# Patient Record
Sex: MALE | Race: OTHER | NOT HISPANIC OR LATINO
[De-identification: names, ages, dates, MRNs, and addresses within clinical notes are randomized per-mention and may not be internally consistent; named-entity substitution may affect disease eponyms.]

---

## 2017-11-29 ENCOUNTER — APPOINTMENT (OUTPATIENT)
Dept: INTERNAL MEDICINE | Facility: CLINIC | Age: 52
End: 2017-11-29

## 2019-10-21 ENCOUNTER — INPATIENT (INPATIENT)
Facility: HOSPITAL | Age: 54
LOS: 10 days | Discharge: HOME | DRG: 21 | End: 2019-11-01
Attending: NEUROLOGICAL SURGERY | Admitting: NEUROLOGICAL SURGERY
Payer: COMMERCIAL

## 2019-10-21 VITALS
HEART RATE: 68 BPM | HEIGHT: 69 IN | TEMPERATURE: 99 F | SYSTOLIC BLOOD PRESSURE: 168 MMHG | WEIGHT: 169.98 LBS | RESPIRATION RATE: 19 BRPM | DIASTOLIC BLOOD PRESSURE: 90 MMHG | OXYGEN SATURATION: 98 %

## 2019-10-21 DIAGNOSIS — I60.9 NONTRAUMATIC SUBARACHNOID HEMORRHAGE, UNSPECIFIED: ICD-10-CM

## 2019-10-21 LAB
ALBUMIN SERPL ELPH-MCNC: 4.6 G/DL — SIGNIFICANT CHANGE UP (ref 3.3–5)
ALP SERPL-CCNC: 63 U/L — SIGNIFICANT CHANGE UP (ref 40–120)
ALT FLD-CCNC: 29 U/L — SIGNIFICANT CHANGE UP (ref 10–45)
ANION GAP SERPL CALC-SCNC: 12 MMOL/L — SIGNIFICANT CHANGE UP (ref 5–17)
ANION GAP SERPL CALC-SCNC: 13 MMOL/L — SIGNIFICANT CHANGE UP (ref 5–17)
APTT BLD: 21.4 SEC — LOW (ref 27.5–36.3)
AST SERPL-CCNC: 14 U/L — SIGNIFICANT CHANGE UP (ref 10–40)
BASOPHILS # BLD AUTO: 0.02 K/UL — SIGNIFICANT CHANGE UP (ref 0–0.2)
BASOPHILS NFR BLD AUTO: 0.2 % — SIGNIFICANT CHANGE UP (ref 0–2)
BILIRUB SERPL-MCNC: 0.8 MG/DL — SIGNIFICANT CHANGE UP (ref 0.2–1.2)
BLD GP AB SCN SERPL QL: NEGATIVE — SIGNIFICANT CHANGE UP
BUN SERPL-MCNC: 15 MG/DL — SIGNIFICANT CHANGE UP (ref 7–23)
BUN SERPL-MCNC: 17 MG/DL — SIGNIFICANT CHANGE UP (ref 7–23)
CALCIUM SERPL-MCNC: 8.6 MG/DL — SIGNIFICANT CHANGE UP (ref 8.4–10.5)
CALCIUM SERPL-MCNC: 9.5 MG/DL — SIGNIFICANT CHANGE UP (ref 8.4–10.5)
CHLORIDE SERPL-SCNC: 103 MMOL/L — SIGNIFICANT CHANGE UP (ref 96–108)
CHLORIDE SERPL-SCNC: 105 MMOL/L — SIGNIFICANT CHANGE UP (ref 96–108)
CO2 SERPL-SCNC: 22 MMOL/L — SIGNIFICANT CHANGE UP (ref 22–31)
CO2 SERPL-SCNC: 25 MMOL/L — SIGNIFICANT CHANGE UP (ref 22–31)
CREAT SERPL-MCNC: 0.76 MG/DL — SIGNIFICANT CHANGE UP (ref 0.5–1.3)
CREAT SERPL-MCNC: 0.89 MG/DL — SIGNIFICANT CHANGE UP (ref 0.5–1.3)
EOSINOPHIL # BLD AUTO: 0.03 K/UL — SIGNIFICANT CHANGE UP (ref 0–0.5)
EOSINOPHIL NFR BLD AUTO: 0.3 % — SIGNIFICANT CHANGE UP (ref 0–6)
GLUCOSE SERPL-MCNC: 120 MG/DL — HIGH (ref 70–99)
GLUCOSE SERPL-MCNC: 94 MG/DL — SIGNIFICANT CHANGE UP (ref 70–99)
HCT VFR BLD CALC: 44.3 % — SIGNIFICANT CHANGE UP (ref 39–50)
HCT VFR BLD CALC: 47.6 % — SIGNIFICANT CHANGE UP (ref 39–50)
HGB BLD-MCNC: 15.6 G/DL — SIGNIFICANT CHANGE UP (ref 13–17)
HGB BLD-MCNC: 16.1 G/DL — SIGNIFICANT CHANGE UP (ref 13–17)
IMM GRANULOCYTES NFR BLD AUTO: 0.3 % — SIGNIFICANT CHANGE UP (ref 0–1.5)
INR BLD: 1.16 RATIO — SIGNIFICANT CHANGE UP (ref 0.88–1.16)
LYMPHOCYTES # BLD AUTO: 1.56 K/UL — SIGNIFICANT CHANGE UP (ref 1–3.3)
LYMPHOCYTES # BLD AUTO: 13.7 % — SIGNIFICANT CHANGE UP (ref 13–44)
MAGNESIUM SERPL-MCNC: 2.1 MG/DL — SIGNIFICANT CHANGE UP (ref 1.6–2.6)
MCHC RBC-ENTMCNC: 29.3 PG — SIGNIFICANT CHANGE UP (ref 27–34)
MCHC RBC-ENTMCNC: 30.5 PG — SIGNIFICANT CHANGE UP (ref 27–34)
MCHC RBC-ENTMCNC: 33.8 GM/DL — SIGNIFICANT CHANGE UP (ref 32–36)
MCHC RBC-ENTMCNC: 35.2 GM/DL — SIGNIFICANT CHANGE UP (ref 32–36)
MCV RBC AUTO: 86.5 FL — SIGNIFICANT CHANGE UP (ref 80–100)
MCV RBC AUTO: 86.7 FL — SIGNIFICANT CHANGE UP (ref 80–100)
MONOCYTES # BLD AUTO: 0.75 K/UL — SIGNIFICANT CHANGE UP (ref 0–0.9)
MONOCYTES NFR BLD AUTO: 6.6 % — SIGNIFICANT CHANGE UP (ref 2–14)
NEUTROPHILS # BLD AUTO: 8.99 K/UL — HIGH (ref 1.8–7.4)
NEUTROPHILS NFR BLD AUTO: 78.9 % — HIGH (ref 43–77)
NRBC # BLD: 0 /100 WBCS — SIGNIFICANT CHANGE UP (ref 0–0)
NRBC # BLD: 0 /100 WBCS — SIGNIFICANT CHANGE UP (ref 0–0)
PHOSPHATE SERPL-MCNC: 2.8 MG/DL — SIGNIFICANT CHANGE UP (ref 2.5–4.5)
PLATELET # BLD AUTO: 165 K/UL — SIGNIFICANT CHANGE UP (ref 150–400)
PLATELET # BLD AUTO: 198 K/UL — SIGNIFICANT CHANGE UP (ref 150–400)
POTASSIUM SERPL-MCNC: 3.8 MMOL/L — SIGNIFICANT CHANGE UP (ref 3.5–5.3)
POTASSIUM SERPL-MCNC: 3.9 MMOL/L — SIGNIFICANT CHANGE UP (ref 3.5–5.3)
POTASSIUM SERPL-SCNC: 3.8 MMOL/L — SIGNIFICANT CHANGE UP (ref 3.5–5.3)
POTASSIUM SERPL-SCNC: 3.9 MMOL/L — SIGNIFICANT CHANGE UP (ref 3.5–5.3)
PROT SERPL-MCNC: 7.8 G/DL — SIGNIFICANT CHANGE UP (ref 6–8.3)
PROTHROM AB SERPL-ACNC: 13.3 SEC — HIGH (ref 10–12.9)
RBC # BLD: 5.11 M/UL — SIGNIFICANT CHANGE UP (ref 4.2–5.8)
RBC # BLD: 5.5 M/UL — SIGNIFICANT CHANGE UP (ref 4.2–5.8)
RBC # FLD: 12.7 % — SIGNIFICANT CHANGE UP (ref 10.3–14.5)
RBC # FLD: 12.7 % — SIGNIFICANT CHANGE UP (ref 10.3–14.5)
RH IG SCN BLD-IMP: POSITIVE — SIGNIFICANT CHANGE UP
RH IG SCN BLD-IMP: POSITIVE — SIGNIFICANT CHANGE UP
SODIUM SERPL-SCNC: 140 MMOL/L — SIGNIFICANT CHANGE UP (ref 135–145)
SODIUM SERPL-SCNC: 140 MMOL/L — SIGNIFICANT CHANGE UP (ref 135–145)
WBC # BLD: 10.48 K/UL — SIGNIFICANT CHANGE UP (ref 3.8–10.5)
WBC # BLD: 11.38 K/UL — HIGH (ref 3.8–10.5)
WBC # FLD AUTO: 10.48 K/UL — SIGNIFICANT CHANGE UP (ref 3.8–10.5)
WBC # FLD AUTO: 11.38 K/UL — HIGH (ref 3.8–10.5)

## 2019-10-21 PROCEDURE — 99291 CRITICAL CARE FIRST HOUR: CPT

## 2019-10-21 RX ORDER — DEXAMETHASONE 0.5 MG/5ML
4 ELIXIR ORAL ONCE
Refills: 0 | Status: COMPLETED | OUTPATIENT
Start: 2019-10-21 | End: 2019-10-21

## 2019-10-21 RX ORDER — ONDANSETRON 8 MG/1
4 TABLET, FILM COATED ORAL ONCE
Refills: 0 | Status: DISCONTINUED | OUTPATIENT
Start: 2019-10-21 | End: 2019-10-21

## 2019-10-21 RX ORDER — METOCLOPRAMIDE HCL 10 MG
10 TABLET ORAL ONCE
Refills: 0 | Status: COMPLETED | OUTPATIENT
Start: 2019-10-21 | End: 2019-10-21

## 2019-10-21 RX ORDER — ACETAMINOPHEN 500 MG
650 TABLET ORAL EVERY 6 HOURS
Refills: 0 | Status: DISCONTINUED | OUTPATIENT
Start: 2019-10-21 | End: 2019-10-22

## 2019-10-21 RX ORDER — LEVETIRACETAM 250 MG/1
500 TABLET, FILM COATED ORAL
Refills: 0 | Status: DISCONTINUED | OUTPATIENT
Start: 2019-10-21 | End: 2019-10-22

## 2019-10-21 RX ORDER — ACETAMINOPHEN 500 MG
975 TABLET ORAL ONCE
Refills: 0 | Status: COMPLETED | OUTPATIENT
Start: 2019-10-21 | End: 2019-10-21

## 2019-10-21 RX ORDER — NIMODIPINE 60 MG/10ML
60 SOLUTION ORAL EVERY 4 HOURS
Refills: 0 | Status: DISCONTINUED | OUTPATIENT
Start: 2019-10-21 | End: 2019-10-22

## 2019-10-21 RX ORDER — CHLORHEXIDINE GLUCONATE 213 G/1000ML
1 SOLUTION TOPICAL
Refills: 0 | Status: DISCONTINUED | OUTPATIENT
Start: 2019-10-21 | End: 2019-10-25

## 2019-10-21 RX ADMIN — Medication 975 MILLIGRAM(S): at 15:18

## 2019-10-21 RX ADMIN — Medication 975 MILLIGRAM(S): at 20:15

## 2019-10-21 RX ADMIN — NIMODIPINE 60 MILLIGRAM(S): 60 SOLUTION ORAL at 18:39

## 2019-10-21 RX ADMIN — Medication 10 MILLIGRAM(S): at 14:10

## 2019-10-21 RX ADMIN — NIMODIPINE 60 MILLIGRAM(S): 60 SOLUTION ORAL at 21:26

## 2019-10-21 RX ADMIN — CHLORHEXIDINE GLUCONATE 1 APPLICATION(S): 213 SOLUTION TOPICAL at 21:26

## 2019-10-21 RX ADMIN — Medication 975 MILLIGRAM(S): at 19:45

## 2019-10-21 RX ADMIN — LEVETIRACETAM 500 MILLIGRAM(S): 250 TABLET, FILM COATED ORAL at 18:39

## 2019-10-21 RX ADMIN — Medication 4 MILLIGRAM(S): at 20:03

## 2019-10-21 RX ADMIN — Medication 975 MILLIGRAM(S): at 14:10

## 2019-10-21 NOTE — ED PROVIDER NOTE - ATTENDING CONTRIBUTION TO CARE
Attending [employer].  Agree with above.  Pt is a 55 yo male with no known pmhx who has not seen an MD in decades and currently presents with complaint of HA and dizziness.  Sxs started Mon afternoon with migrating HA and neck pain.  Friday was working outside ~2200 when he noted return of headache to superior head.  Pain inc overnight.  Pt couldn't sleep.  Pain was worst he's had from headache.  Endorses assoc nausea but no vomiting.  Dizziness positional and worsens with bending over/standing quickly.  Denies neck pain, light-sensitivity, CP, SOB, abd pain, diarrhea, rash.  Pt endorsed some chills at home without fever.  No recent trauma (head inj 2 yrs ago).

## 2019-10-21 NOTE — ED PROVIDER NOTE - CLINICAL SUMMARY MEDICAL DECISION MAKING FREE TEXT BOX
54M with no known PMH but has not seen a physician in decades presents with HA and dizziness. HA is the worst he can remember. No neck stifness or focal neuro deficit. DDx includes migraine, tension HA, viral syndrome, hemorrhage. Will get CTH to rule down bleed given worst HA with nausea. Will get cbc, cmp, coags. will attempt symptom control with reglan and tylenol.

## 2019-10-21 NOTE — ED ADULT NURSE NOTE - NSIMPLEMENTINTERV_GEN_ALL_ED
Implemented All Universal Safety Interventions:  [place] to call system. Call bell, personal items and telephone within reach. Instruct patient to call for assistance. Room bathroom lighting operational. Non-slip footwear when patient is off stretcher. Physically safe environment: no spills, clutter or unnecessary equipment. Stretcher in lowest position, wheels locked, appropriate side rails in place.

## 2019-10-21 NOTE — ED ADULT NURSE REASSESSMENT NOTE - NS ED NURSE REASSESS COMMENT FT1
Hourly estrella check performed and documented on paper neuro flow sheet located in pt chart. Pt resting in bed with family at beside, but complaining of unrelieved HA. MD aware. Will continue to reassess neuro and pain.

## 2019-10-21 NOTE — PROGRESS NOTE ADULT - ASSESSMENT
Summary: 55 yo male HH1 mF1 SAH    NEURO:  q1h neuro checks; plan for angio and possible embo in AM of a.comm aneurysm    CARDS:  -140    PULM:  sat > 92%    RENAL:  IVL    GASTRO:  NPO after midnight  ---> Stress ulcer prophylaxis:  not indicated    HEME:  monitor H/H    ---> DVT prophylaxis: SCDs, hold anticoagulation, fresh bleed    ENDO:  euglycemia    ID:  afebrile    Code status:  Full code  Disposition:  ICU    This patient was at high risk of neurologic deterioration and/or death due to: aneurysm re-bleed

## 2019-10-21 NOTE — ED ADULT TRIAGE NOTE - CHIEF COMPLAINT QUOTE
headache gradually worsening, intermittent in nature, nausea, dizzy, blurred vision x since last week, worsened x 3 days ago, no anticoagulants, +nontraumatic, vision change now improved, taking tylenol/aleve for pain without relief, steady gait

## 2019-10-21 NOTE — ED PROVIDER NOTE - OBJECTIVE STATEMENT
54M with no known PMH but has not seen a physician in decades presents with HA and dizziness. Last Mon afternoon started with migrating HA and neck pain. Friday was doing some chores outside and around 10pm noted return of headache, mostly on the top of the head. The pain kept increasing overnight. Couldn't sleep. Pain was the worst he could recall in recent memory. Endorses nausea but no vomiting. Dizziness is positional and worse when he bends over or stands up quickly. Denies neck pain, chest pain, light sensitivity, chest pain, sob, abd pain, diarrhea, rash. Pt endorsed some chills at home but no fever. Denies recent trauma. Did fall and hit his head two years ago, and didn't seek treatment.    Of note the patient has vitilago that he has had treatment for before but not now.    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

## 2019-10-21 NOTE — PROGRESS NOTE ADULT - SUBJECTIVE AND OBJECTIVE BOX
HPI:  54M with no known PMH but has not seen a physician in decades presents with HA and dizziness. Last Mon afternoon started with migrating HA and neck pain. Friday was doing some chores outside and around 10pm noted return of headache, mostly on the top of the head. The pain kept increasing overnight. Couldn't sleep. Pain was the worst he could recall in recent memory. Endorses nausea but no vomiting. Dizziness is positional and worse when he bends over or stands up quickly. Denies neck pain, chest pain, light sensitivity, chest pain, sob, abd pain, diarrhea, rash. Pt endorsed some chills at home but no fever. Denies recent trauma. Did fall and hit his head two years ago, and didn't seek treatment. (21 Oct 2019 17:54)    On admission, the patient was:  GCS: 15  Hunt-Garcia: 1  modified Garcia: 1  ICH score:  NIHSS:    VITALS:  T(C): , Max: 37.4 (10-21-19 @ 15:50)  HR:  (59 - 80)  BP:  (135/81 - 168/90)  ABP: --  RR:  (15 - 19)  SpO2:  (98% - 99%)  Wt(kg): --      LABS:  Na: 140 (10-21 @ 14:23)  K: 3.8 (10-21 @ 14:23)  Cl: 103 (10-21 @ 14:23)  CO2: 25 (10-21 @ 14:23)  BUN: 17 (10-21 @ 14:23)  Cr: 0.89 (10-21 @ 14:23)  Glu: 120(10-21 @ 14:23)    Hgb: 16.1 (10-21 @ 14:22)  Hct: 47.6 (10-21 @ 14:22)  WBC: 11.38 (10-21 @ 14:22)  Plt: 198 (10-21 @ 14:22)  PT: 13.3 (10-21 @ 14:23)  INR: 1.16 (10-21 @ 14:23)  aPTT: 21.4 (10-21 @ 14:23)    IMAGING:   Recent imaging studies were reviewed.    MEDICATIONS:  acetaminophen   Tablet .. 650 milliGRAM(s) Oral every 6 hours PRN  chlorhexidine 4% Liquid 1 Application(s) Topical <User Schedule>  levETIRAcetam 500 milliGRAM(s) Oral two times a day  niMODipine 60 milliGRAM(s) Oral every 4 hours    EXAMINATION:  General:  calm  HEENT:  MMM  Neuro:  awake, alert, oriented x 3, follows commands, moves all extremities  Cards:  RRR  Respiratory:  no respiratory distress  Adomen:  soft  Extremities:  no edema  Skin:  warm/dry

## 2019-10-21 NOTE — ED PROVIDER NOTE - CRITICAL CARE ATTESTATION
No
I have personally provided the amount of critical care time documented below concurrently with the resident/fellow.  This time excludes time spent on separate procedures and time spent teaching. I have reviewed the resident’s/fellow’s documentation and I agree with the assessment and plan of care

## 2019-10-21 NOTE — H&P ADULT - HISTORY OF PRESENT ILLNESS
54M with no known PMH but has not seen a physician in decades presents with HA and dizziness. Last Mon afternoon started with migrating HA and neck pain. Friday was doing some chores outside and around 10pm noted return of headache, mostly on the top of the head. The pain kept increasing overnight. Couldn't sleep. Pain was the worst he could recall in recent memory. Endorses nausea but no vomiting. Dizziness is positional and worse when he bends over or stands up quickly. Denies neck pain, chest pain, light sensitivity, chest pain, sob, abd pain, diarrhea, rash. Pt endorsed some chills at home but no fever. Denies recent trauma. Did fall and hit his head two years ago, and didn't seek treatment.

## 2019-10-21 NOTE — ED ADULT NURSE NOTE - OBJECTIVE STATEMENT
55 y/o male presenting to ED from home c/o gradual onset HA and dizziness that started 1 week ago. No known PMH, has not seen doctor in about 30 years. Pt states HA has gotten worse and is 10/10 pulsating pain on top of head that migrates to face and sides of head and prevents him from sleeping at night. Tylenol and Alieve at home has not relieved pain. States dizziness is worse when bending over or standing up quickly. Pt has persistent nausea but denies vomiting. A&Ox4, breath sounds clear bilaterally, no abd distention or tenderness, able to ambulate and move all extremities, no edema noted. Denies neck or chest pain, SOB, falls, trauma, diarrhea, fever, cough. Per wife, pt had fall 2 years ago for which he did not seek tx. IV placed and labs drawn. MD at bedside to assess. Will continue to reassess.

## 2019-10-21 NOTE — ED PROVIDER NOTE - NS ED ROS FT
Gen: No fever, dec appetite  Eyes: No eye irritation or discharge  ENT: No ear pain, congestion, sore throat  Resp: No cough or trouble breathing  Cardiovascular: No chest pain or palpitation  Gastroenteric: No vomiting, diarrhea, constipation  :  No change in urine output; no dysuria  MS: No joint or muscle pain  Skin: No rashes  Neuro: No abnormal movements  Remainder negative, except as per the HPI

## 2019-10-21 NOTE — ED PROVIDER NOTE - PHYSICAL EXAMINATION
General: WN/WD NAD  HEENT: PERRLA, EOMI, moist mucous membranes  Neurology: A&Ox3, nonfocal, DE LA VEGA x 4  Respiratory: CTA B/L, normal respiratory effort, no wheezes, crackles, rales  CV: RRR, S1S2, no murmurs, rubs or gallops  Abdominal: Soft, NT, ND +BS, Last BM  Extremities: No edema, + peripheral pulses  Skin: diffuse vitiligo BUE and circumferential around the neck  Tubes: none

## 2019-10-21 NOTE — H&P ADULT - ASSESSMENT
54M no known PMHx hasn’t seen MD in decades presents to ED with complaint of HA since last Monday. The HA worsened while working outside on Friday. Patient endorses N but no V. CT head shows focal R SAH, CTA shows 4.4mm R acomm aneurysm pointing anteriorly and to the left with 2.4mm neck as well as hypoplastic L A1 segment. Exam: Intact  - admit NSCU  - q1 checks  - BP <140  - CIWA

## 2019-10-21 NOTE — ED ADULT NURSE REASSESSMENT NOTE - NS ED NURSE REASSESS COMMENT FT1
Pt returned from CT and says his headache pain has slight relief. Neurosurgery at bedside for consult.

## 2019-10-21 NOTE — ED PROVIDER NOTE - CARE PLAN
Principal Discharge DX:	Headache  Secondary Diagnosis:	Nausea Principal Discharge DX:	Subarachnoid bleed  Secondary Diagnosis:	Nausea  Secondary Diagnosis:	Acute intractable headache, unspecified headache type

## 2019-10-21 NOTE — ED PROVIDER NOTE - PROGRESS NOTE DETAILS
Attending MD Lopez.  Appreciate call by Dr. Rivera (radiology) endorsing PCOM aneurysm and small SAH on CTA.  Pt and wife updated.  Pain well controlled, head of bed at 45 degrees.   systolic currently without intervention.  NSG made aware by CANDIE Colbert in department within 5 min.  Confirmed again, pt takes no blood thinners, no ASA. Franky CARTER: Accepted to Dr. Jiménez to the [facility]

## 2019-10-21 NOTE — H&P ADULT - NSHPPHYSICALEXAM_GEN_ALL_CORE
AOx3, FC, PERRL, EOMI, no facial   5/5 throughout, no drift  SILT  no clonus  minimal MORAES  denies nausea

## 2019-10-22 ENCOUNTER — TRANSCRIPTION ENCOUNTER (OUTPATIENT)
Age: 54
End: 2019-10-22

## 2019-10-22 ENCOUNTER — APPOINTMENT (OUTPATIENT)
Dept: NEUROSURGERY | Facility: HOSPITAL | Age: 54
End: 2019-10-22
Payer: COMMERCIAL

## 2019-10-22 LAB
ANION GAP SERPL CALC-SCNC: 10 MMOL/L — SIGNIFICANT CHANGE UP (ref 5–17)
BUN SERPL-MCNC: 14 MG/DL — SIGNIFICANT CHANGE UP (ref 7–23)
CALCIUM SERPL-MCNC: 8 MG/DL — LOW (ref 8.4–10.5)
CHLORIDE SERPL-SCNC: 107 MMOL/L — SIGNIFICANT CHANGE UP (ref 96–108)
CHOLEST SERPL-MCNC: 151 MG/DL — SIGNIFICANT CHANGE UP (ref 10–199)
CO2 SERPL-SCNC: 18 MMOL/L — LOW (ref 22–31)
CREAT SERPL-MCNC: 0.74 MG/DL — SIGNIFICANT CHANGE UP (ref 0.5–1.3)
GLUCOSE SERPL-MCNC: 169 MG/DL — HIGH (ref 70–99)
HBA1C BLD-MCNC: 5.7 % — HIGH (ref 4–5.6)
HCT VFR BLD CALC: 41.4 % — SIGNIFICANT CHANGE UP (ref 39–50)
HDLC SERPL-MCNC: 42 MG/DL — SIGNIFICANT CHANGE UP
HGB BLD-MCNC: 14.9 G/DL — SIGNIFICANT CHANGE UP (ref 13–17)
LIPID PNL WITH DIRECT LDL SERPL: 93 MG/DL — SIGNIFICANT CHANGE UP
MAGNESIUM SERPL-MCNC: 2 MG/DL — SIGNIFICANT CHANGE UP (ref 1.6–2.6)
MCHC RBC-ENTMCNC: 30.5 PG — SIGNIFICANT CHANGE UP (ref 27–34)
MCHC RBC-ENTMCNC: 36 GM/DL — SIGNIFICANT CHANGE UP (ref 32–36)
MCV RBC AUTO: 84.8 FL — SIGNIFICANT CHANGE UP (ref 80–100)
NRBC # BLD: 0 /100 WBCS — SIGNIFICANT CHANGE UP (ref 0–0)
PA ADP PRP-ACNC: 183 PRU — LOW (ref 194–417)
PHOSPHATE SERPL-MCNC: 2 MG/DL — LOW (ref 2.5–4.5)
PLATELET # BLD AUTO: 177 K/UL — SIGNIFICANT CHANGE UP (ref 150–400)
POTASSIUM SERPL-MCNC: 4 MMOL/L — SIGNIFICANT CHANGE UP (ref 3.5–5.3)
POTASSIUM SERPL-SCNC: 4 MMOL/L — SIGNIFICANT CHANGE UP (ref 3.5–5.3)
RBC # BLD: 4.88 M/UL — SIGNIFICANT CHANGE UP (ref 4.2–5.8)
RBC # FLD: 12.5 % — SIGNIFICANT CHANGE UP (ref 10.3–14.5)
SODIUM SERPL-SCNC: 135 MMOL/L — SIGNIFICANT CHANGE UP (ref 135–145)
TOTAL CHOLESTEROL/HDL RATIO MEASUREMENT: 3.6 RATIO — SIGNIFICANT CHANGE UP (ref 3.4–9.6)
TRIGL SERPL-MCNC: 81 MG/DL — SIGNIFICANT CHANGE UP (ref 10–149)
WBC # BLD: 13.6 K/UL — HIGH (ref 3.8–10.5)
WBC # FLD AUTO: 13.6 K/UL — HIGH (ref 3.8–10.5)

## 2019-10-22 PROCEDURE — 36228 PLACE CATH INTRACRANIAL ART: CPT

## 2019-10-22 PROCEDURE — 99291 CRITICAL CARE FIRST HOUR: CPT

## 2019-10-22 PROCEDURE — 36224 PLACE CATH CAROTD ART: CPT | Mod: 50

## 2019-10-22 PROCEDURE — 70496 CT ANGIOGRAPHY HEAD: CPT | Mod: 26

## 2019-10-22 PROCEDURE — 75894 X-RAYS TRANSCATH THERAPY: CPT | Mod: 26

## 2019-10-22 PROCEDURE — 36227 PLACE CATH XTRNL CAROTID: CPT | Mod: 50

## 2019-10-22 PROCEDURE — 36226 PLACE CATH VERTEBRAL ART: CPT | Mod: 50

## 2019-10-22 PROCEDURE — 61624 TCAT PERM OCCLS/EMBOLJ CNS: CPT

## 2019-10-22 PROCEDURE — 75898 FOLLOW-UP ANGIOGRAPHY: CPT | Mod: 26

## 2019-10-22 PROCEDURE — 36620 INSERTION CATHETER ARTERY: CPT

## 2019-10-22 PROCEDURE — 93306 TTE W/DOPPLER COMPLETE: CPT | Mod: 26

## 2019-10-22 PROCEDURE — 70450 CT HEAD/BRAIN W/O DYE: CPT | Mod: 26

## 2019-10-22 RX ORDER — SODIUM CHLORIDE 9 MG/ML
1000 INJECTION INTRAMUSCULAR; INTRAVENOUS; SUBCUTANEOUS
Refills: 0 | Status: DISCONTINUED | OUTPATIENT
Start: 2019-10-22 | End: 2019-10-23

## 2019-10-22 RX ORDER — NIMODIPINE 60 MG/10ML
30 SOLUTION ORAL EVERY 4 HOURS
Refills: 0 | Status: DISCONTINUED | OUTPATIENT
Start: 2019-10-22 | End: 2019-10-22

## 2019-10-22 RX ORDER — NIMODIPINE 60 MG/10ML
30 SOLUTION ORAL
Refills: 0 | Status: DISCONTINUED | OUTPATIENT
Start: 2019-10-22 | End: 2019-10-31

## 2019-10-22 RX ORDER — ACETAMINOPHEN 500 MG
975 TABLET ORAL EVERY 6 HOURS
Refills: 0 | Status: DISCONTINUED | OUTPATIENT
Start: 2019-10-22 | End: 2019-10-22

## 2019-10-22 RX ORDER — FAMOTIDINE 10 MG/ML
20 INJECTION INTRAVENOUS
Refills: 0 | Status: DISCONTINUED | OUTPATIENT
Start: 2019-10-22 | End: 2019-10-25

## 2019-10-22 RX ORDER — POLYETHYLENE GLYCOL 3350 17 G/17G
17 POWDER, FOR SOLUTION ORAL DAILY
Refills: 0 | Status: DISCONTINUED | OUTPATIENT
Start: 2019-10-22 | End: 2019-11-01

## 2019-10-22 RX ORDER — ASPIRIN/CALCIUM CARB/MAGNESIUM 324 MG
325 TABLET ORAL
Refills: 0 | Status: DISCONTINUED | OUTPATIENT
Start: 2019-10-23 | End: 2019-11-01

## 2019-10-22 RX ORDER — DEXAMETHASONE 0.5 MG/5ML
4 ELIXIR ORAL EVERY 6 HOURS
Refills: 0 | Status: COMPLETED | OUTPATIENT
Start: 2019-10-22 | End: 2019-10-25

## 2019-10-22 RX ORDER — INFLUENZA VIRUS VACCINE 15; 15; 15; 15 UG/.5ML; UG/.5ML; UG/.5ML; UG/.5ML
0.5 SUSPENSION INTRAMUSCULAR ONCE
Refills: 0 | Status: COMPLETED | OUTPATIENT
Start: 2019-10-22 | End: 2019-11-01

## 2019-10-22 RX ORDER — ACETAMINOPHEN 500 MG
975 TABLET ORAL EVERY 6 HOURS
Refills: 0 | Status: DISCONTINUED | OUTPATIENT
Start: 2019-10-22 | End: 2019-10-27

## 2019-10-22 RX ORDER — SENNA PLUS 8.6 MG/1
1 TABLET ORAL DAILY
Refills: 0 | Status: DISCONTINUED | OUTPATIENT
Start: 2019-10-22 | End: 2019-11-01

## 2019-10-22 RX ADMIN — NIMODIPINE 30 MILLIGRAM(S): 60 SOLUTION ORAL at 14:37

## 2019-10-22 RX ADMIN — LEVETIRACETAM 500 MILLIGRAM(S): 250 TABLET, FILM COATED ORAL at 05:13

## 2019-10-22 RX ADMIN — Medication 4 MILLIGRAM(S): at 18:12

## 2019-10-22 RX ADMIN — Medication 650 MILLIGRAM(S): at 06:00

## 2019-10-22 RX ADMIN — Medication 4 MILLIGRAM(S): at 13:17

## 2019-10-22 RX ADMIN — NIMODIPINE 30 MILLIGRAM(S): 60 SOLUTION ORAL at 22:08

## 2019-10-22 RX ADMIN — CHLORHEXIDINE GLUCONATE 1 APPLICATION(S): 213 SOLUTION TOPICAL at 21:18

## 2019-10-22 RX ADMIN — Medication 4 MILLIGRAM(S): at 23:46

## 2019-10-22 RX ADMIN — NIMODIPINE 30 MILLIGRAM(S): 60 SOLUTION ORAL at 18:13

## 2019-10-22 RX ADMIN — NIMODIPINE 30 MILLIGRAM(S): 60 SOLUTION ORAL at 16:25

## 2019-10-22 RX ADMIN — FAMOTIDINE 20 MILLIGRAM(S): 10 INJECTION INTRAVENOUS at 18:13

## 2019-10-22 RX ADMIN — Medication 4 MILLIGRAM(S): at 03:45

## 2019-10-22 RX ADMIN — NIMODIPINE 30 MILLIGRAM(S): 60 SOLUTION ORAL at 23:46

## 2019-10-22 RX ADMIN — Medication 975 MILLIGRAM(S): at 21:51

## 2019-10-22 RX ADMIN — Medication 650 MILLIGRAM(S): at 06:30

## 2019-10-22 RX ADMIN — Medication 975 MILLIGRAM(S): at 22:20

## 2019-10-22 NOTE — PROCEDURE NOTE - NSPROCDETAILS_GEN_ALL_CORE
hemostasis with direct pressure, dressing applied/Seldinger technique/location identified, draped/prepped, sterile technique used, needle inserted/introduced/connected to a pressurized flush line/all materials/supplies accounted for at end of procedure/positive blood return obtained via catheter/sutured in place

## 2019-10-22 NOTE — DIETITIAN INITIAL EVALUATION ADULT. - REASON INDICATOR FOR ASSESSMENT
Nutrition Assessment warranted for length of stay.  Information obtained from: RN, comprehensive chart review, interdisciplinary medical rounds, patient, spouse

## 2019-10-22 NOTE — PROGRESS NOTE ADULT - SUBJECTIVE AND OBJECTIVE BOX
NSCU ATTENDING -- ADDITIONAL PROGRESS NOTE    Nighttime rounds were performed -- please refer to earlier Progress Note for HPI details.    T(C): 37.3 (10-22-19 @ 19:00), Max: 37.3 (10-22-19 @ 19:00)  HR: 68 (10-22-19 @ 22:00) (55 - 100)  BP: 142/85 (10-22-19 @ 08:37) (103/73 - 142/85)  RR: 19 (10-22-19 @ 22:00) (12 - 27)  SpO2: 99% (10-22-19 @ 19:00) (95% - 100%)  Wt(kg): --    Relevant labwork and imaging reviewed.    A-line no longer working, removed.  Secured aneurysm, liberal BP parameter per today's note (to 180), hold off on 3rd a-line attempt for now.  Otherwise intact.

## 2019-10-22 NOTE — DIETITIAN INITIAL EVALUATION ADULT. - ETIOLOGY
predicted inadequate energy intake r/t headaches, altered GI function (nausea) PTA increased demand for nutrient in the setting of brain injury, neurosurgical intervention

## 2019-10-22 NOTE — DIETITIAN INITIAL EVALUATION ADULT. - ADD RECOMMEND
1) Recommend add no beef/no pork to current diet regimen. If persistent elevated FSBG, consider add consistent carbohydrate restriction. Defer consistency to medical team, SLP. 2) Encourage and monitor PO intake. Encourage use of daily menus to promote optimal PO intake potential. 3) Assess need for ? oral supplement. Pt refused at this time. 4) Monitor wt trends, nutrition related labs, skin integrity, hydration status and bowel regularity.

## 2019-10-22 NOTE — PROGRESS NOTE ADULT - SUBJECTIVE AND OBJECTIVE BOX
SUMMARY: 54M with no known PMH but has not seen a physician in decades presents with HA and dizziness. Last Mon afternoon started with migrating HA and neck pain. Friday was doing some chores outside and around 10pm noted return of headache, mostly on the top of the head. The pain kept increasing overnight. Couldn't sleep. Pain was the worst he could recall in recent memory. Endorses nausea but no vomiting. Dizziness is positional and worse when he bends over or stands up quickly. Denies neck pain, chest pain, light sensitivity, chest pain, sob, abd pain, diarrhea, rash. Pt endorsed some chills at home but no fever. Denies recent trauma. Did fall and hit his head two years ago, and didn't seek treatment. (21 Oct 2019 17:54)    On admission, the patient was:  GCS: 15  Hunt-Garcia: 1  modified Garcia: 1  ICH score:  NIHSS:    Overnight Events:     ROS: negative [] unable to obtain as patient is comatose/intubated/aphasic []     VITALS:   T(C): 36.8 (10-22-19 @ 03:00), Max: 37.4 (10-21-19 @ 15:50)  HR: 62 (10-22-19 @ 06:00) (55 - 80)  BP: 129/85 (10-22-19 @ 06:00) (103/73 - 168/90)  RR: 14 (10-22-19 @ 06:00) (12 - 19)  SpO2: 100% (10-22-19 @ 06:00) (94% - 100%)    10-21-19 @ 07:01  -  10-22-19 @ 07:00  --------------------------------------------------------  IN: 600 mL / OUT: 600 mL / NET: 0 mL      LABS:                          15.6   10.48 )-----------( 165      ( 21 Oct 2019 19:25 )             44.3     10-21    140  |  105  |  15  ----------------------------<  94  3.9   |  22  |  0.76    Ca    8.6      21 Oct 2019 19:25  Phos  2.8     10-21  Mg     2.1     10-21    TPro  7.8  /  Alb  4.6  /  TBili  0.8  /  DBili  x   /  AST  14  /  ALT  29  /  AlkPhos  63  10-21    PT/INR - ( 21 Oct 2019 14:23 )   PT: 13.3 sec;   INR: 1.16 ratio         PTT - ( 21 Oct 2019 14:23 )  PTT:21.4 sec  MEDS:  MEDICATIONS  (STANDING):  chlorhexidine 4% Liquid 1 Application(s) Topical <User Schedule>  dexamethasone  Injectable 4 milliGRAM(s) IV Push every 6 hours  influenza   Vaccine 0.5 milliLiter(s) IntraMuscular once  levETIRAcetam 500 milliGRAM(s) Oral two times a day  niMODipine 60 milliGRAM(s) Oral every 4 hours  sodium chloride 0.9%. 1000 milliLiter(s) (75 mL/Hr) IV Continuous <Continuous>    EXAMINATION:  General:  calm  HEENT:  MMM  Neuro:  awake, alert, oriented x 3, follows commands, moves all extremities  Cards:  RRR  Respiratory:  no respiratory distress  Adomen:  soft  Extremities:  no edema  Skin:  warm/dry SUMMARY: 54M with no known PMH but has not seen a physician in decades presents with HA and dizziness. Last Mon afternoon started with migrating HA and neck pain. Friday was doing some chores outside and around 10pm noted return of headache, mostly on the top of the head. The pain kept increasing overnight. Couldn't sleep. Pain was the worst he could recall in recent memory. Endorses nausea but no vomiting. Dizziness is positional and worse when he bends over or stands up quickly. Denies neck pain, chest pain, light sensitivity, chest pain, sob, abd pain, diarrhea, rash. Pt endorsed some chills at home but no fever. Denies recent trauma. Did fall and hit his head two years ago, and didn't seek treatment. (21 Oct 2019 17:54)    On admission, the patient was:  GCS: 15  Hunt-Garcia: 1  modified Garcia: 1    Overnight Events: Underwent angio - Pitts embolization of R AComm aneurysm.   Did not receive Nimodipine overnight 2/2 to bradycardia.    ROS: Anxious overnight. Otherwise negative.     VITALS:   T(C): 36.8 (10-22-19 @ 03:00), Max: 37.4 (10-21-19 @ 15:50)  HR: 62 (10-22-19 @ 06:00) (55 - 80)  BP: 129/85 (10-22-19 @ 06:00) (103/73 - 168/90)  RR: 14 (10-22-19 @ 06:00) (12 - 19)  SpO2: 100% (10-22-19 @ 06:00) (94% - 100%)    10-21-19 @ 07:01  -  10-22-19 @ 07:00  --------------------------------------------------------  IN: 600 mL / OUT: 600 mL / NET: 0 mL      LABS:                          15.6   10.48 )-----------( 165      ( 21 Oct 2019 19:25 )             44.3     10-21    140  |  105  |  15  ----------------------------<  94  3.9   |  22  |  0.76    Ca    8.6      21 Oct 2019 19:25  Phos  2.8     10-21  Mg     2.1     10-21    TPro  7.8  /  Alb  4.6  /  TBili  0.8  /  DBili  x   /  AST  14  /  ALT  29  /  AlkPhos  63  10-21    PT/INR - ( 21 Oct 2019 14:23 )   PT: 13.3 sec;   INR: 1.16 ratio         PTT - ( 21 Oct 2019 14:23 )  PTT:21.4 sec  MEDS:  MEDICATIONS  (STANDING):  chlorhexidine 4% Liquid 1 Application(s) Topical <User Schedule>  dexamethasone  Injectable 4 milliGRAM(s) IV Push every 6 hours  influenza   Vaccine 0.5 milliLiter(s) IntraMuscular once  levETIRAcetam 500 milliGRAM(s) Oral two times a day  niMODipine 60 milliGRAM(s) Oral every 4 hours  sodium chloride 0.9%. 1000 milliLiter(s) (75 mL/Hr) IV Continuous <Continuous>    EXAMINATION:  General:  calm  HEENT:  MMM  Neuro:  awake, alert, oriented x 3, follows commands, moves all extremities  Cards:  RRR  Respiratory:  no respiratory distress  Adomen:  soft  Extremities:  no edema  Skin:  warm/dry

## 2019-10-22 NOTE — PROGRESS NOTE ADULT - ASSESSMENT
Summary: 55 yo male HH1 mF1 SAH  PBD #1    NEURO: q1h neuro checks;   Angio this AM  Treatment: OR vs. IR for aneurysm treatment  Seizure Prophylaxis: Levetiracetam until aneurysm treated  Delayed Cerebral Ischemia Management: Serial screening TCDs, euvolemia, nimodipin  Pain: PRN analgesics  Activity: [] OOB as tolerated [X] Bedrest [] PT [] OT [] PMNR    PULM: SpO2 > 92%  Incentive spirometry    CV:  SBP goal 100 - 140  TTE    RENAL:  Fluids: IVF    GI:  Diet: NPO  GI prophylaxis [] not indicated [X] PPI [] other:  Bowel regimen [X] colace [X] senna [] other:    ENDO:   Goal euglycemia (-180)    HEME/ONC:  VTE prophylaxis: [] SCDs [] chemoprophylaxis [X] hold chemoprophylaxis due to: unsecured aneurysm    ID: Monitor for fevers    MISC:    SOCIAL/FAMILY:  [X] awaiting [] updated at bedside [] family meeting    CODE STATUS:  [X] Full Code [] DNR [] DNI [] Palliative/Comfort Care    DISPOSITION:  [X] ICU [] Stroke Unit [] Floor [] EMU [] RCU [] PCU    [X] Patient is at high risk of neurologic deterioration/death due to: Re-rupture, Delayed Cerebral Ischemia    Time seen:  Time spent: 35 critical care minutes  plan for angio and possible embo in AM of a.comm aneurysm    CARDS:  -140    PULM:  sat > 92%  Incentive spirometry, if able    RENAL:  IVF    GASTRO:  NPO after midnight  Bowel miki  ---> Stress ulcer prophylaxis:  not indicated    HEME:  monitor H/H    ---> DVT prophylaxis: SCDs, hold anticoagulation, fresh bleed    ENDO:  euglycemia    ID:  afebrile    Code status:  Full code  Disposition:  ICU    This patient was at high risk of neurologic deterioration and/or death due to: aneurysm re-bleed Summary: 55 yo male HH1 mF1 SAH 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)  PBD #1    NEURO: q1h neuro checks;   On Dexamethasone 4iv q6h for 3 days per NSGY  on  mg po daily  DC Keppra  Delayed Cerebral Ischemia Management: Serial screening TCDs, euvolemia,  Nimodipine 30 q2H (since patient is bradycardic)  Pain: PRN analgesics  Activity: [X] OOB as tolerated [] Bedrest [X] PT [X] OT [] PMNR    PULM: SpO2 > 92%  Incentive spirometry    CV:  SBP goal 100 - 140  TTE    RENAL:  Fluids: IVF    GI:  Diet: NPO  GI prophylaxis [] not indicated [X] PPI [] other:  Bowel regimen [X] colace [X] senna [] other:    ENDO:   Goal euglycemia (-180)    HEME/ONC:  VTE prophylaxis: [] SCDs [] chemoprophylaxis [X] hold chemoprophylaxis due to: unsecured aneurysm    ID: Monitor for fevers    MISC:    SOCIAL/FAMILY:  [X] awaiting [] updated at bedside [] family meeting    CODE STATUS:  [X] Full Code [] DNR [] DNI [] Palliative/Comfort Care    DISPOSITION:  [X] ICU [] Stroke Unit [] Floor [] EMU [] RCU [] PCU    [X] Patient is at high risk of neurologic deterioration/death due to: Re-rupture, Delayed Cerebral Ischemia    Time seen:  Time spent: 35 critical care minutes  plan for angio and possible embo in AM of a.comm aneurysm    CARDS:  -140    PULM:  sat > 92%  Incentive spirometry, if able    RENAL:  IVF    GASTRO:  NPO after midnight  Bowel miki  ---> Stress ulcer prophylaxis:  not indicated    HEME:  monitor H/H    ---> DVT prophylaxis: SCDs, hold anticoagulation, fresh bleed    ENDO:  euglycemia    ID:  afebrile    Code status:  Full code  Disposition:  ICU    This patient was at high risk of neurologic deterioration and/or death due to: aneurysm re-bleed Summary: 55 yo male HH1 mF1 SAH 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)  PBD #1    NEURO: q1h neuro checks;   On Dexamethasone 4iv q6h for 3 days per NSGY  on  mg po daily  DC Keppra  Delayed Cerebral Ischemia Management: Serial screening TCDs, euvolemia,  Nimodipine 30 q2H (since patient is bradycardic)  Pain: PRN analgesics  Activity: [X] OOB as tolerated [] Bedrest [X] PT [X] OT [] PMNR    PULM: SpO2 > 92%  Incentive spirometry    CV:  SBP goal 100 - 180  TTE    RENAL:  Fluids: IVL    GI:  Diet: Regular diet  GI prophylaxis - Famotidine 20 BID (while on steroids)  Bowel regimen [X] colace [X] senna [] other:    ENDO:   Goal euglycemia (-180)    HEME/ONC:  VTE prophylaxis: [X] SCDs [] chemoprophylaxis [X] hold chemoprophylaxis today; will start tomorrow    ID: Monitor for fevers    MISC:    SOCIAL/FAMILY:  [] awaiting [X] updated at bedside [] family meeting    CODE STATUS:  [X] Full Code [] DNR [] DNI [] Palliative/Comfort Care    DISPOSITION:  [X] ICU [] Stroke Unit [] Floor [] EMU [] RCU [] PCU    [X] Patient is at high risk of neurologic deterioration/death due to: Re-rupture, Delayed Cerebral Ischemia    Time seen:  Time spent: 35 critical care minutes  plan for angio and possible embo in AM of a.comm aneurysm    CARDS:  -140    PULM:  sat > 92%  Incentive spirometry, if able    RENAL:  IVF    GASTRO:  NPO after midnight  Bowel miki  ---> Stress ulcer prophylaxis:  not indicated    HEME:  monitor H/H    ---> DVT prophylaxis: SCDs, hold anticoagulation, fresh bleed    ENDO:  euglycemia    ID:  afebrile    Code status:  Full code  Disposition:  ICU    This patient was at high risk of neurologic deterioration and/or death due to: aneurysm re-bleed Summary: 55 yo male HH1 mF1 SAH 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)  PBD #1    NEURO: q1h neuro checks;   On Dexamethasone 4iv q6h for 3 days per NSGY  on  mg po daily  DC Keppra  Delayed Cerebral Ischemia Management: Serial screening TCDs, euvolemia,  Nimodipine 30 q2H (since patient is bradycardic)  Pain: PRN analgesics  Activity: [X] OOB as tolerated [] Bedrest [X] PT [X] OT [] PMNR    PULM: SpO2 > 92%  Incentive spirometry    CV:  SBP goal 100 - 180  TTE    RENAL:  Fluids: IVL    GI:  Diet: Regular diet  GI prophylaxis - Famotidine 20 BID (while on steroids)  Bowel regimen [X] colace [X] senna [] other:    ENDO:   Goal euglycemia (-180)    HEME/ONC:  VTE prophylaxis: [X] SCDs [] chemoprophylaxis [X] hold chemoprophylaxis today; will start tomorrow    ID: Monitor for fevers    MISC:    SOCIAL/FAMILY:  [] awaiting [X] updated at bedside [] family meeting    CODE STATUS:  [X] Full Code [] DNR [] DNI [] Palliative/Comfort Care    DISPOSITION:  [X] ICU [] Stroke Unit [] Floor [] EMU [] RCU [] PCU    [X] Patient is at high risk of neurologic deterioration/death due to: Re-rupture, Delayed Cerebral Ischemia    Time seen:  Time spent: 35 critical care minutes Summary: 53 yo male HH1 mF1 SAH 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)  PBD #1    NEURO: q1h neuro checks;   On Dexamethasone 4iv q6h for 3 days per NSGY  on  mg po daily  DC Keppra  Delayed Cerebral Ischemia Management: Serial screening TCDs, euvolemia,  Nimodipine 30 q2H (since patient is bradycardic)  Pain: PRN analgesics  Activity: [X] OOB as tolerated [] Bedrest [X] PT [X] OT [] PMNR    PULM: SpO2 > 92%  Incentive spirometry    CV:  SBP goal 100 - 180  TTE    RENAL:  Fluids: IVL    GI:  Diet: Regular diet  GI prophylaxis - Famotidine 20 BID (while on steroids)  Bowel regimen [X] colace [X] senna [] other:    ENDO:   Goal euglycemia (-180)    HEME/ONC:  VTE prophylaxis: [X] SCDs [] chemoprophylaxis [X] hold chemoprophylaxis today; will start tomorrow    ID: Monitor for fevers    MISC:    SOCIAL/FAMILY:  [] awaiting [X] updated at bedside [] family meeting    CODE STATUS:  [X] Full Code [] DNR [] DNI [] Palliative/Comfort Care    DISPOSITION:  [X] ICU [] Stroke Unit [] Floor [] EMU [] RCU [] PCU    [X] Patient is at high risk of neurologic deterioration/death due to: Re-rupture, Delayed Cerebral Ischemia

## 2019-10-22 NOTE — CHART NOTE - NSCHARTNOTEFT_GEN_A_CORE
Upon Nutritional Assessment by the Registered Dietitian your patient was determined to meet criteria / has evidence of the following diagnosis/diagnoses:          [ ]  Mild Protein Calorie Malnutrition        [x]  Moderate Protein Calorie Malnutrition        [ ] Severe Protein Calorie Malnutrition        [ ] Unspecified Protein Calorie Malnutrition        [ ] Underweight / BMI <19        [ ] Morbid Obesity / BMI > 40      Findings as based on:  [x] Comprehensive nutrition assessment : PO intake </=75% x 7 days; wt loss of 4.2% x 1 week.   [ ] Nutrition Focused Physical Exam :   [ ] Other:       Nutrition Plan/Recommendations:    1) Recommend add no beef/no pork to current diet regimen. If persistently elevated FSBG, consider adding consistent carbohydrate restriction. Defer consistency to medical team, SLP.   2) Encourage and monitor PO intake. Encourage use of daily menus to promote optimal PO intake potential.   3) Assess need for ? oral supplement. Pt refused at this time.   4) Monitor wt trends, nutrition related labs, skin integrity, hydration status and bowel regularity.      PROVIDER Section:     By signing this assessment you are acknowledging and agree with the diagnosis/diagnoses assigned by the Registered Dietitian    Comments:

## 2019-10-22 NOTE — DIETITIAN INITIAL EVALUATION ADULT. - OTHER INFO
Pt is a 53 yo M with no known PMH (has not seen physician in decades). Presented with HA and dizziness, initiating last Monday 10/14. 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     Per discussion with pt, reports good appetite at baseline. Consumed on average 3 meals daily. Reports decline in overall intake since initiation of headaches (x ~1 week), was consuming </=75% of meals 2/2 headaches and nausea. Dietary preferences obtained for no beef/no pork; enjoys chicken, fish, salads, vegetables. Denies prior history of intolerance to chewing/swallowing. Denies food allergies. Denies prior intake of vitamins/supplements PTA. Pt denies nausea/vomiting at this time, denies hx of constipation or diarrhea.     Pt reports UBW ~177-180 lbs. Dosing wt: (10/22): 169.6 lbs. Noted with apparent wt loss of 7.4 lbs/4.2% x ~1 week. Pt endorsed likelihood of wt loss 2/2 suboptimal PO intake x 1 week.     Pt observed with lunch meal at bedside. Per family, pt instructed to not eat until 4pm. Pt and family educated on menu ordering procedures and encouraged to utilize to maximize PO intake potential. Pt and family expressed understanding of same. Denied offer for nutrition supplement at this time. Will monitor.

## 2019-10-22 NOTE — CHART NOTE - NSCHARTNOTEFT_GEN_A_CORE
Interventional Neuro- Radiology   Procedure Note PA-C    Procedure: Selective Cerebral Angiography   Pre- Procedure Diagnosis: SAH   Post- Procedure Diagnosis:    : Dr Vick Jiménez  Fellow:    Physician Assistant: Maureen Reid PA-C    Nurse:                    Vicky Ramirez RN  Radio  Anesthesiologist:  Sheath:    I/Os: EBL less than 10cc  IV fluids:     cc   Urine output     cc    Contrast Omnipaque 240      cc        baseline ACT         Antibiotics:      Vitals: BP         HR      Spo2       Preliminary Report:  Using a 5 [place]ian Fubuki sheath to the right groin under MAC sedation via   left vertebral artery,  left common carotid artery, left external carotid artery, right vertebral artery,  right common carotid artery, right external carotid artery  a selective cerebral angiography was performed and  demonstrated                                            Official note to follow).  Patient tolerated procedure well, hemodynamically stable, no change in neurological status compared to baseline. Results discussed with neurosurgery, patient and patient's  family. Right groin sheath was removed,  manual compression held to hemostasis for 20 minutes, no active bleeding, no hematoma, quick clot and safeguard balloon dressing applied at Interventional Neuro- Radiology   Procedure Note PA-C    Procedure: Selective Cerebral Angiography   Pre- Procedure Diagnosis: SAH   Post- Procedure Diagnosis:    : Dr Vick Jiménez  Fellow:    Physician Assistant: Maureen Reid PA-C    Nurse:                    Vicky Ramirez RN  Radiologic Tech:    Jeny Griffin   Anesthesiologist:  Sheath:    I/Os: EBL less than 10cc  IV fluids:     cc   Urine output     cc    Contrast Omnipaque 240      cc        baseline ACT     Antibiotics:      Vitals: BP         HR      Spo2       Preliminary Report:  Using a 5 [place] Fubuki sheath to the right groin under MAC sedation via   left vertebral artery,  left common carotid artery, left external carotid artery, right vertebral artery,  right common carotid artery, right external carotid artery  a selective cerebral angiography was performed and  demonstrated                                            Official note to follow).  Patient tolerated procedure well, hemodynamically stable, no change in neurological status compared to baseline. Results discussed with neurosurgery, patient and patient's  family. Right groin sheath was removed,  manual compression held to hemostasis for 20 minutes, no active bleeding, no hematoma, quick clot and safeguard balloon dressing applied at Interventional Neuro- Radiology   Procedure Note PA-C    Procedure: Selective Cerebral Angiography   Pre- Procedure Diagnosis: SAH   Post- Procedure Diagnosis:    : Dr Vick Jiménez  Fellow:    Physician Assistant: Maureen Reid PA-C    Nurse:                    Vicky Ramirez RN  Radiologic Tech:    Jeny Griffin LRT  Anesthesiologist:    Dr Bharat Mccollum  Sheath:    I/Os: EBL less than 10cc  IV fluids:     cc   Urine output     cc   Contrast Omnipaque 240      cc        baseline ACT     Antibiotics:    Vitals: BP         HR      Spo2       Preliminary Report:  Using a 5 [place] Fubuki sheath to the right groin under general sedation via   left vertebral artery,  left common carotid artery, left external carotid artery, right vertebral artery,  right common carotid artery, right external carotid artery  a selective cerebral angiography was performed and demonstrated                                            Official note to follow.  Patient tolerated procedure well, hemodynamically stable, no change in neurological status compared to baseline. Results discussed with neurosurgery, patient and patient's family. Right groin sheath was removed,  manual compression held to hemostasis for 20 minutes, no active bleeding, no hematoma, quick clot and safeguard balloon dressing applied at Interventional Neuro- Radiology   Procedure Note PA-C    Procedure: Selective Cerebral Angiography   Pre- Procedure Diagnosis: SAH   Post- Procedure Diagnosis:    : Dr Vick Jiménez  Resident:  Dr Joseph Linares   Physician Assistant: Maureen Reid PA-C    Nurse:                    Vicky Ramirez RN  Radiologic Tech:    Jeny Griffin LRT  Anesthesiologist:    Dr Bharat Mccollum  Sheath:    I/Os: EBL less than 10cc  IV fluids:     cc   Urine output     cc  Contrast Omnipaque 240      cc        baseline ACT     Antibiotics:    Vitals: BP         HR      Spo2       Preliminary Report:  Using a 5 [place] Fubuki sheath to the right groin under general sedation via  left vertebral artery, left internal carotid artery, left external carotid artery, right vertebral artery, right internal carotid artery, right external carotid artery a selective cerebral angiography was performed and demonstrated                                            Official note to follow.  Patient tolerated procedure well, hemodynamically stable, no change in neurological status compared to baseline. Results discussed with neurosurgery, patient and patient's family. Right groin sheath was removed,  manual compression held to hemostasis for 20 minutes, no active bleeding, no hematoma, quick clot and safeguard balloon dressing applied at Interventional Neuro- Radiology   Procedure Note PA-C    Procedure: Selective Cerebral Angiography and WEB device deployment   Pre- Procedure Diagnosis: SAH ruptured ACOM artery aneurysm   Post- Procedure Diagnosis:     : Dr Vick Jiménez  Resident:  Dr Joseph Linares   Physician Assistant: Maureen Reid PA-C    Nurse:                    Vicky Ramirez RN  Radiologic Tech:    Jeny Griffin LRT  Anesthesiologist:    Dr Bharat Mccollum  Sheath:                  5 Telugu Fubuki sheath     I/Os: EBL less than 10cc  IV fluids: 400cc Urine output 400cc  Contrast Omnipaque 240  122cc     baseline     Heparin 4.000 units IVpush        ACT 24     Antibiotics: Ancef 2 grams           Vitals: /76       HR  72   Spo2 100%      Preliminary Report:  Using a 5 Telugu Fubuki sheath to the right groin under general sedation via  left vertebral artery, left internal carotid artery, left external carotid artery, right vertebral artery, right internal carotid artery, right external carotid artery a selective cerebral angiography was performed and demonstrated an ACOM artery aneurysm. Patient underwent successful WEB device placement for treatment of ruptured aneurysm. Official note to follow.  Patient tolerated procedure well, hemodynamically stable, no change in neurological status compared to baseline. Results discussed with neurosurgery, patient and patient's family. Right groin sheath was removed, manual compression held to hemostasis for 20 minutes, no active bleeding, no hematoma, quick clot and safeguard balloon dressing applied at 11:30am. Interventional Neuro- Radiology   Procedure Note PA-C    Procedure: Selective Cerebral Angiography and WEB device deployment   Pre- Procedure Diagnosis: SAH ruptured ACOM artery aneurysm   Post- Procedure Diagnosis: complete occlusion of ACOM artery aneurysm s/post WEB device placement     : Dr Vick Jiménez  Resident:  Dr Joseph Linares   Physician Assistant: Maureen Reid PA-C    Nurse:                    Vicky Ramirez RN  Radiologic Tech:    Jeny Griffin LRT  Anesthesiologist:    Dr Bharat Arndt   Sheath:                  5 Martiniquais Fubuki sheath     I/Os: EBL less than 10cc  IV fluids: 400cc Urine output 400cc  Contrast Omnipaque 240  122cc     baseline     Heparin 4.000 units IVpush        ACT 24     Antibiotics: Ancef 2 grams           Vitals: /76       HR  72   Spo2 100%      Preliminary Report:  Using a 5 Martiniquais Fubuki sheath to the right groin under general sedation via  left vertebral artery, left internal carotid artery, left external carotid artery, right vertebral artery, right internal carotid artery, right external carotid artery a selective cerebral angiography was performed and demonstrated an ACOM artery aneurysm. Patient underwent successful WEB device placement for treatment of ruptured aneurysm. Official note to follow.  Patient tolerated procedure well, hemodynamically stable, no change in neurological status compared to baseline. Results discussed with neurosurgery, patient and patient's family. Right groin sheath was removed, manual compression held to hemostasis for 20 minutes, no active bleeding, no hematoma, quick clot and safeguard balloon dressing applied at 11:30am. Disposition Neuro ICU bed 10. Interventional Neuro- Radiology   Procedure Note PA-C    Procedure: Selective Cerebral Angiography and WEB device deployment   Pre- Procedure Diagnosis: SAH ruptured ACOM artery aneurysm   Post- Procedure Diagnosis: complete occlusion of ACOM artery aneurysm s/post WEB device placement     : Dr Vick Jiménez  Resident:  Dr Joseph Linares   Physician Assistant: Maureen Reid PA-C    Nurse:                    Vicky Ramirez RN  Radiologic Tech:    Jeny Griffin LRT  Anesthesiologist:    Dr Bharat Arndt   Sheath:                  5 Belgian Fubuki sheath     I/Os: EBL less than 10cc  IV fluids: 400cc Urine output 400cc  Contrast Omnipaque 240  122cc         baseline     Heparin 4.000 units IVpush             Antibiotics: Ancef 2 grams           Vitals: /76       HR  72   Spo2 100%      Preliminary Report:  Using a 5 Belgian Fubuki sheath to the right groin under general sedation via  left vertebral artery, left internal carotid artery, left external carotid artery, right vertebral artery, right internal carotid artery, right external carotid artery a selective cerebral angiography was performed and demonstrated an ACOM artery aneurysm. Patient underwent successful WEB device placement for treatment of ruptured aneurysm. Official note to follow.  Patient tolerated procedure well, hemodynamically stable, no change in neurological status compared to baseline. Results discussed with neurosurgery, patient and patient's family. Right groin sheath was removed, manual compression held to hemostasis for 20 minutes, no active bleeding, no hematoma, quick clot and safeguard balloon dressing applied at 11:30am. Disposition Neuro ICU bed 10.

## 2019-10-22 NOTE — CHART NOTE - NSCHARTNOTEFT_GEN_A_CORE
Interventional Neuro Radiology  Pre-Procedure Note CECILY    This is a 54 year old with no known PMH but has not seen a physician in decades presents with HA and dizziness. Last Mon afternoon started with migrating HA and neck pain. Friday was doing some chores outside and around 10pm noted return of headache, mostly on the top of the head. The pain kept increasing overnight. Couldn't sleep. Pain was the worst he could recall in recent memory. Endorses nausea but no vomiting. Dizziness is positional and worse when he bends over or stands up quickly. Denies neck pain, chest pain, light sensitivity, chest pain, sob, abd pain, diarrhea, rash. Pt endorsed some chills at home but no fever. Denies recent trauma. Did fall and hit his head two years ago, and didn't seek treatment. (21 Oct 2019 17:54)    Allergies: No Known Allergies  PMHX: vitiligo  PSHX: no significant past surgical history  Social History:   FAMILY HISTORY:    Current Medications: acetaminophen   Tablet 650 milliGRAM(s) Oral every 6 hours PRN  chlorhexidine 4% Liquid 1 Application(s) Topical <User Schedule>  dexamethasone  Injectable 4 milliGRAM(s) IV Push every 6 hours  influenza   Vaccine 0.5 milliLiter(s) IntraMuscular once  levETIRAcetam 500 milliGRAM(s) Oral two times a day  niMODipine 60 milliGRAM(s) Oral every 4 hours  sodium chloride 0.9%. 1000 milliLiter(s) IV Continuous <Continuous>    CBC                        15.6   10.48 )-----------( 165      ( 21 Oct 2019 19:25 )             44.3     BMP    140  |  105  |  15  ----------------------------<  94  3.9   |  22  |  0.76    Ca    8.6      21 Oct 2019 19:25  Phos  2.8     10-21  Mg     2.1     10-21    TPro  7.8  /  Alb  4.6  /  TBili  0.8  /  DBili  x   /  AST  14  /  ALT  29  /  AlkPhos  63  10-21      Blood Bank: B positive         Assessment/Plan:   This is a 54 year old right hand dominant male  presents with   Procedure, goals, risks, benefits and alternatives  were discussed with patient and patient's family.  All questions were answered.  Risks include but are not limited to stroke, vessel injury, hemorrhage, and or right  groin hematoma.  Patient demonstrates understanding of all risks involved with this procedure and wishes to continue. Appropriate content was obtained from patient and consent is in the patient's chart.

## 2019-10-22 NOTE — DISCHARGE NOTE NURSING/CASE MANAGEMENT/SOCIAL WORK - NSDCPEPTSTRK_GEN_ALL_CORE
Prescribed medications/Risk factors for stroke/Stroke support groups for patients, families, and friends/Stroke warning signs and symptoms/Need for follow up after discharge/Stroke education booklet/Call 911 for stroke/Signs and symptoms of stroke

## 2019-10-22 NOTE — DIETITIAN INITIAL EVALUATION ADULT. - SIGNS/SYMPTOMS
diet recall (</=75% of est energy needs x 1 week), wt loss of 4.2% x ~1 week pt s/p HH1 mF1 SAH s/p Pitts embolization of ruptured R ACOMM aneurysm

## 2019-10-22 NOTE — CHART NOTE - NSCHARTNOTEFT_GEN_A_CORE
CAPRINI SCORE [CLOT] Score on Admission for     AGE RELATED RISK FACTORS                                                       MOBILITY RELATED FACTORS  [x] Age 41-60 years                                            (1 Point)                  [ ] Bed rest                                                        (1 Point)  [ ] Age: 61-74 years                                           (2 Points)                 [ ] Plaster cast                                                   (2 Points)  [ ] Age= 75 years                                              (3 Points)                 [ ] Bed bound for more than 72 hours                 (2 Points)    DISEASE RELATED RISK FACTORS                                               GENDER SPECIFIC FACTORS  [ ] Edema in the lower extremities                       (1 Point)                  [ ] Pregnancy                                                     (1 Point)  [ ] Varicose veins                                               (1 Point)                  [ ] Post-partum < 6 weeks                                   (1 Point)             [ ] BMI > 25 Kg/m2                                            (1 Point)                  [ ] Hormonal therapy  or oral contraception          (1 Point)                 [ ] Sepsis (in the previous month)                        (1 Point)                  [ ] History of pregnancy complications                 (1 point)  [ ] Pneumonia or serious lung disease                                               [ ] Unexplained or recurrent                     (1 Point)           (in the previous month)                               (1 Point)  [ ] Abnormal pulmonary function test                     (1 Point)                 SURGERY RELATED RISK FACTORS (include planned surgeries)  [ ] Acute myocardial infarction                              (1 Point)                 [ ]  Section                                             (1 Point)  [ ] Congestive heart failure (in the previous month)  (1 Point)         [ ] Minor surgery                                                  (1 Point)   [ ] Inflammatory bowel disease                             (1 Point)                 [ ] Arthroscopic surgery                                        (2 Points)  [ ] Central venous access                                      (2 Points)                [ ] General surgery lasting more than 45 minutes   (2 Points)       [x] Stroke (in the previous month)                          (5 Points)               [ ] Elective arthroplasty                                         (5 Points)            [ ] current or past malignancy                              (2 Points)                                                                                                       HEMATOLOGY RELATED FACTORS                                                 TRAUMA RELATED RISK FACTORS  [ ] Prior episodes of VTE                                     (3 Points)                [ ] Fracture of the hip, pelvis, or leg                       (5 Points)  [ ] Positive family history for VTE                         (3 Points)                 [ ] Acute spinal cord injury (in the previous month)  (5 Points)  [ ] Prothrombin 68722 A                                     (3 Points)                 [ ] Paralysis  (less than 1 month)                             (5 Points)  [ ] Factor V Leiden                                             (3 Points)                  [ ] Multiple Trauma within 1 month                        (5 Points)  [ ] Lupus anticoagulants                                     (3 Points)                                                           [ ] Anticardiolipin antibodies                               (3 Points)                                                       [ ] High homocysteine in the blood                      (3 Points)                                             [ ] Other congenital or acquired thrombophilia      (3 Points)                                                [ ] Heparin induced thrombocytopenia                  (3 Points)                                          Total Score [      6  ]    Risk:  Very low 0   Low 1 to 2   Moderate 3 to 4   High =5       VTE Prophylasix Recommednations:  [x] mechanical pneumatic compression devices                                      [ ] contraindicated: _____________________  [ ] chemo prophylasix                                                                                   [x] contraindicated _____________________    **** HIGH LIKELIHOOD DVT PRESENT ON ADMISSION  [x] (please order LE dopplers within 24 hours of admission)

## 2019-10-22 NOTE — DIETITIAN INITIAL EVALUATION ADULT. - ENERGY NEEDS
Ht: 69"  Wt: 169.4 lbs   BMI: 25.1 kg/m2   IBW: 160 lbs (+/-10%)    106 % IBW  Edema: no edema noted         Skin: surgical incision s/p angio

## 2019-10-22 NOTE — DIETITIAN INITIAL EVALUATION ADULT. - MALNUTRITION
Acute moderate protein calorie malnutrition Acute non-severe protein-calorie malnutrition in context of acute illness

## 2019-10-23 LAB
ANION GAP SERPL CALC-SCNC: 13 MMOL/L — SIGNIFICANT CHANGE UP (ref 5–17)
BUN SERPL-MCNC: 18 MG/DL — SIGNIFICANT CHANGE UP (ref 7–23)
CALCIUM SERPL-MCNC: 8.9 MG/DL — SIGNIFICANT CHANGE UP (ref 8.4–10.5)
CHLORIDE SERPL-SCNC: 102 MMOL/L — SIGNIFICANT CHANGE UP (ref 96–108)
CO2 SERPL-SCNC: 20 MMOL/L — LOW (ref 22–31)
CREAT SERPL-MCNC: 0.77 MG/DL — SIGNIFICANT CHANGE UP (ref 0.5–1.3)
GLUCOSE SERPL-MCNC: 149 MG/DL — HIGH (ref 70–99)
HCT VFR BLD CALC: 43.3 % — SIGNIFICANT CHANGE UP (ref 39–50)
HGB BLD-MCNC: 15.1 G/DL — SIGNIFICANT CHANGE UP (ref 13–17)
MAGNESIUM SERPL-MCNC: 2.2 MG/DL — SIGNIFICANT CHANGE UP (ref 1.6–2.6)
MCHC RBC-ENTMCNC: 29.8 PG — SIGNIFICANT CHANGE UP (ref 27–34)
MCHC RBC-ENTMCNC: 34.9 GM/DL — SIGNIFICANT CHANGE UP (ref 32–36)
MCV RBC AUTO: 85.4 FL — SIGNIFICANT CHANGE UP (ref 80–100)
NRBC # BLD: 0 /100 WBCS — SIGNIFICANT CHANGE UP (ref 0–0)
PHOSPHATE SERPL-MCNC: 3.6 MG/DL — SIGNIFICANT CHANGE UP (ref 2.5–4.5)
PLATELET # BLD AUTO: 181 K/UL — SIGNIFICANT CHANGE UP (ref 150–400)
POTASSIUM SERPL-MCNC: 4.2 MMOL/L — SIGNIFICANT CHANGE UP (ref 3.5–5.3)
POTASSIUM SERPL-SCNC: 4.2 MMOL/L — SIGNIFICANT CHANGE UP (ref 3.5–5.3)
RBC # BLD: 5.07 M/UL — SIGNIFICANT CHANGE UP (ref 4.2–5.8)
RBC # FLD: 12.5 % — SIGNIFICANT CHANGE UP (ref 10.3–14.5)
SODIUM SERPL-SCNC: 135 MMOL/L — SIGNIFICANT CHANGE UP (ref 135–145)
WBC # BLD: 14.36 K/UL — HIGH (ref 3.8–10.5)
WBC # FLD AUTO: 14.36 K/UL — HIGH (ref 3.8–10.5)

## 2019-10-23 PROCEDURE — 70553 MRI BRAIN STEM W/O & W/DYE: CPT | Mod: 26

## 2019-10-23 PROCEDURE — 70544 MR ANGIOGRAPHY HEAD W/O DYE: CPT | Mod: 26,59

## 2019-10-23 PROCEDURE — 93970 EXTREMITY STUDY: CPT | Mod: 26

## 2019-10-23 PROCEDURE — 99291 CRITICAL CARE FIRST HOUR: CPT

## 2019-10-23 RX ORDER — ACETAMINOPHEN 500 MG
1000 TABLET ORAL ONCE
Refills: 0 | Status: COMPLETED | OUTPATIENT
Start: 2019-10-23 | End: 2019-10-23

## 2019-10-23 RX ORDER — ENOXAPARIN SODIUM 100 MG/ML
40 INJECTION SUBCUTANEOUS
Refills: 0 | Status: DISCONTINUED | OUTPATIENT
Start: 2019-10-23 | End: 2019-11-01

## 2019-10-23 RX ORDER — OXYCODONE HYDROCHLORIDE 5 MG/1
5 TABLET ORAL EVERY 4 HOURS
Refills: 0 | Status: DISCONTINUED | OUTPATIENT
Start: 2019-10-23 | End: 2019-10-29

## 2019-10-23 RX ORDER — OXYCODONE HYDROCHLORIDE 5 MG/1
10 TABLET ORAL EVERY 4 HOURS
Refills: 0 | Status: DISCONTINUED | OUTPATIENT
Start: 2019-10-23 | End: 2019-10-30

## 2019-10-23 RX ADMIN — NIMODIPINE 30 MILLIGRAM(S): 60 SOLUTION ORAL at 20:12

## 2019-10-23 RX ADMIN — Medication 62.5 MILLIMOLE(S): at 02:10

## 2019-10-23 RX ADMIN — FAMOTIDINE 20 MILLIGRAM(S): 10 INJECTION INTRAVENOUS at 18:03

## 2019-10-23 RX ADMIN — Medication 325 MILLIGRAM(S): at 05:15

## 2019-10-23 RX ADMIN — Medication 4 MILLIGRAM(S): at 05:15

## 2019-10-23 RX ADMIN — CHLORHEXIDINE GLUCONATE 1 APPLICATION(S): 213 SOLUTION TOPICAL at 22:05

## 2019-10-23 RX ADMIN — NIMODIPINE 30 MILLIGRAM(S): 60 SOLUTION ORAL at 16:05

## 2019-10-23 RX ADMIN — NIMODIPINE 30 MILLIGRAM(S): 60 SOLUTION ORAL at 10:21

## 2019-10-23 RX ADMIN — OXYCODONE HYDROCHLORIDE 10 MILLIGRAM(S): 5 TABLET ORAL at 13:11

## 2019-10-23 RX ADMIN — OXYCODONE HYDROCHLORIDE 10 MILLIGRAM(S): 5 TABLET ORAL at 14:00

## 2019-10-23 RX ADMIN — NIMODIPINE 30 MILLIGRAM(S): 60 SOLUTION ORAL at 14:21

## 2019-10-23 RX ADMIN — Medication 4 MILLIGRAM(S): at 13:32

## 2019-10-23 RX ADMIN — POLYETHYLENE GLYCOL 3350 17 GRAM(S): 17 POWDER, FOR SOLUTION ORAL at 18:03

## 2019-10-23 RX ADMIN — NIMODIPINE 30 MILLIGRAM(S): 60 SOLUTION ORAL at 22:03

## 2019-10-23 RX ADMIN — Medication 4 MILLIGRAM(S): at 18:03

## 2019-10-23 RX ADMIN — ENOXAPARIN SODIUM 40 MILLIGRAM(S): 100 INJECTION SUBCUTANEOUS at 18:03

## 2019-10-23 RX ADMIN — FAMOTIDINE 20 MILLIGRAM(S): 10 INJECTION INTRAVENOUS at 05:15

## 2019-10-23 RX ADMIN — Medication 400 MILLIGRAM(S): at 16:15

## 2019-10-23 RX ADMIN — NIMODIPINE 30 MILLIGRAM(S): 60 SOLUTION ORAL at 18:03

## 2019-10-23 RX ADMIN — NIMODIPINE 30 MILLIGRAM(S): 60 SOLUTION ORAL at 08:14

## 2019-10-23 RX ADMIN — Medication 1000 MILLIGRAM(S): at 16:45

## 2019-10-23 NOTE — PROGRESS NOTE ADULT - SUBJECTIVE AND OBJECTIVE BOX
SUMMARY: 54M with no known PMH but has not seen a physician in decades presents with HA and dizziness. Last Mon afternoon started with migrating HA and neck pain. Friday was doing some chores outside and around 10pm noted return of headache, mostly on the top of the head. The pain kept increasing overnight. Couldn't sleep. Pain was the worst he could recall in recent memory. Endorses nausea but no vomiting. Dizziness is positional and worse when he bends over or stands up quickly. Denies neck pain, chest pain, light sensitivity, chest pain, sob, abd pain, diarrhea, rash. Pt endorsed some chills at home but no fever. Denies recent trauma. Did fall and hit his head two years ago, and didn't seek treatment. 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     Overnight Events:     ROS: negative [] unable to obtain as patient is comatose/intubated/aphasic []     VITALS:   T(C): 36.8 (10-23-19 @ 07:00), Max: 37.3 (10-22-19 @ 19:00)  HR: 70 (10-23-19 @ 08:00) (62 - 100)  BP: 119/79 (10-23-19 @ 08:00) (104/68 - 127/84)  RR: 14 (10-23-19 @ 08:00) (13 - 27)  SpO2: 99% (10-23-19 @ 08:00) (94% - 100%)    10-22-19 @ 07:01  -  10-23-19 @ 07:00  --------------------------------------------------------  IN: 2245 mL / OUT: 2125 mL / NET: 120 mL      LABS:                          14.9   13.60 )-----------( 177      ( 22 Oct 2019 23:30 )             41.4     10-22    135  |  107  |  14  ----------------------------<  169<H>  4.0   |  18<L>  |  0.74    Ca    8.0<L>      22 Oct 2019 23:30  Phos  2.0     10-22  Mg     2.0     10-22    TPro  7.8  /  Alb  4.6  /  TBili  0.8  /  DBili  x   /  AST  14  /  ALT  29  /  AlkPhos  63  10-21    PT/INR - ( 21 Oct 2019 14:23 )   PT: 13.3 sec;   INR: 1.16 ratio         PTT - ( 21 Oct 2019 14:23 )  PTT:21.4 sec  MEDS:  MEDICATIONS  (STANDING):  aspirin enteric coated 325 milliGRAM(s) Oral <User Schedule>  chlorhexidine 4% Liquid 1 Application(s) Topical <User Schedule>  dexamethasone  Injectable 4 milliGRAM(s) IV Push every 6 hours  famotidine    Tablet 20 milliGRAM(s) Oral two times a day  influenza   Vaccine 0.5 milliLiter(s) IntraMuscular once  niMODipine 30 milliGRAM(s) Oral every 2 hours  polyethylene glycol 3350 17 Gram(s) Oral daily  senna 1 Tablet(s) Oral daily      EXAMINATION:  General:  calm  HEENT:  MMM  Neuro:  awake, alert, oriented x 3, follows commands, moves all extremities  Cards:  RRR  Respiratory:  no respiratory distress  Adomen:  soft  Extremities:  no edema  Skin:  warm/dry SUMMARY: 54M with no known PMH but has not seen a physician in decades presents with HA and dizziness. Last Mon afternoon started with migrating HA and neck pain. Friday was doing some chores outside and around 10pm noted return of headache, mostly on the top of the head. The pain kept increasing overnight. Couldn't sleep. Pain was the worst he could recall in recent memory. Endorses nausea but no vomiting. Dizziness is positional and worse when he bends over or stands up quickly. Denies neck pain, chest pain, light sensitivity, chest pain, sob, abd pain, diarrhea, rash. Pt endorsed some chills at home but no fever. Denies recent trauma. Did fall and hit his head two years ago, and didn't seek treatment. 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     Overnight Events: Ambulating.     ROS: Negative unless specified    VITALS:   T(C): 36.8 (10-23-19 @ 07:00), Max: 37.3 (10-22-19 @ 19:00)  HR: 70 (10-23-19 @ 08:00) (62 - 100)  BP: 119/79 (10-23-19 @ 08:00) (104/68 - 127/84)  RR: 14 (10-23-19 @ 08:00) (13 - 27)  SpO2: 99% (10-23-19 @ 08:00) (94% - 100%)    10-22-19 @ 07:01  -  10-23-19 @ 07:00  --------------------------------------------------------  IN: 2245 mL / OUT: 2125 mL / NET: 120 mL      LABS:                          14.9   13.60 )-----------( 177      ( 22 Oct 2019 23:30 )             41.4     10-22    135  |  107  |  14  ----------------------------<  169<H>  4.0   |  18<L>  |  0.74    Ca    8.0<L>      22 Oct 2019 23:30  Phos  2.0     10-22  Mg     2.0     10-22    TPro  7.8  /  Alb  4.6  /  TBili  0.8  /  DBili  x   /  AST  14  /  ALT  29  /  AlkPhos  63  10-21    PT/INR - ( 21 Oct 2019 14:23 )   PT: 13.3 sec;   INR: 1.16 ratio         PTT - ( 21 Oct 2019 14:23 )  PTT:21.4 sec  MEDS:  MEDICATIONS  (STANDING):  aspirin enteric coated 325 milliGRAM(s) Oral <User Schedule>  chlorhexidine 4% Liquid 1 Application(s) Topical <User Schedule>  dexamethasone  Injectable 4 milliGRAM(s) IV Push every 6 hours  famotidine    Tablet 20 milliGRAM(s) Oral two times a day  influenza   Vaccine 0.5 milliLiter(s) IntraMuscular once  niMODipine 30 milliGRAM(s) Oral every 2 hours  polyethylene glycol 3350 17 Gram(s) Oral daily  senna 1 Tablet(s) Oral daily      EXAMINATION:  General:  calm  HEENT:  MMM. PERRL. EOMI.  Neuro:  awake, alert, oriented x 3, b/l UE - 5/5. b/l LE - 5/5.  Cards:  RRR  Respiratory:  CTAB.   Abdomen:  soft  Extremities:  no edema  Skin:  warm/dry SUMMARY: 54M with no known PMH but has not seen a physician in decades presents with HA and dizziness. Last Mon afternoon started with migrating HA and neck pain. Friday was doing some chores outside and around 10pm noted return of headache, mostly on the top of the head. The pain kept increasing overnight. Couldn't sleep. Pain was the worst he could recall in recent memory. Endorses nausea but no vomiting. Dizziness is positional and worse when he bends over or stands up quickly. Denies neck pain, chest pain, light sensitivity, chest pain, sob, abd pain, diarrhea, rash. Pt endorsed some chills at home but no fever. Denies recent trauma. Did fall and hit his head two years ago, and didn't seek treatment. 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     Overnight Events: Ambulating.     ROS: Negative unless specified    VITALS:   T(C): 36.8 (10-23-19 @ 07:00), Max: 37.3 (10-22-19 @ 19:00)  HR: 70 (10-23-19 @ 08:00) (62 - 100)  BP: 119/79 (10-23-19 @ 08:00) (104/68 - 127/84)  RR: 14 (10-23-19 @ 08:00) (13 - 27)  SpO2: 99% (10-23-19 @ 08:00) (94% - 100%)    10-22-19 @ 07:01  -  10-23-19 @ 07:00  --------------------------------------------------------  IN: 2245 mL / OUT: 2125 mL / NET: 120 mL      LABS:                          14.9   13.60 )-----------( 177      ( 22 Oct 2019 23:30 )             41.4     10-22    135  |  107  |  14  ----------------------------<  169<H>  4.0   |  18<L>  |  0.74    Ca    8.0<L>      22 Oct 2019 23:30  Phos  2.0     10-22  Mg     2.0     10-22    TPro  7.8  /  Alb  4.6  /  TBili  0.8  /  DBili  x   /  AST  14  /  ALT  29  /  AlkPhos  63  10-21    PT/INR - ( 21 Oct 2019 14:23 )   PT: 13.3 sec;   INR: 1.16 ratio         PTT - ( 21 Oct 2019 14:23 )  PTT:21.4 sec  MEDS:  MEDICATIONS  (STANDING):  aspirin enteric coated 325 milliGRAM(s) Oral <User Schedule>  chlorhexidine 4% Liquid 1 Application(s) Topical <User Schedule>  dexamethasone  Injectable 4 milliGRAM(s) IV Push every 6 hours  famotidine    Tablet 20 milliGRAM(s) Oral two times a day  influenza   Vaccine 0.5 milliLiter(s) IntraMuscular once  niMODipine 30 milliGRAM(s) Oral every 2 hours  polyethylene glycol 3350 17 Gram(s) Oral daily  senna 1 Tablet(s) Oral daily      EXAMINATION:  General:  calm  HEENT:  MMM. PERRL. EOMI.  Neuro:  awake, alert, oriented x 3, b/l UE - 5/5. b/l LE - 5/5.  Cards:  RRR  Respiratory:  CTAB.   Abdomen:  soft  Extremities:  no edema  Skin:  warm/dry. Vitiligo. SUMMARY: 54M with no known PMH but has not seen a physician in decades presents with HA and dizziness. Last Mon afternoon started with migrating HA and neck pain. Friday was doing some chores outside and around 10pm noted return of headache, mostly on the top of the head. The pain kept increasing overnight. Couldn't sleep. Pain was the worst he could recall in recent memory. Endorses nausea but no vomiting. Dizziness is positional and worse when he bends over or stands up quickly. Denies neck pain, chest pain, light sensitivity, chest pain, sob, abd pain, diarrhea, rash. Pt endorsed some chills at home but no fever. Denies recent trauma. Did fall and hit his head two years ago, and didn't seek treatment. 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     24 HOUR EVENTS:   Ambulating.     ROS: Negative unless specified    VITALS:   T(C): 36.8 (10-23-19 @ 07:00), Max: 37.3 (10-22-19 @ 19:00)  HR: 70 (10-23-19 @ 08:00) (62 - 100)  BP: 119/79 (10-23-19 @ 08:00) (104/68 - 127/84)  RR: 14 (10-23-19 @ 08:00) (13 - 27)  SpO2: 99% (10-23-19 @ 08:00) (94% - 100%)    10-22-19 @ 07:01  -  10-23-19 @ 07:00  --------------------------------------------------------  IN: 2245 mL / OUT: 2125 mL / NET: 120 mL      LABS:                          14.9   13.60 )-----------( 177      ( 22 Oct 2019 23:30 )             41.4     10-22    135  |  107  |  14  ----------------------------<  169<H>  4.0   |  18<L>  |  0.74    Ca    8.0<L>      22 Oct 2019 23:30  Phos  2.0     10-22  Mg     2.0     10-22    TPro  7.8  /  Alb  4.6  /  TBili  0.8  /  DBili  x   /  AST  14  /  ALT  29  /  AlkPhos  63  10-21    PT/INR - ( 21 Oct 2019 14:23 )   PT: 13.3 sec;   INR: 1.16 ratio         PTT - ( 21 Oct 2019 14:23 )  PTT:21.4 sec  MEDS:  MEDICATIONS  (STANDING):  aspirin enteric coated 325 milliGRAM(s) Oral <User Schedule>  chlorhexidine 4% Liquid 1 Application(s) Topical <User Schedule>  dexamethasone  Injectable 4 milliGRAM(s) IV Push every 6 hours  famotidine    Tablet 20 milliGRAM(s) Oral two times a day  influenza   Vaccine 0.5 milliLiter(s) IntraMuscular once  niMODipine 30 milliGRAM(s) Oral every 2 hours  polyethylene glycol 3350 17 Gram(s) Oral daily  senna 1 Tablet(s) Oral daily      EXAMINATION:  General:  calm  HEENT:  MMM. PERRL. EOMI.  Neuro:  awake, alert, oriented x 3, b/l UE - 5/5. b/l LE - 5/5.  Cards:  RRR  Respiratory:  CTAB.   Abdomen:  soft  Extremities:  no edema  Skin:  warm/dry. Vitiligo most prominent around neck and arms/hands

## 2019-10-23 NOTE — PROGRESS NOTE ADULT - SUBJECTIVE AND OBJECTIVE BOX
Patient seen and examined at bedside.    --Anticoagulation--  aspirin enteric coated 325 milliGRAM(s) Oral <User Schedule>    T(C): 37.2 (10-22-19 @ 23:00), Max: 37.3 (10-22-19 @ 19:00)  HR: 68 (10-23-19 @ 02:00) (62 - 100)  BP: 104/75 (10-23-19 @ 02:00) (104/75 - 142/85)  RR: 15 (10-23-19 @ 02:00) (13 - 27)  SpO2: 96% (10-23-19 @ 02:00) (95% - 100%)  Wt(kg): --    Exam:  Intact

## 2019-10-23 NOTE — SWALLOW BEDSIDE ASSESSMENT ADULT - SWALLOW EVAL: DIAGNOSIS
Consult order received and chart reviewed.  Per d/w Nsx, a swallowing evaluation is not indicated at this time and MD to Cx order.  Request MD to reconsult this dept should a change in status occur.  Pt recommends d/c home.

## 2019-10-23 NOTE — PROGRESS NOTE ADULT - ASSESSMENT
Summary: 53 yo male HH1 mF1 SAH 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)  PBD # 2    NEURO: q1h neuro checks;   On Dexamethasone 4iv q6h for 3 days per NSGY  on  mg po daily  Delayed Cerebral Ischemia Management: Serial screening TCDs, euvolemia,  Nimodipine 30 q2H (since patient is bradycardic)  Pain: PRN analgesics  Activity: [X] OOB as tolerated [] Bedrest [X] PT [X] OT [] PMNR    PULM: SpO2 > 92%  Incentive spirometry    CV:  SBP goal 100 - 180  TTE    RENAL:  Fluids: IVL    GI:  Diet: Regular diet  GI prophylaxis - Famotidine 20 BID (while on steroids)  Bowel regimen [X] colace [X] senna [] other:    ENDO:   Goal euglycemia (-180)    HEME/ONC:  VTE prophylaxis: [X] SCDs [] chemoprophylaxis [X] hold chemoprophylaxis today; will start tomorrow    ID: Monitor for fevers    MISC:    SOCIAL/FAMILY:  [] awaiting [X] updated at bedside [] family meeting    CODE STATUS:  [X] Full Code [] DNR [] DNI [] Palliative/Comfort Care    DISPOSITION:  [X] ICU [] Stroke Unit [] Floor [] EMU [] RCU [] PCU    [X] Patient is at high risk of neurologic deterioration/death due to: Re-rupture, Delayed Cerebral Ischemia Summary: 55 yo male HH1 mF1 SAH 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)  PBD # 2/?8    NEURO: q1h neuro checks;   On Dexamethasone 4iv q6h for 3 days per NSGY  on  mg po daily  Delayed Cerebral Ischemia Management: Serial screening TCDs, euvolemia,  Nimodipine 30 q2H (since patient is bradycardic)  Pain: PRN analgesics  Activity: [X] OOB as tolerated [] Bedrest [X] PT [X] OT [] PMNR    PULM: SpO2 > 92%  Incentive spirometry    CV:  SBP goal 100 - 180  TTE    RENAL:  Fluids: IVL    GI:  Diet: Regular diet  GI prophylaxis - Famotidine 20 BID (while on steroids)  Bowel regimen [X] colace [X] senna [] other:    ENDO:   Goal euglycemia (-180)    HEME/ONC:  VTE prophylaxis: [X] SCDs [] chemoprophylaxis [X] hold chemoprophylaxis today; will start tomorrow    ID: Monitor for fevers    MISC:    SOCIAL/FAMILY:  [] awaiting [X] updated at bedside [] family meeting    CODE STATUS:  [X] Full Code [] DNR [] DNI [] Palliative/Comfort Care    DISPOSITION:  [X] ICU [] Stroke Unit [] Floor [] EMU [] RCU [] PCU    [X] Patient is at high risk of neurologic deterioration/death due to: Re-rupture, Delayed Cerebral Ischemia Summary: 55 yo male HH1 mF1 SAH 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 No segmental wall motion abnormalities    RENAL:  Fluids: IVL    GI:  Diet: Regular diet  GI prophylaxis - Famotidine 20 BID (while on steroids)  Bowel regimen [X] colace [X] senna [] other:  Last BM: none during hospitalization    ENDO:   Goal euglycemia (-180)    HEME/ONC:  VTE prophylaxis: [X] SCDs [] chemoprophylaxis [X] start Lovenox 40 sc qHS    ID: Monitor for fevers    MISC:    SOCIAL/FAMILY:  [] awaiting [X] updated at bedside [] family meeting    CODE STATUS:  [X] Full Code [] DNR [] DNI [] Palliative/Comfort Care    DISPOSITION:  [X] ICU [] Stroke Unit [] Floor [] EMU [] RCU [] PCU    [X] Patient is at high risk of neurologic deterioration/death due to: Re-rupture, Delayed Cerebral Ischemia Summary: 53 yo male HH1 mF1 SAH 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 No segmental wall motion abnormalities    RENAL:  Fluids: IVL    GI:  Diet: Regular diet  GI prophylaxis - Famotidine 20 BID (while on steroids)  Bowel regimen [X] colace [X] senna [] other:  Last BM: none during hospitalization    ENDO:   Goal euglycemia (-180)    HEME/ONC:  VTE prophylaxis: [X] SCDs [] chemoprophylaxis [X] start Lovenox 40 sc qHS    ID: Monitor for fevers    MISC:    SOCIAL/FAMILY:  [] awaiting [X] updated at bedside [] family meeting    CODE STATUS:  [X] Full Code [] DNR [] DNI [] Palliative/Comfort Care    DISPOSITION:  [X] ICU [] Stroke Unit [] Floor [] EMU [] RCU [] PCU.    [X] Patient is at high risk of neurologic deterioration/death due to: Re-rupture, Delayed Cerebral Ischemia

## 2019-10-23 NOTE — OCCUPATIONAL THERAPY INITIAL EVALUATION ADULT - PERTINENT HX OF CURRENT PROBLEM, REHAB EVAL
54M presents with HA and dizziness. Last Mon afternoon started with migrating HA and neck pain.  Pain was the worst he could recall in recent memory. Endorses nausea but no vomiting. Dizziness is positional and worse when he bends over or stands up quickly. Found to have SAH s/p Pitts embolization of ruptured R AComm aneurysm 10/21. SEE BELOW.

## 2019-10-23 NOTE — PROGRESS NOTE ADULT - ASSESSMENT
54M HH1 MF1 SAH s/p GRANGER of MARK Dumont PBROLAND#2  - cont neurochecks  - TCDs  - euvolemia  - nimodipine  - pain control

## 2019-10-23 NOTE — PROGRESS NOTE ADULT - SUBJECTIVE AND OBJECTIVE BOX
NSCU ATTENDING -- ADDITIONAL PROGRESS NOTE    Nighttime rounds were performed -- please refer to earlier Progress Note for HPI details.    T(C): 37.3 (10-23-19 @ 19:00), Max: 37.3 (10-23-19 @ 19:00)  HR: 67 (10-23-19 @ 20:00) (62 - 97)  BP: 138/86 (10-23-19 @ 20:00) (104/68 - 155/88)  RR: 19 (10-23-19 @ 20:00) (14 - 22)  SpO2: 97% (10-23-19 @ 20:00) (94% - 100%)  Wt(kg): --    Relevant labwork and imaging reviewed.    Neurologically intact.  MRI with no residual.  Out of bed and walking stairs today with PT.

## 2019-10-23 NOTE — OCCUPATIONAL THERAPY INITIAL EVALUATION ADULT - ADDITIONAL COMMENTS
CONTINUED. CT ANGIO HEAD & NECK 10/21: Subtle subarachnoid hemorrhage with focality at the anterior suprasellar cistern and left sylvian fissure. 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 Hypoplastic left A1 segment.No evidence of vasospasm.

## 2019-10-23 NOTE — OCCUPATIONAL THERAPY INITIAL EVALUATION ADULT - DIAGNOSIS, OT EVAL
Pt presents with no physical or cognitive deficits impacting ability to perform ADL or functional mobility

## 2019-10-23 NOTE — SWALLOW BEDSIDE ASSESSMENT ADULT - SLP PERTINENT HISTORY OF CURRENT PROBLEM
4M presents with HA and dizziness. Last Mon afternoon started with migrating HA and neck pain.  Pain was the worst he could recall in recent memory. Endorses nausea but no vomiting. Dizziness is positional and worse when he bends over or stands up quickly. Found to have SAH s/p Pitts embolization of ruptured R AComm aneurysm 10/21. 54M presents with HA and dizziness. Last Mon afternoon started with migrating HA and neck pain.  Pain was the worst he could recall in recent memory. Endorses nausea but no vomiting. Dizziness is positional and worse when he bends over or stands up quickly. Found to have SAH s/p Pitts embolization of ruptured R AComm aneurysm 10/21.

## 2019-10-23 NOTE — PHYSICAL THERAPY INITIAL EVALUATION ADULT - ADDITIONAL COMMENTS
Pt lives in pvt home with spouse, 3 steps to enter, +HR, 8 steps inside +HR. PLOF: Pt was independent for all mobility, transfers, ADL's, and amb without AD. (+) Work, (+) Drive.

## 2019-10-23 NOTE — PHYSICAL THERAPY INITIAL EVALUATION ADULT - PERTINENT HX OF CURRENT PROBLEM, REHAB EVAL
54M with no known PMH, p/w HA and dizziness. Last Mon afternoon started with migrating HA and neck pain. Friday return of HA, mostly on the top of the head. The pain kept increasing overnight. Couldn't sleep. Pain was the worst he could recall in recent memory. Endorses nausea but no vomiting. Dizziness is positional and worse when he bends over or stands up quickly. Did fall and hit his head two years ago, and didn't seek treatment. 54M s/p GRANGER of [place], no known PMH, p/w HA and dizziness. Last Mon afternoon started with migrating HA and neck pain. Friday return of HA, mostly on the top of the head. The pain kept increasing overnight. Couldn't sleep. Pain was the worst he could recall in recent memory. Endorses nausea but no vomiting. Dizziness is positional and worse when he bends over or stands up quickly. Did fall and hit his head two years ago, and didn't seek treatment.

## 2019-10-23 NOTE — PHYSICAL THERAPY INITIAL EVALUATION ADULT - PRECAUTIONS/LIMITATIONS, REHAB EVAL
CTH: IMPRESSION: Subtle subarachnoid hemorrhage with focality at the anterior suprasellar cistern and left sylvian fissure. 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 Hypoplastic left A1 segment. No evidence of vasospasm.

## 2019-10-23 NOTE — PROVIDER CONTACT NOTE (MEDICATION) - ACTION/TREATMENT ORDERED:
PA stated no further action at this time, hold dose and give next dose if able to. Will continue to monitor.

## 2019-10-23 NOTE — PHYSICAL THERAPY INITIAL EVALUATION ADULT - LEVEL OF INDEPENDENCE: SIT/SUPINE, REHAB EVAL
POSTOPERATIVE INSTRUCTIONS    COMFORT:   · Apply ice to the affected area for 30 minutes 3 times per day for 2 days after surgery.  · You may take Ibuprofen (Motrin, Advil) up to 600 mg 3 times daily if needed for additional pain control.  · You may take Tylenol 1000 mg every 6 hours as needed.  Do not take more than 4 g of Tylenol (acetominophen) in a day.    ACTIVITY:  · You may shower over wounds after 24 hours.  However, no soaking in the tub or swimming for 14 days.  · There are stitches that will need to come out in 7-10 days.  · Do not lift over 15 pounds with right arm for 1 week.  You may return to normal activities/light exercise when comfortable.   · You may walk, climb stairs or drive when able.      WHAT TO EXPECT:  · A small amount of bloody drainage on your dressings is normal.  · You may have some bruising or discoloration at your incision sites.     NOTIFY YOUR DOCTOR IF YOU HAVE THESE SYMPTOMS:  · Severe pain not relieved by your pain medication.  · Fever over 101°.  · Incision sites:  · Redness or warmth.  · Cloudy or foul smelling drainage.  · Bleeding or continuous oozing that saturates the dressing even after holding 10 minutes of pressure to the incision.    FOLLOW-UP APPOINTMENT:  DR. JULIENNE ROMAN  If you do not already have a follow-up appointment scheduled, please call to schedule an appointment 7-10 days after surgery at the following location:     · [facility]:  [phone]    [id number] [facility] [place]    If you have any problems or questions concerning your surgical procedure, please do not hesitate to call [phone].        
independent

## 2019-10-24 LAB
ANION GAP SERPL CALC-SCNC: 11 MMOL/L — SIGNIFICANT CHANGE UP (ref 5–17)
BUN SERPL-MCNC: 18 MG/DL — SIGNIFICANT CHANGE UP (ref 7–23)
CALCIUM SERPL-MCNC: 8.5 MG/DL — SIGNIFICANT CHANGE UP (ref 8.4–10.5)
CHLORIDE SERPL-SCNC: 102 MMOL/L — SIGNIFICANT CHANGE UP (ref 96–108)
CO2 SERPL-SCNC: 20 MMOL/L — LOW (ref 22–31)
CREAT SERPL-MCNC: 0.7 MG/DL — SIGNIFICANT CHANGE UP (ref 0.5–1.3)
GLUCOSE SERPL-MCNC: 162 MG/DL — HIGH (ref 70–99)
MAGNESIUM SERPL-MCNC: 2.2 MG/DL — SIGNIFICANT CHANGE UP (ref 1.6–2.6)
PHOSPHATE SERPL-MCNC: 2.9 MG/DL — SIGNIFICANT CHANGE UP (ref 2.5–4.5)
POTASSIUM SERPL-MCNC: 4.1 MMOL/L — SIGNIFICANT CHANGE UP (ref 3.5–5.3)
POTASSIUM SERPL-SCNC: 4.1 MMOL/L — SIGNIFICANT CHANGE UP (ref 3.5–5.3)
SODIUM SERPL-SCNC: 133 MMOL/L — LOW (ref 135–145)

## 2019-10-24 PROCEDURE — 99291 CRITICAL CARE FIRST HOUR: CPT

## 2019-10-24 PROCEDURE — 93886 INTRACRANIAL COMPLETE STUDY: CPT | Mod: 26

## 2019-10-24 RX ORDER — SODIUM CHLORIDE 9 MG/ML
500 INJECTION INTRAMUSCULAR; INTRAVENOUS; SUBCUTANEOUS ONCE
Refills: 0 | Status: COMPLETED | OUTPATIENT
Start: 2019-10-24 | End: 2019-10-24

## 2019-10-24 RX ADMIN — NIMODIPINE 30 MILLIGRAM(S): 60 SOLUTION ORAL at 14:03

## 2019-10-24 RX ADMIN — NIMODIPINE 30 MILLIGRAM(S): 60 SOLUTION ORAL at 23:53

## 2019-10-24 RX ADMIN — NIMODIPINE 30 MILLIGRAM(S): 60 SOLUTION ORAL at 16:18

## 2019-10-24 RX ADMIN — CHLORHEXIDINE GLUCONATE 1 APPLICATION(S): 213 SOLUTION TOPICAL at 22:13

## 2019-10-24 RX ADMIN — Medication 4 MILLIGRAM(S): at 00:07

## 2019-10-24 RX ADMIN — NIMODIPINE 30 MILLIGRAM(S): 60 SOLUTION ORAL at 00:07

## 2019-10-24 RX ADMIN — POLYETHYLENE GLYCOL 3350 17 GRAM(S): 17 POWDER, FOR SOLUTION ORAL at 12:06

## 2019-10-24 RX ADMIN — NIMODIPINE 30 MILLIGRAM(S): 60 SOLUTION ORAL at 12:05

## 2019-10-24 RX ADMIN — NIMODIPINE 30 MILLIGRAM(S): 60 SOLUTION ORAL at 22:13

## 2019-10-24 RX ADMIN — SODIUM CHLORIDE 1000 MILLILITER(S): 9 INJECTION INTRAMUSCULAR; INTRAVENOUS; SUBCUTANEOUS at 08:26

## 2019-10-24 RX ADMIN — NIMODIPINE 30 MILLIGRAM(S): 60 SOLUTION ORAL at 08:29

## 2019-10-24 RX ADMIN — Medication 4 MILLIGRAM(S): at 06:16

## 2019-10-24 RX ADMIN — FAMOTIDINE 20 MILLIGRAM(S): 10 INJECTION INTRAVENOUS at 06:16

## 2019-10-24 RX ADMIN — NIMODIPINE 30 MILLIGRAM(S): 60 SOLUTION ORAL at 20:24

## 2019-10-24 RX ADMIN — NIMODIPINE 30 MILLIGRAM(S): 60 SOLUTION ORAL at 18:03

## 2019-10-24 RX ADMIN — NIMODIPINE 30 MILLIGRAM(S): 60 SOLUTION ORAL at 10:14

## 2019-10-24 RX ADMIN — FAMOTIDINE 20 MILLIGRAM(S): 10 INJECTION INTRAVENOUS at 18:03

## 2019-10-24 RX ADMIN — ENOXAPARIN SODIUM 40 MILLIGRAM(S): 100 INJECTION SUBCUTANEOUS at 18:03

## 2019-10-24 RX ADMIN — Medication 325 MILLIGRAM(S): at 06:16

## 2019-10-24 RX ADMIN — Medication 4 MILLIGRAM(S): at 12:05

## 2019-10-24 RX ADMIN — Medication 4 MILLIGRAM(S): at 18:03

## 2019-10-24 NOTE — CHART NOTE - NSCHARTNOTEFT_GEN_A_CORE
Nutrition Follow Up Note  Patient seen for: Malnutrition follow up    Pt is a 55 yo M with no known PMH (has not seen physician in decades). Presented with HA and dizziness, initiating last Monday 10/14. 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     Source: RN, comprehensive chart review, interdisciplinary medical rounds, patient    Diet: Regular diet. No beef/no pork.     Per discussion with pt ____    Daily Weight in k.1 (10-23), Weight in k.8 (10-22)  % Weight Change: Wt stable.     Pertinent Medications: MEDICATIONS  (STANDING):  aspirin enteric coated 325 milliGRAM(s) Oral <User Schedule>  chlorhexidine 4% Liquid 1 Application(s) Topical <User Schedule>  dexamethasone  Injectable 4 milliGRAM(s) IV Push every 6 hours  enoxaparin Injectable 40 milliGRAM(s) SubCutaneous <User Schedule>  famotidine    Tablet 20 milliGRAM(s) Oral two times a day  influenza   Vaccine 0.5 milliLiter(s) IntraMuscular once  niMODipine 30 milliGRAM(s) Oral every 2 hours  polyethylene glycol 3350 17 Gram(s) Oral daily  senna 1 Tablet(s) Oral daily    MEDICATIONS  (PRN):  acetaminophen   Tablet .. 975 milliGRAM(s) Oral every 6 hours PRN Temp greater or equal to 38.5C (101.3F), Mild Pain (1 - 3)  oxyCODONE    IR 5 milliGRAM(s) Oral every 4 hours PRN Moderate Pain (4 - 6)  oxyCODONE    IR 10 milliGRAM(s) Oral every 4 hours PRN Severe Pain (7 - 10)    Pertinent Labs: 10-23 @ 21:34: Na 135, BUN 18, Cr 0.77, <H>, K+ 4.2, Phos 3.6, Mg 2.2, Alk Phos --, ALT/SGPT --, AST/SGOT --, HbA1c --    Finger Sticks:      Skin per nursing documentation: surgical incision s/p angio  Edema: no edema noted    Estimated Needs:   [x] no change since previous assessment  [ ] recalculated:     Previous Nutrition Diagnosis: Moderate protein-calorie malnutrition  Nutrition Diagnosis is: Remains appropriate, ongoing with PO diet.     New Nutrition Diagnosis:  Related to:    As evidenced by:      Interventions:     Recommend  1)    Monitoring and Evaluation:     Continue to monitor Nutritional intake, Tolerance to diet prescription, weights, labs, skin integrity    RD remains available upon request and will follow up per protocol Nutrition Follow Up Note  Patient seen for: Malnutrition follow up    Pt is a 55 yo M with no known PMH (has not seen physician in decades). Presented with HA and dizziness, initiating last Monday 10/14. 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     Source: RN, comprehensive chart review, interdisciplinary medical rounds, patient    Diet: Regular diet. No beef/no pork.     Per discussion with pt reports improvement of appetite since "procedure" (Pitts embolization). Pt endorsed completing most of meals served in-house and denies GI intolerance to PO diet. Denies N/V; no BM's recorded in-house at this time. Continues on bowel regimen as ordered. RD encouraged fiber intake (fruits, vegetables, whole wheat products) to promote bowel regularity. Pt expressed understanding. Reports utilizing daily menus.     Daily Weight in k.1 (10-23), Weight in k.8 (10-22)  % Weight Change: Wt stable.     Pertinent Medications: MEDICATIONS  (STANDING):  aspirin enteric coated 325 milliGRAM(s) Oral <User Schedule>  chlorhexidine 4% Liquid 1 Application(s) Topical <User Schedule>  dexamethasone  Injectable 4 milliGRAM(s) IV Push every 6 hours  enoxaparin Injectable 40 milliGRAM(s) SubCutaneous <User Schedule>  famotidine    Tablet 20 milliGRAM(s) Oral two times a day  influenza   Vaccine 0.5 milliLiter(s) IntraMuscular once  niMODipine 30 milliGRAM(s) Oral every 2 hours  polyethylene glycol 3350 17 Gram(s) Oral daily  senna 1 Tablet(s) Oral daily    MEDICATIONS  (PRN):  acetaminophen   Tablet .. 975 milliGRAM(s) Oral every 6 hours PRN Temp greater or equal to 38.5C (101.3F), Mild Pain (1 - 3)  oxyCODONE    IR 5 milliGRAM(s) Oral every 4 hours PRN Moderate Pain (4 - 6)  oxyCODONE    IR 10 milliGRAM(s) Oral every 4 hours PRN Severe Pain (7 - 10)    Pertinent Labs: 10-23 @ 21:34: Na 135, BUN 18, Cr 0.77, <H>, K+ 4.2, Phos 3.6, Mg 2.2, Alk Phos --, ALT/SGPT --, AST/SGOT --, HbA1c --    Finger Sticks:      Skin per nursing documentation: surgical incision s/p angio  Edema: no edema noted    Estimated Needs:   [x] no change since previous assessment  [ ] recalculated:     Previous Nutrition Diagnosis: Moderate protein-calorie malnutrition  Nutrition Diagnosis is: Remains appropriate (improving), ongoing with PO diet.     Interventions:     Recommend  1) Continue diet as ordered (regular, no beef/pork). If persistently elevated FSBG, consider adding consistent carbohydrate restriction. Defer consistency to medical team, SLP.   2) Encourage and monitor PO intake. Encourage use of daily menus to promote optimal PO intake potential.   3) Monitor wt trends, nutrition related labs, skin integrity, hydration status and bowel regularity.        Monitoring and Evaluation:     Continue to monitor Nutritional intake, Tolerance to diet prescription, weights, labs, skin integrity    RD remains available upon request and will follow up per protocol

## 2019-10-24 NOTE — PROGRESS NOTE ADULT - SUBJECTIVE AND OBJECTIVE BOX
SUMMARY: 54M with no known PMH but has not seen a physician in decades presents with HA and dizziness. Last Mon afternoon started with migrating HA and neck pain. Friday was doing some chores outside and around 10pm noted return of headache, mostly on the top of the head. The pain kept increasing overnight. Couldn't sleep. Pain was the worst he could recall in recent memory. Endorses nausea but no vomiting. Dizziness is positional and worse when he bends over or stands up quickly. Denies neck pain, chest pain, light sensitivity, chest pain, sob, abd pain, diarrhea, rash. Pt endorsed some chills at home but no fever. Denies recent trauma. Did fall and hit his head two years ago, and didn't seek treatment. 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     Overnight Events: Neurologically intact. Remains on DCI watch. Received a 500 cc bolus for euvolemia.     ROS: Negative unless specified.     VITALS:   T(C): 37.1 (10-24-19 @ 07:00), Max: 37.3 (10-23-19 @ 19:00)  HR: 65 (10-24-19 @ 07:00) (57 - 97)  BP: 129/78 (10-24-19 @ 07:00) (111/78 - 155/88)  RR: 26 (10-24-19 @ 07:00) (12 - 26)  SpO2: 95% (10-24-19 @ 07:00) (95% - 100%)    10-23-19 @ 07:01  -  10-24-19 @ 07:00  --------------------------------------------------------  IN: 1060 mL / OUT: 1750 mL / NET: -690 mL      LABS:                          15.1   14.36 )-----------( 181      ( 23 Oct 2019 21:34 )             43.3     10-23    135  |  102  |  18  ----------------------------<  149<H>  4.2   |  20<L>  |  0.77    Ca    8.9      23 Oct 2019 21:34  Phos  3.6     10-23  Mg     2.2     10-23        MEDS:  MEDICATIONS  (STANDING):  aspirin enteric coated 325 milliGRAM(s) Oral <User Schedule>  chlorhexidine 4% Liquid 1 Application(s) Topical <User Schedule>  dexamethasone  Injectable 4 milliGRAM(s) IV Push every 6 hours  enoxaparin Injectable 40 milliGRAM(s) SubCutaneous <User Schedule>  famotidine    Tablet 20 milliGRAM(s) Oral two times a day  influenza   Vaccine 0.5 milliLiter(s) IntraMuscular once  niMODipine 30 milliGRAM(s) Oral every 2 hours  polyethylene glycol 3350 17 Gram(s) Oral daily  senna 1 Tablet(s) Oral daily  sodium chloride 0.9% Bolus 500 milliLiter(s) IV Bolus once      EXAMINATION:  General:  calm  HEENT:  MMM. PERRL. EOMI.  Neuro:  awake, alert, oriented x 3, b/l UE - 5/5. b/l LE - 5/5.  Cards:  RRR  Respiratory:  CTAB.   Abdomen:  soft  Extremities:  no edema  Skin:  warm/dry. Vitiligo most prominent around neck and arms/hands SUMMARY: 54M with no known PMH but has not seen a physician in decades presents with HA and dizziness. Last Mon afternoon started with migrating HA and neck pain. Friday was doing some chores outside and around 10pm noted return of headache, mostly on the top of the head. The pain kept increasing overnight. Couldn't sleep. Pain was the worst he could recall in recent memory. Endorses nausea but no vomiting. Dizziness is positional and worse when he bends over or stands up quickly. Denies neck pain, chest pain, light sensitivity, chest pain, sob, abd pain, diarrhea, rash. Pt endorsed some chills at home but no fever. Denies recent trauma. Did fall and hit his head two years ago, and didn't seek treatment. 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     Overnight Events: Neurologically intact. Remains on DCI watch. Received a 500 cc bolus for euvolemia.     ROS: Negative unless specified.     VITALS:   T(C): 37.1 (10-24-19 @ 07:00), Max: 37.3 (10-23-19 @ 19:00)  HR: 65 (10-24-19 @ 07:00) (57 - 97)  BP: 129/78 (10-24-19 @ 07:00) (111/78 - 155/88)  RR: 26 (10-24-19 @ 07:00) (12 - 26)  SpO2: 95% (10-24-19 @ 07:00) (95% - 100%)    10-23-19 @ 07:01  -  10-24-19 @ 07:00  --------------------------------------------------------  IN: 1060 mL / OUT: 1750 mL / NET: -690 mL      LABS:                          15.1   14.36 )-----------( 181      ( 23 Oct 2019 21:34 )             43.3     10-23    135  |  102  |  18  ----------------------------<  149<H>  4.2   |  20<L>  |  0.77    Ca    8.9      23 Oct 2019 21:34  Phos  3.6     10-23  Mg     2.2     10-23        MEDS:  MEDICATIONS  (STANDING):  aspirin enteric coated 325 milliGRAM(s) Oral <User Schedule>  chlorhexidine 4% Liquid 1 Application(s) Topical <User Schedule>  dexamethasone  Injectable 4 milliGRAM(s) IV Push every 6 hours  enoxaparin Injectable 40 milliGRAM(s) SubCutaneous <User Schedule>  famotidine    Tablet 20 milliGRAM(s) Oral two times a day  influenza   Vaccine 0.5 milliLiter(s) IntraMuscular once  niMODipine 30 milliGRAM(s) Oral every 2 hours  polyethylene glycol 3350 17 Gram(s) Oral daily  senna 1 Tablet(s) Oral daily  sodium chloride 0.9% Bolus 500 milliLiter(s) IV Bolus once      EXAMINATION:  General:  calm  HEENT:  MMM. EOMI.  Neuro:  awake, alert, oriented x 3, b/l UE - 5/5. b/l LE - 5/5.  Cards:  RRR  Respiratory:  CTAB.   Abdomen:  soft  Extremities:  no edema  Skin:  warm/dry. Vitiligo most prominent around neck and arms/hands SUMMARY: 54M with no known PMH but has not seen a physician in decades presents with HA and dizziness. Last Mon afternoon started with migrating HA and neck pain. Friday was doing some chores outside and around 10pm noted return of headache, mostly on the top of the head. The pain kept increasing overnight. Couldn't sleep. Pain was the worst he could recall in recent memory. Endorses nausea but no vomiting. Dizziness is positional and worse when he bends over or stands up quickly. Denies neck pain, chest pain, light sensitivity, chest pain, sob, abd pain, diarrhea, rash. Pt endorsed some chills at home but no fever. Denies recent trauma. Did fall and hit his head two years ago, and didn't seek treatment. 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     Overnight Events: Neurologically intact. Remains on DCI watch. Received a 500 cc bolus for euvolemia.     ROS: Negative unless specified.     VITALS:   T(C): 37.1 (10-24-19 @ 07:00), Max: 37.3 (10-23-19 @ 19:00)  HR: 65 (10-24-19 @ 07:00) (57 - 97)  BP: 129/78 (10-24-19 @ 07:00) (111/78 - 155/88)  RR: 26 (10-24-19 @ 07:00) (12 - 26)  SpO2: 95% (10-24-19 @ 07:00) (95% - 100%)    10-23-19 @ 07:01  -  10-24-19 @ 07:00  --------------------------------------------------------  IN: 1060 mL / OUT: 1750 mL / NET: -690 mL      LABS:                          15.1   14.36 )-----------( 181      ( 23 Oct 2019 21:34 )             43.3     10-23    135  |  102  |  18  ----------------------------<  149<H>  4.2   |  20<L>  |  0.77    Ca    8.9      23 Oct 2019 21:34  Phos  3.6     10-23  Mg     2.2     10-23        MEDS:  MEDICATIONS  (STANDING):  aspirin enteric coated 325 milliGRAM(s) Oral <User Schedule>  chlorhexidine 4% Liquid 1 Application(s) Topical <User Schedule>  dexamethasone  Injectable 4 milliGRAM(s) IV Push every 6 hours  enoxaparin Injectable 40 milliGRAM(s) SubCutaneous <User Schedule>  famotidine    Tablet 20 milliGRAM(s) Oral two times a day  influenza   Vaccine 0.5 milliLiter(s) IntraMuscular once  niMODipine 30 milliGRAM(s) Oral every 2 hours  polyethylene glycol 3350 17 Gram(s) Oral daily  senna 1 Tablet(s) Oral daily  sodium chloride 0.9% Bolus 500 milliLiter(s) IV Bolus once.      EXAMINATION:  General:  calm  HEENT:  MMM. EOMI.  Neuro:  awake, alert, oriented x 3, b/l UE - 5/5. b/l LE - 5/5.  Cards:  RRR  Respiratory:  CTAB.   Abdomen:  soft  Extremities:  no edema  Skin:  warm/dry. Vitiligo most prominent around neck and arms/hands

## 2019-10-24 NOTE — CHART NOTE - NSCHARTNOTEFT_GEN_A_CORE
Transcranial doppler exam #1 (10/24/19) 12pm  mean velocity  cm/sec                              Left             Right  MELISSA                 29(technical)    69  MCA                56                  57  PCA                  23,22            30,28  VERT                  30                34  BA                                39    Official report to follow.  Lelo

## 2019-10-24 NOTE — PROGRESS NOTE ADULT - SUBJECTIVE AND OBJECTIVE BOX
NSCU ATTENDING -- ADDITIONAL PROGRESS NOTE    Nighttime rounds were performed -- please refer to earlier Progress Note for HPI details.    T(C): 36.9 (10-24-19 @ 19:00), Max: 37.3 (10-24-19 @ 11:00)  HR: 89 (10-24-19 @ 21:00) (57 - 96)  BP: 131/88 (10-24-19 @ 21:00) (111/78 - 150/90)  RR: 20 (10-24-19 @ 21:00) (12 - 26)  SpO2: 95% (10-24-19 @ 21:00) (95% - 99%)  Wt(kg): --    Relevant labwork and imaging reviewed.    Neuro intact.  OOB.  No drains. NSCU ATTENDING -- ADDITIONAL PROGRESS NOTE    Nighttime rounds were performed -- please refer to earlier Progress Note for HPI details.    T(C): 36.9 (10-24-19 @ 19:00), Max: 37.3 (10-24-19 @ 11:00)  HR: 89 (10-24-19 @ 21:00) (57 - 96)  BP: 131/88 (10-24-19 @ 21:00) (111/78 - 150/90)  RR: 20 (10-24-19 @ 21:00) (12 - 26)  SpO2: 95% (10-24-19 @ 21:00) (95% - 99%)  Wt(kg): --    Relevant labwork and imaging reviewed.    Neuro intact., TCDs unremarkable today  OOB.  No drains.  CBC holiday, follow CMP.  Make q2h neuro checks for some semblance of normal sleep.

## 2019-10-24 NOTE — PROGRESS NOTE ADULT - ASSESSMENT
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 No segmental wall motion abnormalities    RENAL:  Fluids: IVL    GI:  Diet: Regular diet  GI prophylaxis - Famotidine 20 BID (while on steroids)  Bowel regimen [X] colace [X] senna [] other:  Last BM: none during hospitalization    ENDO:   Goal euglycemia (-180)    HEME/ONC:  VTE prophylaxis: [X] SCDs [] chemoprophylaxis [X] on Lovenox 40 sc qHS    ID: Monitor for fevers    MISC:    SOCIAL/FAMILY:  [] awaiting [X] updated at bedside [] family meeting    CODE STATUS:  [X] Full Code [] DNR [] DNI [] Palliative/Comfort Care    DISPOSITION:  [X] ICU [] Stroke Unit [] Floor [] EMU [] RCU [] PCU.    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 No segmental wall motion abnormalities    RENAL:  Fluids: IVL    GI:  Diet: Regular diet  GI prophylaxis - Famotidine 20 BID (while on steroids)  Bowel regimen  [X] senna [X] Miralax  Last BM: none during hospitalization    ENDO:   Goal euglycemia (-180)    HEME/ONC:  VTE prophylaxis: [X] SCDs [] chemoprophylaxis [X] on Lovenox 40 sc qHS    ID: Monitor for fevers    MISC:    SOCIAL/FAMILY:  [] awaiting [X] updated at bedside [] family meeting    CODE STATUS:  [X] Full Code [] DNR [] DNI [] Palliative/Comfort Care    DISPOSITION:  [X] ICU [] Stroke Unit [] Floor [] EMU [] RCU [] PCU.    [X] Patient is at high risk of neurologic deterioration/death due to: Re-rupture, Delayed Cerebral Ischemia

## 2019-10-25 DIAGNOSIS — E87.1 HYPO-OSMOLALITY AND HYPONATREMIA: ICD-10-CM

## 2019-10-25 DIAGNOSIS — D72.829 ELEVATED WHITE BLOOD CELL COUNT, UNSPECIFIED: ICD-10-CM

## 2019-10-25 DIAGNOSIS — I60.9 NONTRAUMATIC SUBARACHNOID HEMORRHAGE, UNSPECIFIED: ICD-10-CM

## 2019-10-25 DIAGNOSIS — I10 ESSENTIAL (PRIMARY) HYPERTENSION: ICD-10-CM

## 2019-10-25 LAB
ANION GAP SERPL CALC-SCNC: 11 MMOL/L — SIGNIFICANT CHANGE UP (ref 5–17)
BUN SERPL-MCNC: 21 MG/DL — SIGNIFICANT CHANGE UP (ref 7–23)
CALCIUM SERPL-MCNC: 8.5 MG/DL — SIGNIFICANT CHANGE UP (ref 8.4–10.5)
CHLORIDE SERPL-SCNC: 100 MMOL/L — SIGNIFICANT CHANGE UP (ref 96–108)
CO2 SERPL-SCNC: 21 MMOL/L — LOW (ref 22–31)
CREAT SERPL-MCNC: 0.72 MG/DL — SIGNIFICANT CHANGE UP (ref 0.5–1.3)
GLUCOSE SERPL-MCNC: 114 MG/DL — HIGH (ref 70–99)
MAGNESIUM SERPL-MCNC: 2.2 MG/DL — SIGNIFICANT CHANGE UP (ref 1.6–2.6)
PHOSPHATE SERPL-MCNC: 3.4 MG/DL — SIGNIFICANT CHANGE UP (ref 2.5–4.5)
POTASSIUM SERPL-MCNC: 4.3 MMOL/L — SIGNIFICANT CHANGE UP (ref 3.5–5.3)
POTASSIUM SERPL-SCNC: 4.3 MMOL/L — SIGNIFICANT CHANGE UP (ref 3.5–5.3)
SODIUM SERPL-SCNC: 132 MMOL/L — LOW (ref 135–145)

## 2019-10-25 PROCEDURE — 93888 INTRACRANIAL LIMITED STUDY: CPT | Mod: 26

## 2019-10-25 PROCEDURE — 99233 SBSQ HOSP IP/OBS HIGH 50: CPT

## 2019-10-25 PROCEDURE — 99223 1ST HOSP IP/OBS HIGH 75: CPT

## 2019-10-25 RX ORDER — SODIUM CHLORIDE 9 MG/ML
1000 INJECTION INTRAMUSCULAR; INTRAVENOUS; SUBCUTANEOUS ONCE
Refills: 0 | Status: COMPLETED | OUTPATIENT
Start: 2019-10-25 | End: 2019-10-25

## 2019-10-25 RX ORDER — SODIUM CHLORIDE 5 G/100ML
1000 INJECTION, SOLUTION INTRAVENOUS
Refills: 0 | Status: DISCONTINUED | OUTPATIENT
Start: 2019-10-25 | End: 2019-10-28

## 2019-10-25 RX ADMIN — ENOXAPARIN SODIUM 40 MILLIGRAM(S): 100 INJECTION SUBCUTANEOUS at 18:19

## 2019-10-25 RX ADMIN — NIMODIPINE 30 MILLIGRAM(S): 60 SOLUTION ORAL at 18:19

## 2019-10-25 RX ADMIN — NIMODIPINE 30 MILLIGRAM(S): 60 SOLUTION ORAL at 14:04

## 2019-10-25 RX ADMIN — NIMODIPINE 30 MILLIGRAM(S): 60 SOLUTION ORAL at 22:19

## 2019-10-25 RX ADMIN — SODIUM CHLORIDE 1000 MILLILITER(S): 9 INJECTION INTRAMUSCULAR; INTRAVENOUS; SUBCUTANEOUS at 12:30

## 2019-10-25 RX ADMIN — FAMOTIDINE 20 MILLIGRAM(S): 10 INJECTION INTRAVENOUS at 06:02

## 2019-10-25 RX ADMIN — Medication 4 MILLIGRAM(S): at 00:08

## 2019-10-25 RX ADMIN — NIMODIPINE 30 MILLIGRAM(S): 60 SOLUTION ORAL at 08:08

## 2019-10-25 RX ADMIN — NIMODIPINE 30 MILLIGRAM(S): 60 SOLUTION ORAL at 23:45

## 2019-10-25 RX ADMIN — SODIUM CHLORIDE 50 MILLILITER(S): 5 INJECTION, SOLUTION INTRAVENOUS at 18:31

## 2019-10-25 RX ADMIN — OXYCODONE HYDROCHLORIDE 10 MILLIGRAM(S): 5 TABLET ORAL at 10:45

## 2019-10-25 RX ADMIN — Medication 325 MILLIGRAM(S): at 06:01

## 2019-10-25 RX ADMIN — OXYCODONE HYDROCHLORIDE 10 MILLIGRAM(S): 5 TABLET ORAL at 10:15

## 2019-10-25 RX ADMIN — NIMODIPINE 30 MILLIGRAM(S): 60 SOLUTION ORAL at 10:12

## 2019-10-25 RX ADMIN — NIMODIPINE 30 MILLIGRAM(S): 60 SOLUTION ORAL at 20:21

## 2019-10-25 NOTE — CONSULT NOTE ADULT - PROBLEM SELECTOR RECOMMENDATION 9
- neuro checks q4h  - off keppra  - per NSG, HH1 MF1  - cont nimodipine as tolerated. Ok to give for HR >50 as long as asymptomatic.  - pt is on  for web. pt is also P2y12 responder.  - pharmacologic DVT ppx as per NSG, currently on lovenox 40

## 2019-10-25 NOTE — CHART NOTE - NSCHARTNOTEFT_GEN_A_CORE
Transcranial doppler exam #1 (10/24/19) 12pm  mean velocity  cm/sec                              Left             Right  MELISSA                 29(technical)    69  MCA                56                  57  PCA                  23,22            30,28  VERT                  30                34  BA                                39    Official report to follow.  Lelo    Transcranial doppler exam #2 (10/25/19) 1030am  mean velocity  cm/sec                              Left         Right  MELISSA                    36           59  MCA                    53           51  PCA                    28,19       25,24  Official report to follow.  Lelo

## 2019-10-25 NOTE — CONSULT NOTE ADULT - PROBLEM SELECTOR RECOMMENDATION 3
- very mild  - will check urine lytes, serum tsh/osm  - if Na 140 is goal, then pt has 337 na meq deficit  - if Na 135 is goal, then pt has 126 na meq deficit  - awaiting result of urine osm/na to determine optimal rate of 2% IVF.  - would also consider free water restriction or salt tabs as alternative to hypertonic saline - very mild  - will check urine lytes, serum tsh/osm  - if Na 140 is goal, then pt has 337 na meq deficit  - if Na 135 is goal, then pt has 126 na meq deficit  - awaiting result of urine osm/na to determine optimal rate of 2% IVF.  - would also consider free water restriction or salt tabs as alternative to hypertonic saline  - if on hypertonic saline, would check bmp more frequently at least q12h to avoid over rapid correction - very mild  - will check urine lytes, serum tsh/osm  - if Na 140 is goal, then pt has 337 na meq deficit  - if Na 135 is goal, then pt has 126 na meq deficit  - urine osm/na would help determine optimal rate of 2% IVF, but 50cc/hr of 2% is reasonable at this time, which delivers 342 meq Na in 20 hours.  - would also consider free water restriction or salt tabs as alternative to hypertonic saline  - if on hypertonic saline, would check bmp more frequently at least q12h to avoid over rapid correction - very mild  - poss cerebral salt wasting vs other etiol  - will check urine lytes, serum tsh/osm  - if Na 140 is goal, then pt has 337 na meq deficit  - if Na 135 is goal, then pt has 126 na meq deficit  - urine osm/na would help determine optimal rate of 2% IVF, but 50cc/hr of 2% is reasonable at this time, which delivers 342 meq Na in 20 hours.  - would also consider free water restriction or salt tabs as alternative to hypertonic saline  - if on hypertonic saline, would check bmp more frequently at least q12h to avoid over rapid correction

## 2019-10-25 NOTE — PROGRESS NOTE ADULT - ASSESSMENT
54M HH1 MF1 SAH s/p GRANGER of R Acomm   - Liberalize neurochecks to q2h  - TCD stable  - euvolemia  - nimodipine  - pain control

## 2019-10-25 NOTE — PROGRESS NOTE ADULT - SUBJECTIVE AND OBJECTIVE BOX
SUMMARY: 54M with no known PMH but has not seen a physician in decades presents with HA and dizziness. Last Mon afternoon started with migrating HA and neck pain. Friday was doing some chores outside and around 10pm noted return of headache, mostly on the top of the head. The pain kept increasing overnight. Couldn't sleep. Pain was the worst he could recall in recent memory. Endorses nausea but no vomiting. Dizziness is positional and worse when he bends over or stands up quickly. Denies neck pain, chest pain, light sensitivity, chest pain, sob, abd pain, diarrhea, rash. Pt endorsed some chills at home but no fever. Denies recent trauma. Did fall and hit his head two years ago, and didn't seek treatment. 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     Overnight Events:  Ambulating in the unit.     ROS: Negative unless specified    VITALS:   T(C): 37 (10-25-19 @ 07:00), Max: 37.3 (10-24-19 @ 11:00)  HR: 70 (10-25-19 @ 08:08) (55 - 89)  BP: 142/100 (10-25-19 @ 08:08) (123/90 - 150/90)  RR: 17 (10-25-19 @ 08:08) (12 - 21)  SpO2: 100% (10-25-19 @ 07:00) (95% - 100%)    10-24-19 @ 07:01  -  10-25-19 @ 07:00  --------------------------------------------------------  IN: 1625 mL / OUT: 2150 mL / NET: -525 mL      LABS:                          15.1   14.36 )-----------( 181      ( 23 Oct 2019 21:34 )             43.3     10-24    133<L>  |  102  |  18  ----------------------------<  162<H>  4.1   |  20<L>  |  0.70    Ca    8.5      24 Oct 2019 22:13  Phos  2.9     10-24  Mg     2.2     10-24        MEDS:  MEDICATIONS  (STANDING):  aspirin enteric coated 325 milliGRAM(s) Oral <User Schedule>  chlorhexidine 4% Liquid 1 Application(s) Topical <User Schedule>  enoxaparin Injectable 40 milliGRAM(s) SubCutaneous <User Schedule>  famotidine    Tablet 20 milliGRAM(s) Oral two times a day  influenza   Vaccine 0.5 milliLiter(s) IntraMuscular once  niMODipine 30 milliGRAM(s) Oral every 2 hours  polyethylene glycol 3350 17 Gram(s) Oral daily  senna 1 Tablet(s) Oral daily      EXAMINATION:  General:  calm  HEENT:  MMM. EOMI.  Neuro:  awake, alert, oriented x 3, b/l UE - 5/5. b/l LE - 5/5.  Cards:  RRR  Respiratory:  CTAB.   Abdomen:  soft  Extremities:  no edema  Skin:  warm/dry. Vitiligo most prominent around neck and arms/hands SUMMARY: 54M with no known PMH but has not seen a physician in decades presents with HA and dizziness. Last Mon afternoon started with migrating HA and neck pain. Friday was doing some chores outside and around 10pm noted return of headache, mostly on the top of the head. The pain kept increasing overnight. Couldn't sleep. Pain was the worst he could recall in recent memory. Endorses nausea but no vomiting. Dizziness is positional and worse when he bends over or stands up quickly. Denies neck pain, chest pain, light sensitivity, chest pain, sob, abd pain, diarrhea, rash. Pt endorsed some chills at home but no fever. Denies recent trauma. Did fall and hit his head two years ago, and didn't seek treatment. 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     Overnight Events:  hyponatremia     REVIEW OF SYSTEMS: [ ] Unable to Assess due to neurologic exam   [x ] All ROS addressed below are non-contributory, except:  Neuro: [ ] Headache [ ] Back pain [ ] Numbness [ ] Weakness [ ] Ataxia [ ] Dizziness [ ] Aphasia [ ] Dysarthria [ ] Visual disturbance  Resp: [ ] Shortness of breath/dyspnea, [ ] Orthopnea [ ] Cough  CV: [ ] Chest pain [ ] Palpitation [ ] Lightheadedness [ ] Syncope  Renal: [ ] Thirst [ ] Edema  GI: [ ] Nausea [ ] Emesis [ ] Abdominal pain [ ] Constipation [ ] Diarrhea  Hem: [ ] Hematemesis [ ] bright red blood per rectum  ID: [ ] Fever [ ] Chills [ ] Dysuria  ENT: [ ] Rhinorrhea          T(C): 37 (10-25-19 @ 07:00), Max: 37 (10-25-19 @ 07:00)  HR: 84 (10-25-19 @ 10:00) (55 - 89)  BP: 134/92 (10-25-19 @ 10:00) (123/90 - 150/90)  RR: 15 (10-25-19 @ 10:00) (12 - 31)  SpO2: 100% (10-25-19 @ 10:00) (95% - 100%)  10-24-19 @ 07:01  -  10-25-19 @ 07:00  --------------------------------------------------------  IN: 1625 mL / OUT: 2150 mL / NET: -525 mL    acetaminophen   Tablet .. 975 milliGRAM(s) Oral every 6 hours PRN  aspirin enteric coated 325 milliGRAM(s) Oral <User Schedule>  chlorhexidine 4% Liquid 1 Application(s) Topical <User Schedule>  enoxaparin Injectable 40 milliGRAM(s) SubCutaneous <User Schedule>  famotidine    Tablet 20 milliGRAM(s) Oral two times a day  influenza   Vaccine 0.5 milliLiter(s) IntraMuscular once  niMODipine 30 milliGRAM(s) Oral every 2 hours  oxyCODONE    IR 5 milliGRAM(s) Oral every 4 hours PRN  oxyCODONE    IR 10 milliGRAM(s) Oral every 4 hours PRN  polyethylene glycol 3350 17 Gram(s) Oral daily  senna 1 Tablet(s) Oral daily        EXAMINATION:  General:  calm  HEENT:  MMM. EOMI.  Neuro:  awake, alert, oriented x 3, b/l UE - 5/5. b/l LE - 5/5.  Cards:  RRR  Respiratory:  CTAB.   Abdomen:  soft  Extremities:  no edema  Skin:  warm/dry. Vitiligo most prominent around neck and arms/hands       LABS:  Na: 133 (10-24 @ 22:13), 135 (10-23 @ 21:34), 135 (10-22 @ 23:30)  K: 4.1 (10-24 @ 22:13), 4.2 (10-23 @ 21:34), 4.0 (10-22 @ 23:30)  Cl: 102 (10-24 @ 22:13), 102 (10-23 @ 21:34), 107 (10-22 @ 23:30)  CO2: 20 (10-24 @ 22:13), 20 (10-23 @ 21:34), 18 (10-22 @ 23:30)  BUN: 18 (10-24 @ 22:13), 18 (10-23 @ 21:34), 14 (10-22 @ 23:30)  Cr: 0.70 (10-24 @ 22:13), 0.77 (10-23 @ 21:34), 0.74 (10-22 @ 23:30)  Glu: 162(10-24 @ 22:13), 149(10-23 @ 21:34), 169(10-22 @ 23:30)    Hgb: 15.1 (10-23 @ 21:34), 14.9 (10-22 @ 23:30)  Hct: 43.3 (10-23 @ 21:34), 41.4 (10-22 @ 23:30)  WBC: 14.36 (10-23 @ 21:34), 13.60 (10-22 @ 23:30)  Plt: 181 (10-23 @ 21:34), 177 (10-22 @ 23:30)    INR:   PTT:

## 2019-10-25 NOTE — PROGRESS NOTE ADULT - ASSESSMENT
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 No segmental wall motion abnormalities    RENAL:  Fluids: IVL    GI:  Diet: Regular diet  GI prophylaxis - Famotidine 20 BID (while on steroids)  Bowel regimen  [X] senna [X] Miralax  Last BM: none during hospitalization    ENDO:   Goal euglycemia (-180)    HEME/ONC:  VTE prophylaxis: [X] SCDs [] chemoprophylaxis [X] on Lovenox 40 sc qHS    ID: Monitor for fevers    MISC:    SOCIAL/FAMILY:  [] awaiting [X] updated at bedside [] family meeting    CODE STATUS:  [X] Full Code [] DNR [] DNI [] Palliative/Comfort Care    DISPOSITION:  [X] ICU [] Stroke Unit [] Floor [] EMU [] RCU [] PCU.    [X] Patient is at high risk of neurologic deterioration/death due to: Re-rupture, Delayed Cerebral Ischemia Summary: 55 yo male HH1 mF1 SAH 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)  likely PBD 10 given that he had headache days before admission, CT head didn't show acute SAH     NEURO: neuro checks q 4 hr  MRI - No residual aneurysm  d/c decadron(WAS GIVEN FOR WEB)   on  mg po daily for Pitts  Delayed Cerebral Ischemia Management: Serial screening TCDs, euvolemia,  Nimodipine 30 q2H (since patient is bradycardic)  TCD has been nl, will repeat TCD today   Pain: PRN Oxy  Activity: [X] OOB as tolerated [] Bedrest [X] PT [X] OT [] PMNR    PULM: SpO2 > 92%  Incentive spirometry    CV:  SBP goal 100 - 180 mmhg   TTE - Nl systolic function. No segmental wall motion abnormalities    RENAL:  Fluids: IVL  Bolus as needed to target euvolemia     GI:  Diet: Regular diet  GI prophylaxis - d/c famotidine   Bowel regimen  [X] senna [X] Miralax  Last BM: n10/23/19    ENDO:   Goal euglycemia (-180)    HEME/ONC:  VTE prophylaxis: [X] SCDs [] chemoprophylaxis [X] on Lovenox 40 sc qHS    ID: Monitor for fevers    MISC:    SOCIAL/FAMILY:  [] awaiting [X] updated at bedside [] family meeting    CODE STATUS:  [X] Full Code [] DNR [] DNI [] Palliative/Comfort Care    DISPOSITION:  [] ICU [] Stroke Unit [x] Floor [] EMU [] RCU [] PCU.

## 2019-10-25 NOTE — PROGRESS NOTE ADULT - SUBJECTIVE AND OBJECTIVE BOX
Patient seen and examined at bedside.    --Anticoagulation--  aspirin enteric coated 325 milliGRAM(s) Oral <User Schedule>  enoxaparin Injectable 40 milliGRAM(s) SubCutaneous <User Schedule>    T(C): 36.9 (10-24-19 @ 23:00), Max: 37.3 (10-24-19 @ 11:00)  HR: 63 (10-25-19 @ 00:00) (57 - 96)  BP: 123/90 (10-25-19 @ 00:00) (111/78 - 150/90)  RR: 18 (10-25-19 @ 00:00) (12 - 26)  SpO2: 95% (10-25-19 @ 00:00) (95% - 99%)  Wt(kg): --    Exam:  AOx3, FC, [place] strongly ag without drift

## 2019-10-26 LAB
ANION GAP SERPL CALC-SCNC: 12 MMOL/L — SIGNIFICANT CHANGE UP (ref 5–17)
ANION GAP SERPL CALC-SCNC: 13 MMOL/L — SIGNIFICANT CHANGE UP (ref 5–17)
ANION GAP SERPL CALC-SCNC: 14 MMOL/L — SIGNIFICANT CHANGE UP (ref 5–17)
ANION GAP SERPL CALC-SCNC: 8 MMOL/L — SIGNIFICANT CHANGE UP (ref 5–17)
APPEARANCE UR: CLEAR — SIGNIFICANT CHANGE UP
BILIRUB UR-MCNC: NEGATIVE — SIGNIFICANT CHANGE UP
BUN SERPL-MCNC: 18 MG/DL — SIGNIFICANT CHANGE UP (ref 7–23)
BUN SERPL-MCNC: 19 MG/DL — SIGNIFICANT CHANGE UP (ref 7–23)
BUN SERPL-MCNC: 20 MG/DL — SIGNIFICANT CHANGE UP (ref 7–23)
BUN SERPL-MCNC: 22 MG/DL — SIGNIFICANT CHANGE UP (ref 7–23)
CALCIUM SERPL-MCNC: 8.1 MG/DL — LOW (ref 8.4–10.5)
CALCIUM SERPL-MCNC: 8.4 MG/DL — SIGNIFICANT CHANGE UP (ref 8.4–10.5)
CALCIUM SERPL-MCNC: 8.5 MG/DL — SIGNIFICANT CHANGE UP (ref 8.4–10.5)
CALCIUM SERPL-MCNC: 8.6 MG/DL — SIGNIFICANT CHANGE UP (ref 8.4–10.5)
CHLORIDE SERPL-SCNC: 100 MMOL/L — SIGNIFICANT CHANGE UP (ref 96–108)
CHLORIDE SERPL-SCNC: 103 MMOL/L — SIGNIFICANT CHANGE UP (ref 96–108)
CO2 SERPL-SCNC: 21 MMOL/L — LOW (ref 22–31)
CO2 SERPL-SCNC: 22 MMOL/L — SIGNIFICANT CHANGE UP (ref 22–31)
COLOR SPEC: SIGNIFICANT CHANGE UP
CREAT ?TM UR-MCNC: 54 MG/DL — SIGNIFICANT CHANGE UP
CREAT SERPL-MCNC: 0.72 MG/DL — SIGNIFICANT CHANGE UP (ref 0.5–1.3)
CREAT SERPL-MCNC: 0.75 MG/DL — SIGNIFICANT CHANGE UP (ref 0.5–1.3)
CREAT SERPL-MCNC: 0.77 MG/DL — SIGNIFICANT CHANGE UP (ref 0.5–1.3)
CREAT SERPL-MCNC: 0.82 MG/DL — SIGNIFICANT CHANGE UP (ref 0.5–1.3)
DIFF PNL FLD: NEGATIVE — SIGNIFICANT CHANGE UP
GLUCOSE SERPL-MCNC: 109 MG/DL — HIGH (ref 70–99)
GLUCOSE SERPL-MCNC: 115 MG/DL — HIGH (ref 70–99)
GLUCOSE SERPL-MCNC: 124 MG/DL — HIGH (ref 70–99)
GLUCOSE SERPL-MCNC: 134 MG/DL — HIGH (ref 70–99)
GLUCOSE UR QL: NEGATIVE — SIGNIFICANT CHANGE UP
HCT VFR BLD CALC: 45.3 % — SIGNIFICANT CHANGE UP (ref 39–50)
HGB BLD-MCNC: 16 G/DL — SIGNIFICANT CHANGE UP (ref 13–17)
KETONES UR-MCNC: NEGATIVE — SIGNIFICANT CHANGE UP
LEUKOCYTE ESTERASE UR-ACNC: NEGATIVE — SIGNIFICANT CHANGE UP
MCHC RBC-ENTMCNC: 29.9 PG — SIGNIFICANT CHANGE UP (ref 27–34)
MCHC RBC-ENTMCNC: 35.3 GM/DL — SIGNIFICANT CHANGE UP (ref 32–36)
MCV RBC AUTO: 84.5 FL — SIGNIFICANT CHANGE UP (ref 80–100)
NITRITE UR-MCNC: NEGATIVE — SIGNIFICANT CHANGE UP
NRBC # BLD: 0 /100 WBCS — SIGNIFICANT CHANGE UP (ref 0–0)
OSMOLALITY SERPL: 285 MOSMOL/KG — SIGNIFICANT CHANGE UP (ref 275–300)
OSMOLALITY UR: 450 MOS/KG — SIGNIFICANT CHANGE UP (ref 300–900)
PH UR: 6.5 — SIGNIFICANT CHANGE UP (ref 5–8)
PLATELET # BLD AUTO: 187 K/UL — SIGNIFICANT CHANGE UP (ref 150–400)
POTASSIUM SERPL-MCNC: 3.6 MMOL/L — SIGNIFICANT CHANGE UP (ref 3.5–5.3)
POTASSIUM SERPL-MCNC: 3.8 MMOL/L — SIGNIFICANT CHANGE UP (ref 3.5–5.3)
POTASSIUM SERPL-MCNC: 4.3 MMOL/L — SIGNIFICANT CHANGE UP (ref 3.5–5.3)
POTASSIUM SERPL-MCNC: 4.6 MMOL/L — SIGNIFICANT CHANGE UP (ref 3.5–5.3)
POTASSIUM SERPL-SCNC: 3.6 MMOL/L — SIGNIFICANT CHANGE UP (ref 3.5–5.3)
POTASSIUM SERPL-SCNC: 3.8 MMOL/L — SIGNIFICANT CHANGE UP (ref 3.5–5.3)
POTASSIUM SERPL-SCNC: 4.3 MMOL/L — SIGNIFICANT CHANGE UP (ref 3.5–5.3)
POTASSIUM SERPL-SCNC: 4.6 MMOL/L — SIGNIFICANT CHANGE UP (ref 3.5–5.3)
PROT ?TM UR-MCNC: 5 MG/DL — SIGNIFICANT CHANGE UP (ref 0–12)
PROT UR-MCNC: NEGATIVE — SIGNIFICANT CHANGE UP
RBC # BLD: 5.36 M/UL — SIGNIFICANT CHANGE UP (ref 4.2–5.8)
RBC # FLD: 12.4 % — SIGNIFICANT CHANGE UP (ref 10.3–14.5)
SODIUM SERPL-SCNC: 130 MMOL/L — LOW (ref 135–145)
SODIUM SERPL-SCNC: 134 MMOL/L — LOW (ref 135–145)
SODIUM SERPL-SCNC: 135 MMOL/L — SIGNIFICANT CHANGE UP (ref 135–145)
SODIUM SERPL-SCNC: 136 MMOL/L — SIGNIFICANT CHANGE UP (ref 135–145)
SODIUM UR-SCNC: 100 MMOL/L — SIGNIFICANT CHANGE UP
SP GR SPEC: 1.01 — SIGNIFICANT CHANGE UP (ref 1.01–1.02)
TSH SERPL-MCNC: 0.62 UIU/ML — SIGNIFICANT CHANGE UP (ref 0.27–4.2)
URATE SERPL-MCNC: 3.6 MG/DL — SIGNIFICANT CHANGE UP (ref 3.4–8.8)
URATE UR-MCNC: 22.9 MG/DL — SIGNIFICANT CHANGE UP
UROBILINOGEN FLD QL: SIGNIFICANT CHANGE UP
UUN UR-MCNC: 605 MG/DL — SIGNIFICANT CHANGE UP
WBC # BLD: 12.35 K/UL — HIGH (ref 3.8–10.5)
WBC # FLD AUTO: 12.35 K/UL — HIGH (ref 3.8–10.5)

## 2019-10-26 PROCEDURE — 93888 INTRACRANIAL LIMITED STUDY: CPT | Mod: 26

## 2019-10-26 PROCEDURE — 99233 SBSQ HOSP IP/OBS HIGH 50: CPT

## 2019-10-26 RX ORDER — TAMSULOSIN HYDROCHLORIDE 0.4 MG/1
0.4 CAPSULE ORAL AT BEDTIME
Refills: 0 | Status: DISCONTINUED | OUTPATIENT
Start: 2019-10-26 | End: 2019-11-01

## 2019-10-26 RX ORDER — POTASSIUM CHLORIDE 20 MEQ
40 PACKET (EA) ORAL ONCE
Refills: 0 | Status: COMPLETED | OUTPATIENT
Start: 2019-10-26 | End: 2019-10-26

## 2019-10-26 RX ADMIN — NIMODIPINE 30 MILLIGRAM(S): 60 SOLUTION ORAL at 02:09

## 2019-10-26 RX ADMIN — Medication 975 MILLIGRAM(S): at 13:21

## 2019-10-26 RX ADMIN — TAMSULOSIN HYDROCHLORIDE 0.4 MILLIGRAM(S): 0.4 CAPSULE ORAL at 22:08

## 2019-10-26 RX ADMIN — NIMODIPINE 30 MILLIGRAM(S): 60 SOLUTION ORAL at 14:10

## 2019-10-26 RX ADMIN — OXYCODONE HYDROCHLORIDE 5 MILLIGRAM(S): 5 TABLET ORAL at 10:32

## 2019-10-26 RX ADMIN — Medication 975 MILLIGRAM(S): at 23:36

## 2019-10-26 RX ADMIN — NIMODIPINE 30 MILLIGRAM(S): 60 SOLUTION ORAL at 22:07

## 2019-10-26 RX ADMIN — NIMODIPINE 30 MILLIGRAM(S): 60 SOLUTION ORAL at 16:09

## 2019-10-26 RX ADMIN — NIMODIPINE 30 MILLIGRAM(S): 60 SOLUTION ORAL at 20:17

## 2019-10-26 RX ADMIN — OXYCODONE HYDROCHLORIDE 5 MILLIGRAM(S): 5 TABLET ORAL at 11:02

## 2019-10-26 RX ADMIN — NIMODIPINE 30 MILLIGRAM(S): 60 SOLUTION ORAL at 04:13

## 2019-10-26 RX ADMIN — Medication 975 MILLIGRAM(S): at 12:51

## 2019-10-26 RX ADMIN — Medication 975 MILLIGRAM(S): at 23:06

## 2019-10-26 RX ADMIN — Medication 325 MILLIGRAM(S): at 05:58

## 2019-10-26 RX ADMIN — OXYCODONE HYDROCHLORIDE 5 MILLIGRAM(S): 5 TABLET ORAL at 06:25

## 2019-10-26 RX ADMIN — OXYCODONE HYDROCHLORIDE 5 MILLIGRAM(S): 5 TABLET ORAL at 05:55

## 2019-10-26 RX ADMIN — SODIUM CHLORIDE 50 MILLILITER(S): 5 INJECTION, SOLUTION INTRAVENOUS at 16:52

## 2019-10-26 RX ADMIN — NIMODIPINE 30 MILLIGRAM(S): 60 SOLUTION ORAL at 05:58

## 2019-10-26 RX ADMIN — ENOXAPARIN SODIUM 40 MILLIGRAM(S): 100 INJECTION SUBCUTANEOUS at 18:02

## 2019-10-26 RX ADMIN — NIMODIPINE 30 MILLIGRAM(S): 60 SOLUTION ORAL at 18:02

## 2019-10-26 RX ADMIN — NIMODIPINE 30 MILLIGRAM(S): 60 SOLUTION ORAL at 11:44

## 2019-10-26 RX ADMIN — Medication 40 MILLIEQUIVALENT(S): at 08:20

## 2019-10-26 NOTE — PROVIDER CONTACT NOTE (OTHER) - ASSESSMENT
Pt A&Ox4, vital signs  as charted, Arterial line waveform dampened and over 20mmhg off from blood pressure cuff. Unable to draw labs from line due to poor blood flow.
Pt VSS and afebrile, see flow sheet for vitals
Pt VSS and no complaint of pain. Pt states he sees blurry when he looks initially but if he focuses he sees clear. Pt is able to see from far.

## 2019-10-26 NOTE — PROVIDER CONTACT NOTE (OTHER) - REASON
Pt states he is straining to urinate
Pt states that he sees blurry
Patient Arterial line not functioning

## 2019-10-26 NOTE — PROVIDER CONTACT NOTE (OTHER) - ACTION/TREATMENT ORDERED:
PA stated to removed a-line and that she would address during rounds if patient needs another line.
Dr. Quintanilla on the floor and assessed pt at bedside. No intervention at this time
UA sent to lab

## 2019-10-26 NOTE — PROGRESS NOTE ADULT - SUBJECTIVE AND OBJECTIVE BOX
SUBJECTIVE: Patient states that does not like the hypertonic saline, otherwise no complaints    Vital Signs Last 24 Hrs  T(C): 36.8 (10-26-19 @ 08:00), Max: 37.4 (10-26-19 @ 04:10)  T(F): 98.2 (10-26-19 @ 08:00), Max: 99.3 (10-26-19 @ 04:10)  HR: 67 (10-26-19 @ 11:42) (57 - 80)  BP: 147/96 (10-26-19 @ 11:42) (115/76 - 152/101)  BP(mean): 104 (10-25-19 @ 16:00) (104 - 117)  RR: 18 (10-26-19 @ 11:42) (15 - 18)  SpO2: 98% (10-26-19 @ 11:42) (95% - 100%)    PHYSICAL EXAM:    Constitutional: No Acute Distress     Neurological: Awake, alert, oriented to person, place and time, speech clear and fluent, face equal, tongue midline, briskly following commands, no drift, moves all extremities with 5/5 strength, sensation intact to light touch throughout, pupils 4mm and reactive bilaterally, extraocular movements intact, no nystagmus      Pulmonary: good insp effort    Cardiovascular:  Regular rate and rhythm     Gastrointestinal: Soft, Non-tender, Non-distended    Extremities: No calf tenderness bilaterally, no cyanosis, clubbing or edema          LABS:                          16.0   12.35 )-----------( 187      ( 26 Oct 2019 06:56 )             45.3    10-26    135  |  100  |  20  ----------------------------<  109<H>  3.8   |  21<L>  |  0.75    Ca    8.4      26 Oct 2019 06:56  Phos  3.4     10-25  Mg     2.2     10-25        10-25 @ 07:01  -  10-26 @ 07:00  --------------------------------------------------------  IN: 1120 mL / OUT: 250 mL / NET: 870 mL    10-26 @ 07:01  -  10-26 @ 11:57  --------------------------------------------------------  IN: 120 mL / OUT: 0 mL / NET: 120 mL        IMAGING:     MEDICATIONS:  Antibiotics:    Neuro:  acetaminophen   Tablet .. 975 milliGRAM(s) Oral every 6 hours PRN Temp greater or equal to 38.5C (101.3F), Mild Pain (1 - 3)  oxyCODONE    IR 5 milliGRAM(s) Oral every 4 hours PRN Moderate Pain (4 - 6)  oxyCODONE    IR 10 milliGRAM(s) Oral every 4 hours PRN Severe Pain (7 - 10)    Cardiac:  niMODipine 30 milliGRAM(s) Oral every 2 hours  tamsulosin 0.4 milliGRAM(s) Oral at bedtime    Pulm:    GI/:  polyethylene glycol 3350 17 Gram(s) Oral daily  senna 1 Tablet(s) Oral daily    Other:   aspirin enteric coated 325 milliGRAM(s) Oral <User Schedule>  enoxaparin Injectable 40 milliGRAM(s) SubCutaneous <User Schedule>  influenza   Vaccine 0.5 milliLiter(s) IntraMuscular once  sodium chloride 2% . 1000 milliLiter(s) IV Continuous <Continuous>        DIET:

## 2019-10-26 NOTE — CHART NOTE - NSCHARTNOTEFT_GEN_A_CORE
Transcranial doppler exam #1 (10/24/19) 12pm  mean velocity  cm/sec                              Left             Right  MELISSA                 29(technical)    69  MCA                56                  57  PCA                  23,22            30,28  VERT                  30                34  BA                                39    Official report to follow.  Lelo    Transcranial doppler exam #2 (10/25/19) 1030am  mean velocity  cm/sec                              Left         Right  MELISSA                    36           59  MCA                    53           51  PCA                    28,19       25,24  Official report to follow.  Lelo    Transcranial doppler exam #3 (10/26/19) 1115am  mean velocity  cm/sec                              Left         Right  MELISSA                    47           43  MCA                   51            54  Official report to follow.  Lelo

## 2019-10-26 NOTE — PROGRESS NOTE ADULT - PROBLEM SELECTOR PLAN 1
neuro checks q4h  - off keppra  - per NSG, HH1 MF1  - cont nimodipine as tolerated. Ok to give for HR >50 as long as asymptomatic.  - pt is on  for web. pt is also P2y12 responder.  - pharmacologic DVT ppx as per NSG, currently on lovenox 40. neuro checks q4h  - off keppra  - per NSG  - cont nimodipine as tolerated. Ok to give for HR >50 as long as asymptomatic.  - pt is on  for web  - pharmacologic DVT ppx as per NSG, currently on lovenox 40.

## 2019-10-26 NOTE — PROVIDER CONTACT NOTE (OTHER) - SITUATION
Pt states he is having difficulty urinating. States that he was able to urinate but he felt like he was straining to urinate.
Pt states that he sees blurry
Patient Arterial line not functioning

## 2019-10-26 NOTE — PROGRESS NOTE ADULT - ASSESSMENT
Summary: 53 yo male HH1 mF1 SAH 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)  likely PBD 11 given that he had headache days before admission, CT head didn't show acute SAH     NEURO: neuro checks q 4 hr  MRI - No residual aneurysm  on  mg po daily for Pitts  Delayed Cerebral Ischemia Management: Serial screening TCDs, euvolemia,  Nimodipine   TCD has been nl, will repeat TCD today   Pain: PRN Oxy  Activity: [X] OOB as tolerated [] Bedrest [X] PT [X] OT [] PMNR    PULM: SpO2 > 92%  Incentive spirometry    CV:  SBP goal 100 - 180 mmhg   TTE - Nl systolic function. No segmental wall motion abnormalities    RENAL:  Fluids: on hypertonics, f/u with hospitalist management of hyponatremia  Bolus as needed to target euvolemia     GI:  Diet: Regular diet  GI prophylaxis - d/c famotidine   Bowel regimen  [X] senna [X] Miralax  Last BM: n10/23/19    ENDO:   Goal euglycemia (-180)    HEME/ONC:  VTE prophylaxis: [X] SCDs [] chemoprophylaxis [X] on Lovenox 40 sc qHS    ID: Monitor for fevers    MISC:    SOCIAL/FAMILY:  [] awaiting [X] updated at bedside [] family meeting    Case discussed with attending

## 2019-10-26 NOTE — PROVIDER CONTACT NOTE (OTHER) - BACKGROUND
Pt p/w [facility], s/p 10/22: s/p Pitts embolization of R AComm aneurysm.
Pt s/p Pitts embolization of R AComm aneurysm on 10/22
Pt admitted for SAH

## 2019-10-26 NOTE — PROGRESS NOTE ADULT - SUBJECTIVE AND OBJECTIVE BOX
Patient is a 54y old  Male who presents with a chief complaint of SAH, R acomm (26 Oct 2019 11:56)      INTERVAL HPI/OVERNIGHT EVENTS:  54M c hx vitiligo, and no other PMHx, pw headache, found to have ~4mm R anterior comm artery aneurysm with trace SAH of left sylvian fissure, now s/p web embolization POD#4.      Medications:MEDICATIONS  (STANDING):  aspirin enteric coated 325 milliGRAM(s) Oral <User Schedule>  enoxaparin Injectable 40 milliGRAM(s) SubCutaneous <User Schedule>  influenza   Vaccine 0.5 milliLiter(s) IntraMuscular once  niMODipine 30 milliGRAM(s) Oral every 2 hours  polyethylene glycol 3350 17 Gram(s) Oral daily  senna 1 Tablet(s) Oral daily  sodium chloride 2% . 1000 milliLiter(s) (50 mL/Hr) IV Continuous <Continuous>  tamsulosin 0.4 milliGRAM(s) Oral at bedtime    MEDICATIONS  (PRN):  acetaminophen   Tablet .. 975 milliGRAM(s) Oral every 6 hours PRN Temp greater or equal to 38.5C (101.3F), Mild Pain (1 - 3)  oxyCODONE    IR 5 milliGRAM(s) Oral every 4 hours PRN Moderate Pain (4 - 6)  oxyCODONE    IR 10 milliGRAM(s) Oral every 4 hours PRN Severe Pain (7 - 10)      Allergies: Allergies    No Known Allergies      REVIEW OF SYSTEMS:  CONSTITUTIONAL: No weakness. No fevers. No chills. No rigors. No weight loss. No night sweats. +poor appetite.  EYES: No visual changes. No eye pain.  ENT: No hearing difficulty. No vertigo. No dysphagia. No sore throat. No Sinusitis/rhinorrhea.   NECK: No pain. No stiffness/rigidity.  CARDIAC: No chest pain. No palpitations. No lightheadedness.  RESPIRATORY: No cough. No SOB. No hemoptysis.  GASTROINTESTINAL: No abdominal pain. No nausea. No vomiting. No hematemesis. No diarrhea. +constipation. No melena. No hematochezia.  GENITOURINARY: No dysuria. No frequency. No hesitancy. No hematuria. No oliguria.  NEUROLOGICAL: No numbness/tingling. No focal weakness. No urinary or fecal incontinence. No headache. No unsteady gait.  BACK: No back pain. No flank pain.  EXTREMITIES: No lower extremity edema. Full ROM. No joint pain.  SKIN: No rashes. No itching. No other lesions.  PSYCHIATRIC: No depression. No anxiety. No SI/HI.  ALLERGIC: No lip swelling. No hives.    T(C): 36.8 (10-26-19 @ 08:00), Max: 37.4 (10-26-19 @ 04:10)  HR: 67 (10-26-19 @ 11:42) (57 - 80)  BP: 147/96 (10-26-19 @ 11:42) (115/76 - 152/101)  RR: 18 (10-26-19 @ 11:42) (15 - 18)  SpO2: 98% (10-26-19 @ 11:42) (95% - 100%)  Wt(kg): --Vital Signs Last 24 Hrs  T(C): 36.8 (26 Oct 2019 08:00), Max: 37.4 (26 Oct 2019 04:10)  T(F): 98.2 (26 Oct 2019 08:00), Max: 99.3 (26 Oct 2019 04:10)  HR: 67 (26 Oct 2019 11:42) (57 - 80)  BP: 147/96 (26 Oct 2019 11:42) (115/76 - 152/101)  BP(mean): 104 (25 Oct 2019 16:00) (104 - 117)  RR: 18 (26 Oct 2019 11:42) (15 - 18)  SpO2: 98% (26 Oct 2019 11:42) (95% - 100%)  I&O's Summary    25 Oct 2019 07:  -  26 Oct 2019 07:00  --------------------------------------------------------  IN: 1120 mL / OUT: 250 mL / NET: 870 mL    26 Oct 2019 07:01  -  26 Oct 2019 12:51  --------------------------------------------------------  IN: 120 mL / OUT: 0 mL / NET: 120 mL        PHYSICAL EXAM:  GENERAL: Alert. Not confused. No acute distress. Not thin. Not cachectic. Not obese.  HEAD:  Atraumatic. Normocephalic.  EYES: EOMI. PERRLA. Normal conjunctiva/sclera.  ENT: Neck supple. No JVD. Moist oral mucosa. Not edentulous. No thrush.  LYMPH: Normal supraclavicular/cervical lymph nodes.   CARDIAC: Not tachy, Not [place]. Regular rhythm. Not irregularly irregular. S1. S2. No murmur. No rub. No distant heart sounds.  LUNG/CHEST: CTAB. BS equal bilaterally. No wheezes. No rales. No rhonchi.  ABDOMEN: Soft. No tenderness. No distension. No fluid wave. Normal bowel sounds.  BACK: No midline/vertebral tenderness. No flank tenderness.  VASCULAR: +2 b/l radial or ulnar pulses. Palpable DP pulses.  EXTREMITIES:  No clubbing. No cyanosis. No edema. Moving all 4.  NEUROLOGY: A&Ox3. Non-focal exam. Cranial nerves intact. Normal speech. Sensation intact.  PSYCH: Normal behavior. Normal affect.    Consultant(s) Notes Reviewed:  [x ] YES  [ ] NO  Care Discussed with Consultants/Other Providers [ x] YES  [ ] NO  Name of Consultant  LABS:                        16.0   12.35 )-----------( 187      ( 26 Oct 2019 06:56 )             45.3     10-26    135  |  100  |  20  ----------------------------<  109<H>  3.8   |  21<L>  |  0.75    Ca    8.4      26 Oct 2019 06:56  Phos  3.4     10-25  Mg     2.2     10-25        Urinalysis Basic - ( 26 Oct 2019 02:32 )    Color: Light Yellow / Appearance: Clear / S.013 / pH: x  Gluc: x / Ketone: Negative  / Bili: Negative / Urobili: <2 mg/dL   Blood: x / Protein: Negative / Nitrite: Negative   Leuk Esterase: Negative / RBC: x / WBC x   Sq Epi: x / Non Sq Epi: x / Bacteria: x      CAPILLARY BLOOD GLUCOSE            Urinalysis Basic - ( 26 Oct 2019 02:32 )    Color: Light Yellow / Appearance: Clear / S.013 / pH: x  Gluc: x / Ketone: Negative  / Bili: Negative / Urobili: <2 mg/dL   Blood: x / Protein: Negative / Nitrite: Negative   Leuk Esterase: Negative / RBC: x / WBC x   Sq Epi: x / Non Sq Epi: x / Bacteria: x        RADIOLOGY & ADDITIONAL TESTS:  EKG :     Imaging Personally Reviewed:  [ ] YES  [ ] NO  HEALTH ISSUES - PROBLEM Dx:  Essential hypertension: Essential hypertension  Hyponatremia: Hyponatremia  Leukocytosis, unspecified type: Leukocytosis, unspecified type  Subarachnoid bleed: Subarachnoid bleed Patient is a 54y old  Male who presents with a chief complaint of SAH, R acomm (26 Oct 2019 11:56)      INTERVAL HPI/OVERNIGHT EVENTS:  54M c hx vitiligo, and no other PMHx, pw headache, found to have ~4mm R anterior comm artery aneurysm with trace SAH of left sylvian fissure, now s/p web embolization POD#4.  Patient states that he feels better today and urinates well today.        Medications:MEDICATIONS  (STANDING):  aspirin enteric coated 325 milliGRAM(s) Oral <User Schedule>  enoxaparin Injectable 40 milliGRAM(s) SubCutaneous <User Schedule>  influenza   Vaccine 0.5 milliLiter(s) IntraMuscular once  niMODipine 30 milliGRAM(s) Oral every 2 hours  polyethylene glycol 3350 17 Gram(s) Oral daily  senna 1 Tablet(s) Oral daily  sodium chloride 2% . 1000 milliLiter(s) (50 mL/Hr) IV Continuous <Continuous>  tamsulosin 0.4 milliGRAM(s) Oral at bedtime    MEDICATIONS  (PRN):  acetaminophen   Tablet .. 975 milliGRAM(s) Oral every 6 hours PRN Temp greater or equal to 38.5C (101.3F), Mild Pain (1 - 3)  oxyCODONE    IR 5 milliGRAM(s) Oral every 4 hours PRN Moderate Pain (4 - 6)  oxyCODONE    IR 10 milliGRAM(s) Oral every 4 hours PRN Severe Pain (7 - 10)      Allergies: Allergies    No Known Allergies      REVIEW OF SYSTEMS:  CONSTITUTIONAL: No weakness. No fevers. No chills. No rigors. No weight loss. No night sweats. +poor appetite.  NECK: No pain. No stiffness/rigidity.  CARDIAC: No chest pain. No palpitations. No lightheadedness.  RESPIRATORY: No cough. No SOB. No hemoptysis.  GASTROINTESTINAL: No abdominal pain. No nausea. No vomiting.   GENITOURINARY: urinates well today as he states   NEUROLOGICAL: No numbness/tingling.   PSYCHIATRIC: No depression. No anxiety. No SI/HI.      T(C): 36.8 (10-26-19 @ 08:00), Max: 37.4 (10-26-19 @ 04:10)  HR: 67 (10-26-19 @ 11:42) (57 - 80)  BP: 147/96 (10-26-19 @ 11:42) (115/76 - 152/101)  RR: 18 (10-26-19 @ 11:42) (15 - 18)  SpO2: 98% (10-26-19 @ 11:42) (95% - 100%)  Wt(kg): --Vital Signs Last 24 Hrs  T(C): 36.8 (26 Oct 2019 08:00), Max: 37.4 (26 Oct 2019 04:10)  T(F): 98.2 (26 Oct 2019 08:00), Max: 99.3 (26 Oct 2019 04:10)  HR: 67 (26 Oct 2019 11:42) (57 - 80)  BP: 147/96 (26 Oct 2019 11:42) (115/76 - 152/101)  BP(mean): 104 (25 Oct 2019 16:00) (104 - 117)  RR: 18 (26 Oct 2019 11:42) (15 - 18)  SpO2: 98% (26 Oct 2019 11:42) (95% - 100%)  I&O's Summary    25 Oct 2019 07:01  -  26 Oct 2019 07:00  --------------------------------------------------------  IN: 1120 mL / OUT: 250 mL / NET: 870 mL    26 Oct 2019 07:01  -  26 Oct 2019 12:51  --------------------------------------------------------  IN: 120 mL / OUT: 0 mL / NET: 120 mL        PHYSICAL EXAM:  GENERAL: Alert. Not confused. No acute distress. Not thin. Not cachectic. Not obese.  HEAD:  Atraumatic. Normocephalic.  LYMPH: Normal supraclavicular/cervical lymph nodes.   CARDIAC: S1 S2 regular   LUNG/CHEST: CTAB. BS equal bilaterally. No wheezes. No rales. No rhonchi.  ABDOMEN: Soft. No tenderness. No distension. No fluid wave. Normal bowel sounds.  BACK: No flank tenderness.  VASCULAR:  Palpable DP pulses.  EXTREMITIES:  No clubbing. No cyanosis. No edema. Moving all 4.  NEUROLOGY: A&Ox3. Non-focal exam.   PSYCH: Normal behavior. Normal affect.    Consultant(s) Notes Reviewed:  [x ] YES  [ ] NO  Care Discussed with Consultants/Other Providers [ x] YES  [ ] NO  Name of Consultant  LABS:                        16.0   12.35 )-----------( 187      ( 26 Oct 2019 06:56 )             45.3     10-26    135  |  100  |  20  ----------------------------<  109<H>  3.8   |  21<L>  |  0.75    Ca    8.4      26 Oct 2019 06:56  Phos  3.4     10-25  Mg     2.2     10-25        Urinalysis Basic - ( 26 Oct 2019 02:32 )    Color: Light Yellow / Appearance: Clear / S.013 / pH: x  Gluc: x / Ketone: Negative  / Bili: Negative / Urobili: <2 mg/dL   Blood: x / Protein: Negative / Nitrite: Negative   Leuk Esterase: Negative / RBC: x / WBC x   Sq Epi: x / Non Sq Epi: x / Bacteria: x      CAPILLARY BLOOD GLUCOSE            Urinalysis Basic - ( 26 Oct 2019 02:32 )    Color: Light Yellow / Appearance: Clear / S.013 / pH: x  Gluc: x / Ketone: Negative  / Bili: Negative / Urobili: <2 mg/dL   Blood: x / Protein: Negative / Nitrite: Negative   Leuk Esterase: Negative / RBC: x / WBC x   Sq Epi: x / Non Sq Epi: x / Bacteria: x        RADIOLOGY & ADDITIONAL TESTS:  EKG :     Imaging Personally Reviewed:  [ ] YES  [ ] NO  HEALTH ISSUES - PROBLEM Dx:  Essential hypertension: Essential hypertension  Hyponatremia: Hyponatremia  Leukocytosis, unspecified type: Leukocytosis, unspecified type  Subarachnoid bleed: Subarachnoid bleed

## 2019-10-27 LAB
ANION GAP SERPL CALC-SCNC: 10 MMOL/L — SIGNIFICANT CHANGE UP (ref 5–17)
ANION GAP SERPL CALC-SCNC: 12 MMOL/L — SIGNIFICANT CHANGE UP (ref 5–17)
ANION GAP SERPL CALC-SCNC: 9 MMOL/L — SIGNIFICANT CHANGE UP (ref 5–17)
BUN SERPL-MCNC: 15 MG/DL — SIGNIFICANT CHANGE UP (ref 7–23)
BUN SERPL-MCNC: 16 MG/DL — SIGNIFICANT CHANGE UP (ref 7–23)
BUN SERPL-MCNC: 17 MG/DL — SIGNIFICANT CHANGE UP (ref 7–23)
CALCIUM SERPL-MCNC: 7.8 MG/DL — LOW (ref 8.4–10.5)
CALCIUM SERPL-MCNC: 8.1 MG/DL — LOW (ref 8.4–10.5)
CALCIUM SERPL-MCNC: 8.3 MG/DL — LOW (ref 8.4–10.5)
CHLORIDE SERPL-SCNC: 101 MMOL/L — SIGNIFICANT CHANGE UP (ref 96–108)
CHLORIDE SERPL-SCNC: 102 MMOL/L — SIGNIFICANT CHANGE UP (ref 96–108)
CHLORIDE SERPL-SCNC: 99 MMOL/L — SIGNIFICANT CHANGE UP (ref 96–108)
CO2 SERPL-SCNC: 21 MMOL/L — LOW (ref 22–31)
CO2 SERPL-SCNC: 22 MMOL/L — SIGNIFICANT CHANGE UP (ref 22–31)
CO2 SERPL-SCNC: 24 MMOL/L — SIGNIFICANT CHANGE UP (ref 22–31)
CREAT SERPL-MCNC: 0.74 MG/DL — SIGNIFICANT CHANGE UP (ref 0.5–1.3)
CREAT SERPL-MCNC: 0.84 MG/DL — SIGNIFICANT CHANGE UP (ref 0.5–1.3)
CREAT SERPL-MCNC: 0.85 MG/DL — SIGNIFICANT CHANGE UP (ref 0.5–1.3)
GLUCOSE SERPL-MCNC: 125 MG/DL — HIGH (ref 70–99)
GLUCOSE SERPL-MCNC: 132 MG/DL — HIGH (ref 70–99)
GLUCOSE SERPL-MCNC: 143 MG/DL — HIGH (ref 70–99)
POTASSIUM SERPL-MCNC: 4 MMOL/L — SIGNIFICANT CHANGE UP (ref 3.5–5.3)
POTASSIUM SERPL-MCNC: 4 MMOL/L — SIGNIFICANT CHANGE UP (ref 3.5–5.3)
POTASSIUM SERPL-MCNC: 4.2 MMOL/L — SIGNIFICANT CHANGE UP (ref 3.5–5.3)
POTASSIUM SERPL-SCNC: 4 MMOL/L — SIGNIFICANT CHANGE UP (ref 3.5–5.3)
POTASSIUM SERPL-SCNC: 4 MMOL/L — SIGNIFICANT CHANGE UP (ref 3.5–5.3)
POTASSIUM SERPL-SCNC: 4.2 MMOL/L — SIGNIFICANT CHANGE UP (ref 3.5–5.3)
SODIUM SERPL-SCNC: 132 MMOL/L — LOW (ref 135–145)
SODIUM SERPL-SCNC: 133 MMOL/L — LOW (ref 135–145)
SODIUM SERPL-SCNC: 135 MMOL/L — SIGNIFICANT CHANGE UP (ref 135–145)

## 2019-10-27 PROCEDURE — 99232 SBSQ HOSP IP/OBS MODERATE 35: CPT

## 2019-10-27 PROCEDURE — 99233 SBSQ HOSP IP/OBS HIGH 50: CPT

## 2019-10-27 RX ORDER — ACETAMINOPHEN 500 MG
1000 TABLET ORAL ONCE
Refills: 0 | Status: COMPLETED | OUTPATIENT
Start: 2019-10-27 | End: 2019-10-27

## 2019-10-27 RX ORDER — SODIUM CHLORIDE 9 MG/ML
1 INJECTION INTRAMUSCULAR; INTRAVENOUS; SUBCUTANEOUS EVERY 8 HOURS
Refills: 0 | Status: DISCONTINUED | OUTPATIENT
Start: 2019-10-27 | End: 2019-10-28

## 2019-10-27 RX ADMIN — NIMODIPINE 30 MILLIGRAM(S): 60 SOLUTION ORAL at 10:30

## 2019-10-27 RX ADMIN — NIMODIPINE 30 MILLIGRAM(S): 60 SOLUTION ORAL at 22:13

## 2019-10-27 RX ADMIN — NIMODIPINE 30 MILLIGRAM(S): 60 SOLUTION ORAL at 02:22

## 2019-10-27 RX ADMIN — NIMODIPINE 30 MILLIGRAM(S): 60 SOLUTION ORAL at 20:09

## 2019-10-27 RX ADMIN — NIMODIPINE 30 MILLIGRAM(S): 60 SOLUTION ORAL at 00:01

## 2019-10-27 RX ADMIN — Medication 325 MILLIGRAM(S): at 05:06

## 2019-10-27 RX ADMIN — OXYCODONE HYDROCHLORIDE 5 MILLIGRAM(S): 5 TABLET ORAL at 18:15

## 2019-10-27 RX ADMIN — OXYCODONE HYDROCHLORIDE 5 MILLIGRAM(S): 5 TABLET ORAL at 10:35

## 2019-10-27 RX ADMIN — Medication 1000 MILLIGRAM(S): at 12:19

## 2019-10-27 RX ADMIN — Medication 400 MILLIGRAM(S): at 11:49

## 2019-10-27 RX ADMIN — ENOXAPARIN SODIUM 40 MILLIGRAM(S): 100 INJECTION SUBCUTANEOUS at 18:16

## 2019-10-27 RX ADMIN — OXYCODONE HYDROCHLORIDE 5 MILLIGRAM(S): 5 TABLET ORAL at 11:05

## 2019-10-27 RX ADMIN — OXYCODONE HYDROCHLORIDE 10 MILLIGRAM(S): 5 TABLET ORAL at 00:33

## 2019-10-27 RX ADMIN — NIMODIPINE 30 MILLIGRAM(S): 60 SOLUTION ORAL at 11:59

## 2019-10-27 RX ADMIN — NIMODIPINE 30 MILLIGRAM(S): 60 SOLUTION ORAL at 16:14

## 2019-10-27 RX ADMIN — Medication 400 MILLIGRAM(S): at 21:05

## 2019-10-27 RX ADMIN — Medication 1000 MILLIGRAM(S): at 21:35

## 2019-10-27 RX ADMIN — OXYCODONE HYDROCHLORIDE 5 MILLIGRAM(S): 5 TABLET ORAL at 18:45

## 2019-10-27 RX ADMIN — OXYCODONE HYDROCHLORIDE 10 MILLIGRAM(S): 5 TABLET ORAL at 00:03

## 2019-10-27 RX ADMIN — NIMODIPINE 30 MILLIGRAM(S): 60 SOLUTION ORAL at 14:13

## 2019-10-27 NOTE — PROGRESS NOTE ADULT - SUBJECTIVE AND OBJECTIVE BOX
Patient is a 54y old  Male who presents with a chief complaint of subarachnoid hemorrhage (27 Oct 2019 10:35)      INTERVAL HPI/OVERNIGHT EVENTS:    NO current issues presented today       Medications:MEDICATIONS  (STANDING):  aspirin enteric coated 325 milliGRAM(s) Oral <User Schedule>  enoxaparin Injectable 40 milliGRAM(s) SubCutaneous <User Schedule>  influenza   Vaccine 0.5 milliLiter(s) IntraMuscular once  niMODipine 30 milliGRAM(s) Oral every 2 hours  polyethylene glycol 3350 17 Gram(s) Oral daily  senna 1 Tablet(s) Oral daily  sodium chloride 2% . 1000 milliLiter(s) (50 mL/Hr) IV Continuous <Continuous>  tamsulosin 0.4 milliGRAM(s) Oral at bedtime    MEDICATIONS  (PRN):  acetaminophen   Tablet .. 975 milliGRAM(s) Oral every 6 hours PRN Temp greater or equal to 38.5C (101.3F), Mild Pain (1 - 3)  oxyCODONE    IR 5 milliGRAM(s) Oral every 4 hours PRN Moderate Pain (4 - 6)  oxyCODONE    IR 10 milliGRAM(s) Oral every 4 hours PRN Severe Pain (7 - 10)      Allergies: Allergies    No Known Allergies        REVIEW OF SYSTEMS:  CONSTITUTIONAL: No weakness. No fevers. No chills. No rigors. No weight loss. No night sweats. +poor appetite.  NECK: No pain. No stiffness/rigidity.  CARDIAC: No chest pain. No palpitations. No lightheadedness.  RESPIRATORY: No cough. No SOB. No hemoptysis.  GASTROINTESTINAL: No abdominal pain. No nausea. No vomiting.   GENITOURINARY: urinates well today as he states   NEUROLOGICAL: No numbness/tingling.   PSYCHIATRIC: No depression. No anxiety. No SI/HI.    T(C): 36.7 (10-27-19 @ 07:53), Max: 37.6 (10-26-19 @ 20:15)  HR: 60 (10-27-19 @ 07:53) (60 - 78)  BP: 131/86 (10-27-19 @ 07:53) (109/63 - 147/96)  RR: 18 (10-27-19 @ 07:53) (18 - 18)  SpO2: 100% (10-27-19 @ 07:53) (95% - 100%)  Wt(kg): --Vital Signs Last 24 Hrs  T(C): 36.7 (27 Oct 2019 07:53), Max: 37.6 (26 Oct 2019 20:15)  T(F): 98.1 (27 Oct 2019 07:53), Max: 99.6 (26 Oct 2019 20:15)  HR: 60 (27 Oct 2019 07:53) (60 - 78)  BP: 131/86 (27 Oct 2019 07:53) (109/63 - 147/96)  BP(mean): --  RR: 18 (27 Oct 2019 07:53) (18 - 18)  SpO2: 100% (27 Oct 2019 07:53) (95% - 100%)  I&O's Summary    26 Oct 2019 07:01  -  27 Oct 2019 07:00  --------------------------------------------------------  IN: 240 mL / OUT: 0 mL / NET: 240 mL        PHYSICAL EXAM:  GENERAL: Alert. Not confused. No acute distress. Not thin. Not cachectic. Not obese.  HEAD:  Atraumatic. Normocephalic.  LYMPH: Normal supraclavicular/cervical lymph nodes.   CARDIAC: S1 S2 regular   LUNG/CHEST: CTAB. BS equal bilaterally. No wheezes. No rales. No rhonchi.  ABDOMEN: Soft. No tenderness. No distension. No fluid wave. Normal bowel sounds.  BACK: No flank tenderness.  VASCULAR:  Palpable DP pulses.  EXTREMITIES:  No clubbing. No cyanosis. No edema. Moving all 4.  NEUROLOGY: A&Ox3. Non-focal exam.     Consultant(s) Notes Reviewed:  [x ] YES  [ ] NO  Care Discussed with Consultants/Other Providers [ x] YES  [ ] NO  Name of Consultant  LABS:                        16.0   12.35 )-----------( 187      ( 26 Oct 2019 06:56 )             45.3     10-    133<L>  |  102  |  17  ----------------------------<  125<H>  4.0   |  22  |  0.74    Ca    8.3<L>      27 Oct 2019 07:16  Phos  3.4     10-25  Mg     2.2     10-25        Urinalysis Basic - ( 26 Oct 2019 02:32 )    Color: Light Yellow / Appearance: Clear / S.013 / pH: x  Gluc: x / Ketone: Negative  / Bili: Negative / Urobili: <2 mg/dL   Blood: x / Protein: Negative / Nitrite: Negative   Leuk Esterase: Negative / RBC: x / WBC x   Sq Epi: x / Non Sq Epi: x / Bacteria: x      CAPILLARY BLOOD GLUCOSE            Urinalysis Basic - ( 26 Oct 2019 02:32 )    Color: Light Yellow / Appearance: Clear / S.013 / pH: x  Gluc: x / Ketone: Negative  / Bili: Negative / Urobili: <2 mg/dL   Blood: x / Protein: Negative / Nitrite: Negative   Leuk Esterase: Negative / RBC: x / WBC x   Sq Epi: x / Non Sq Epi: x / Bacteria: x        RADIOLOGY & ADDITIONAL TESTS:  EKG :     Imaging Personally Reviewed:  [ ] YES  [ ] NO  HEALTH ISSUES - PROBLEM Dx:  Essential hypertension: Essential hypertension  Hyponatremia: Hyponatremia  Leukocytosis, unspecified type: Leukocytosis, unspecified type  Subarachnoid bleed: Subarachnoid bleed Patient is a 54y old  Male who presents with a chief complaint of subarachnoid hemorrhage (27 Oct 2019 10:35)      INTERVAL HPI/OVERNIGHT EVENTS:    NO current issues presented today except for headache       Medications:MEDICATIONS  (STANDING):  aspirin enteric coated 325 milliGRAM(s) Oral <User Schedule>  enoxaparin Injectable 40 milliGRAM(s) SubCutaneous <User Schedule>  influenza   Vaccine 0.5 milliLiter(s) IntraMuscular once  niMODipine 30 milliGRAM(s) Oral every 2 hours  polyethylene glycol 3350 17 Gram(s) Oral daily  senna 1 Tablet(s) Oral daily  sodium chloride 2% . 1000 milliLiter(s) (50 mL/Hr) IV Continuous <Continuous>  tamsulosin 0.4 milliGRAM(s) Oral at bedtime    MEDICATIONS  (PRN):  acetaminophen   Tablet .. 975 milliGRAM(s) Oral every 6 hours PRN Temp greater or equal to 38.5C (101.3F), Mild Pain (1 - 3)  oxyCODONE    IR 5 milliGRAM(s) Oral every 4 hours PRN Moderate Pain (4 - 6)  oxyCODONE    IR 10 milliGRAM(s) Oral every 4 hours PRN Severe Pain (7 - 10)      Allergies: Allergies    No Known Allergies        REVIEW OF SYSTEMS:  CONSTITUTIONAL: No weakness. No fevers. No chills. No rigors. No weight loss. No night sweats. +poor appetite.  NECK: No pain. No stiffness/rigidity.  CARDIAC: No chest pain. No palpitations. No lightheadedness.  RESPIRATORY: No cough. No SOB. No hemoptysis.  GASTROINTESTINAL: No abdominal pain. No nausea. No vomiting.   GENITOURINARY: urinates well today as he states   NEUROLOGICAL: No numbness/tingling.   PSYCHIATRIC: No depression. No anxiety. No SI/HI.    T(C): 36.7 (10-27-19 @ 07:53), Max: 37.6 (10-26-19 @ 20:15)  HR: 60 (10-27-19 @ 07:53) (60 - 78)  BP: 131/86 (10-27-19 @ 07:53) (109/63 - 147/96)  RR: 18 (10-27-19 @ 07:53) (18 - 18)  SpO2: 100% (10-27-19 @ 07:53) (95% - 100%)  Wt(kg): --Vital Signs Last 24 Hrs  T(C): 36.7 (27 Oct 2019 07:53), Max: 37.6 (26 Oct 2019 20:15)  T(F): 98.1 (27 Oct 2019 07:53), Max: 99.6 (26 Oct 2019 20:15)  HR: 60 (27 Oct 2019 07:53) (60 - 78)  BP: 131/86 (27 Oct 2019 07:53) (109/63 - 147/96)  BP(mean): --  RR: 18 (27 Oct 2019 07:53) (18 - 18)  SpO2: 100% (27 Oct 2019 07:53) (95% - 100%)  I&O's Summary    26 Oct 2019 07:01  -  27 Oct 2019 07:00  --------------------------------------------------------  IN: 240 mL / OUT: 0 mL / NET: 240 mL        PHYSICAL EXAM:  GENERAL: Alert. Not confused. No acute distress. Not thin. Not cachectic. Not obese.  HEAD:  Atraumatic. Normocephalic.  LYMPH: Normal supraclavicular/cervical lymph nodes.   CARDIAC: S1 S2 regular   LUNG/CHEST: CTAB. BS equal bilaterally. No wheezes. No rales. No rhonchi.  ABDOMEN: Soft. No tenderness. No distension. No fluid wave. Normal bowel sounds.  BACK: No flank tenderness.  VASCULAR:  Palpable DP pulses.  EXTREMITIES:  No clubbing. No cyanosis. No edema. Moving all 4.  NEUROLOGY: A&Ox3. Non-focal exam.     Consultant(s) Notes Reviewed:  [x ] YES  [ ] NO  Care Discussed with Consultants/Other Providers [ x] YES  [ ] NO  Name of Consultant  LABS:                        16.0   12.35 )-----------( 187      ( 26 Oct 2019 06:56 )             45.3     10-    133<L>  |  102  |  17  ----------------------------<  125<H>  4.0   |  22  |  0.74    Ca    8.3<L>      27 Oct 2019 07:16  Phos  3.4     10-25  Mg     2.2     10-25        Urinalysis Basic - ( 26 Oct 2019 02:32 )    Color: Light Yellow / Appearance: Clear / S.013 / pH: x  Gluc: x / Ketone: Negative  / Bili: Negative / Urobili: <2 mg/dL   Blood: x / Protein: Negative / Nitrite: Negative   Leuk Esterase: Negative / RBC: x / WBC x   Sq Epi: x / Non Sq Epi: x / Bacteria: x      CAPILLARY BLOOD GLUCOSE            Urinalysis Basic - ( 26 Oct 2019 02:32 )    Color: Light Yellow / Appearance: Clear / S.013 / pH: x  Gluc: x / Ketone: Negative  / Bili: Negative / Urobili: <2 mg/dL   Blood: x / Protein: Negative / Nitrite: Negative   Leuk Esterase: Negative / RBC: x / WBC x   Sq Epi: x / Non Sq Epi: x / Bacteria: x        RADIOLOGY & ADDITIONAL TESTS:  EKG :     Imaging Personally Reviewed:  [ ] YES  [ ] NO  HEALTH ISSUES - PROBLEM Dx:  Essential hypertension: Essential hypertension  Hyponatremia: Hyponatremia  Leukocytosis, unspecified type: Leukocytosis, unspecified type  Subarachnoid bleed: Subarachnoid bleed Patient is a 54y old  Male who presents with a chief complaint of subarachnoid hemorrhage (27 Oct 2019 10:35)      INTERVAL HPI/OVERNIGHT EVENTS:    NO current issues presented today except for headache       Medications:MEDICATIONS  (STANDING):  aspirin enteric coated 325 milliGRAM(s) Oral <User Schedule>  enoxaparin Injectable 40 milliGRAM(s) SubCutaneous <User Schedule>  influenza   Vaccine 0.5 milliLiter(s) IntraMuscular once  niMODipine 30 milliGRAM(s) Oral every 2 hours  polyethylene glycol 3350 17 Gram(s) Oral daily  senna 1 Tablet(s) Oral daily  sodium chloride 2% . 1000 milliLiter(s) (50 mL/Hr) IV Continuous <Continuous>  tamsulosin 0.4 milliGRAM(s) Oral at bedtime    MEDICATIONS  (PRN):  acetaminophen   Tablet .. 975 milliGRAM(s) Oral every 6 hours PRN Temp greater or equal to 38.5C (101.3F), Mild Pain (1 - 3)  oxyCODONE    IR 5 milliGRAM(s) Oral every 4 hours PRN Moderate Pain (4 - 6)  oxyCODONE    IR 10 milliGRAM(s) Oral every 4 hours PRN Severe Pain (7 - 10)      Allergies: Allergies    No Known Allergies        REVIEW OF SYSTEMS:  CONSTITUTIONAL: No weakness. No fevers. No chills. No rigors. No weight loss. No night sweats. +poor appetite.  NECK: No pain. No stiffness/rigidity.  CARDIAC: No chest pain. No palpitations. No lightheadedness.  RESPIRATORY: No cough. No SOB. No hemoptysis.  GASTROINTESTINAL: No abdominal pain. No nausea. No vomiting.   GENITOURINARY: urinates well today as he states   NEUROLOGICAL: No numbness/tingling, pos headache   PSYCHIATRIC: No depression. No anxiety. No SI/HI.    T(C): 36.7 (10-27-19 @ 07:53), Max: 37.6 (10-26-19 @ 20:15)  HR: 60 (10-27-19 @ 07:53) (60 - 78)  BP: 131/86 (10-27-19 @ 07:53) (109/63 - 147/96)  RR: 18 (10-27-19 @ 07:53) (18 - 18)  SpO2: 100% (10-27-19 @ 07:53) (95% - 100%)  Wt(kg): --Vital Signs Last 24 Hrs  T(C): 36.7 (27 Oct 2019 07:53), Max: 37.6 (26 Oct 2019 20:15)  T(F): 98.1 (27 Oct 2019 07:53), Max: 99.6 (26 Oct 2019 20:15)  HR: 60 (27 Oct 2019 07:53) (60 - 78)  BP: 131/86 (27 Oct 2019 07:53) (109/63 - 147/96)  BP(mean): --  RR: 18 (27 Oct 2019 07:53) (18 - 18)  SpO2: 100% (27 Oct 2019 07:53) (95% - 100%)  I&O's Summary    26 Oct 2019 07:01  -  27 Oct 2019 07:00  --------------------------------------------------------  IN: 240 mL / OUT: 0 mL / NET: 240 mL        PHYSICAL EXAM:  GENERAL: Alert. Not confused. No acute distress. Not thin. Not cachectic. Not obese.  LYMPH: Normal supraclavicular/cervical lymph nodes.   CARDIAC: S1 S2 regular   LUNG/CHEST: CTAB. BS equal bilaterally. No wheezes. No rales. No rhonchi.  ABDOMEN: Soft. No tenderness. No distension. No fluid wave. Normal bowel sounds.  VASCULAR:  Palpable DP pulses.  EXTREMITIES:  No clubbing. No cyanosis. No edema. Moving all 4.  NEUROLOGY: A&Ox3. Non-focal exam.     Consultant(s) Notes Reviewed:  [x ] YES  [ ] NO  Care Discussed with Consultants/Other Providers [ x] YES  [ ] NO  Name of Consultant  LABS:                        16.0   12.35 )-----------( 187      ( 26 Oct 2019 06:56 )             45.3     10-27    133<L>  |  102  |  17  ----------------------------<  125<H>  4.0   |  22  |  0.74    Ca    8.3<L>      27 Oct 2019 07:16  Phos  3.4     10-25  Mg     2.2     10-25        Urinalysis Basic - ( 26 Oct 2019 02:32 )    Color: Light Yellow / Appearance: Clear / S.013 / pH: x  Gluc: x / Ketone: Negative  / Bili: Negative / Urobili: <2 mg/dL   Blood: x / Protein: Negative / Nitrite: Negative   Leuk Esterase: Negative / RBC: x / WBC x   Sq Epi: x / Non Sq Epi: x / Bacteria: x      CAPILLARY BLOOD GLUCOSE            Urinalysis Basic - ( 26 Oct 2019 02:32 )    Color: Light Yellow / Appearance: Clear / S.013 / pH: x  Gluc: x / Ketone: Negative  / Bili: Negative / Urobili: <2 mg/dL   Blood: x / Protein: Negative / Nitrite: Negative   Leuk Esterase: Negative / RBC: x / WBC x   Sq Epi: x / Non Sq Epi: x / Bacteria: x        RADIOLOGY & ADDITIONAL TESTS:  EKG :     Imaging Personally Reviewed:  [ ] YES  [ ] NO  HEALTH ISSUES - PROBLEM Dx:  Essential hypertension: Essential hypertension  Hyponatremia: Hyponatremia  Leukocytosis, unspecified type: Leukocytosis, unspecified type  Subarachnoid bleed: Subarachnoid bleed

## 2019-10-27 NOTE — PROGRESS NOTE ADULT - PROBLEM SELECTOR PLAN 1
- cont neuro check   - care as per Neuro surgery team   - cont nimodipine for vasospasm as per NeuroSX   - pt is on  for web  - pharmacologic DVT ppx as per NSG, currently on lovenox 40.

## 2019-10-27 NOTE — PROGRESS NOTE ADULT - ASSESSMENT
HPI:  Patient is a 54 year old male that presented with headaches and dizziness.  Workup revealed subarachnoid hemorrhage.  Admitted for further management.    PROCEDURE: s/p cerebral angiogram for WEB procedure of ACOMM aneurysm on 10/22/2019  POD#5    PLAN:  -continue 2% nacl, monitor sodium levels  -continue aspirin 325mg PO daily  -lovenox and SCDs for DVT prophylaxis  -nimodipine for vasospasm  -oxycodone for pain control  -senna and miralax for bowel regimen  -regular diet, no beef, no pork  -out of bed with assistance  -incentive spirometer for lung expansion  -pt - no pt needs upon discharge  -ot - no ot needs upon discharge  -am labs    Spectra #[id number]                Assessment:  Please Check When Present   []  GCS  E   V  M     Heart Failure: []Acute, [] acute on chronic , []chronic  Heart Failure:  [] Diastolic (HFpEF), [] Systolic (HFrEF), []Combined (HFpEF and HFrEF), [] RHF, [] Pulm HTN, [] Other    [] TIFFANIE, [] ATN, [] AIN, [] other  [] CKD1, [] CKD2, [] CKD 3, [] CKD 4, [] CKD 5, []ESRD    Encephalopathy: [] Metabolic, [] Hepatic, [] toxic, [] Neurological, [] Other    Abnormal Nurtitional Status: [] malnurtition (see nutrition note), [ ]underweight: BMI < 19, [] morbid obesity: BMI >40, [] Cachexia    [] Sepsis  [] hypovolemic shock,[] cardiogenic shock, [] hemorrhagic shock, [] neuogenic shock  [] Acute Respiratory Failure  []Cerebral edema, [] Brain compression/ herniation,   [] Functional quadriplegia  [] Acute blood loss anemia

## 2019-10-27 NOTE — PROGRESS NOTE ADULT - ASSESSMENT
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 54M c hx vitiligo, and no other PMHx, pw ~4mm R anterior comm artery aneurysm with trace SAH of left sylvian fissure, now s/p WEB embolization.

## 2019-10-27 NOTE — PROGRESS NOTE ADULT - SUBJECTIVE AND OBJECTIVE BOX
HPI:  Patient is a 54 year old male that presented with headaches and dizziness.  Workup revealed subarachnoid hemorrhage.  Admitted for further management.    OVERNIGHT EVENTS:  No acute events overnight.  Patient states he feels well with no current complaints.  Sodium levels were dropping and patient placed on 2%.  Has been out of bed.  Tolerating diet.    Vital Signs Last 24 Hrs  T(C): 36.7 (27 Oct 2019 07:53), Max: 37.6 (26 Oct 2019 20:15)  T(F): 98.1 (27 Oct 2019 07:53), Max: 99.6 (26 Oct 2019 20:15)  HR: 60 (27 Oct 2019 07:53) (60 - 78)  BP: 131/86 (27 Oct 2019 07:53) (109/63 - 147/96)  BP(mean): --  RR: 18 (27 Oct 2019 07:53) (18 - 18)  SpO2: 100% (27 Oct 2019 07:53) (95% - 100%)      PHYSICAL EXAM:  Neurological: awake, alert, oriented x3, follows commands, speech clear and fluent, perrl, eomi, face symmetric, tongue midline, no drift b/l, moves all extremities x4 w/ 5/5 strength throughout, sensation present, intact, equal throughout    Cardiovascular: +s1, s2  Respiratory: clear to auscultation b/l  Gastrointestinal: soft, non-distended, non-tender  Genitourinary: +voiding  Extremities: +dp/pt pulses palpable b/l  Incision/Wound: right groin puncture site c/d/i, no hematoma, no erythema    TUBES/LINES:  [x] none    DIET:  [x] regular, no beef, no pork    LABS:                        16.0   12.35 )-----------( 187      ( 26 Oct 2019 06:56 )             45.3     10-27    133<L>  |  102  |  17  ----------------------------<  125<H>  4.0   |  22  |  0.74    Ca    8.3<L>      27 Oct 2019 07:16  Phos  3.4     10-25  Mg     2.2     10-25        Urinalysis Basic - ( 26 Oct 2019 02:32 )    Color: Light Yellow / Appearance: Clear / S.013 / pH: x  Gluc: x / Ketone: Negative  / Bili: Negative / Urobili: <2 mg/dL   Blood: x / Protein: Negative / Nitrite: Negative   Leuk Esterase: Negative / RBC: x / WBC x   Sq Epi: x / Non Sq Epi: x / Bacteria: x        Allergies    No Known Allergies        MEDICATIONS:  Antibiotics:  none    Neuro:  acetaminophen   Tablet .. 975 milliGRAM(s) Oral every 6 hours PRN  oxyCODONE    IR 5 milliGRAM(s) Oral every 4 hours PRN  oxyCODONE    IR 10 milliGRAM(s) Oral every 4 hours PRN    Anticoagulation:  aspirin enteric coated 325 milliGRAM(s) Oral <User Schedule>  enoxaparin Injectable 40 milliGRAM(s) SubCutaneous <User Schedule>    OTHER:  influenza   Vaccine 0.5 milliLiter(s) IntraMuscular once  niMODipine 30 milliGRAM(s) Oral every 2 hours  polyethylene glycol 3350 17 Gram(s) Oral daily  senna 1 Tablet(s) Oral daily  tamsulosin 0.4 milliGRAM(s) Oral at bedtime    IVF:  sodium chloride 2% . 1000 milliLiter(s) IV Continuous <Continuous>    CULTURES:  none    RADIOLOGY & ADDITIONAL TESTS:  no new imaging

## 2019-10-28 PROBLEM — L80 VITILIGO: Chronic | Status: ACTIVE | Noted: 2019-10-21

## 2019-10-28 LAB
ANION GAP SERPL CALC-SCNC: 10 MMOL/L — SIGNIFICANT CHANGE UP (ref 5–17)
ANION GAP SERPL CALC-SCNC: 11 MMOL/L — SIGNIFICANT CHANGE UP (ref 5–17)
ANION GAP SERPL CALC-SCNC: 16 MMOL/L — SIGNIFICANT CHANGE UP (ref 5–17)
ANION GAP SERPL CALC-SCNC: 9 MMOL/L — SIGNIFICANT CHANGE UP (ref 5–17)
APTT BLD: 25.5 SEC — LOW (ref 27.5–36.3)
BLD GP AB SCN SERPL QL: NEGATIVE — SIGNIFICANT CHANGE UP
BUN SERPL-MCNC: 14 MG/DL — SIGNIFICANT CHANGE UP (ref 7–23)
BUN SERPL-MCNC: 14 MG/DL — SIGNIFICANT CHANGE UP (ref 7–23)
BUN SERPL-MCNC: 15 MG/DL — SIGNIFICANT CHANGE UP (ref 7–23)
BUN SERPL-MCNC: 15 MG/DL — SIGNIFICANT CHANGE UP (ref 7–23)
CALCIUM SERPL-MCNC: 8.2 MG/DL — LOW (ref 8.4–10.5)
CALCIUM SERPL-MCNC: 8.3 MG/DL — LOW (ref 8.4–10.5)
CHLORIDE SERPL-SCNC: 100 MMOL/L — SIGNIFICANT CHANGE UP (ref 96–108)
CHLORIDE SERPL-SCNC: 100 MMOL/L — SIGNIFICANT CHANGE UP (ref 96–108)
CHLORIDE SERPL-SCNC: 103 MMOL/L — SIGNIFICANT CHANGE UP (ref 96–108)
CHLORIDE SERPL-SCNC: 97 MMOL/L — SIGNIFICANT CHANGE UP (ref 96–108)
CO2 SERPL-SCNC: 20 MMOL/L — LOW (ref 22–31)
CO2 SERPL-SCNC: 21 MMOL/L — LOW (ref 22–31)
CO2 SERPL-SCNC: 21 MMOL/L — LOW (ref 22–31)
CO2 SERPL-SCNC: 23 MMOL/L — SIGNIFICANT CHANGE UP (ref 22–31)
CREAT SERPL-MCNC: 0.7 MG/DL — SIGNIFICANT CHANGE UP (ref 0.5–1.3)
CREAT SERPL-MCNC: 0.71 MG/DL — SIGNIFICANT CHANGE UP (ref 0.5–1.3)
CREAT SERPL-MCNC: 0.73 MG/DL — SIGNIFICANT CHANGE UP (ref 0.5–1.3)
CREAT SERPL-MCNC: 0.74 MG/DL — SIGNIFICANT CHANGE UP (ref 0.5–1.3)
GLUCOSE SERPL-MCNC: 128 MG/DL — HIGH (ref 70–99)
GLUCOSE SERPL-MCNC: 138 MG/DL — HIGH (ref 70–99)
GLUCOSE SERPL-MCNC: 149 MG/DL — HIGH (ref 70–99)
GLUCOSE SERPL-MCNC: 150 MG/DL — HIGH (ref 70–99)
HCT VFR BLD CALC: 42.9 % — SIGNIFICANT CHANGE UP (ref 39–50)
HGB BLD-MCNC: 15.2 G/DL — SIGNIFICANT CHANGE UP (ref 13–17)
INR BLD: 1.25 RATIO — HIGH (ref 0.88–1.16)
MCHC RBC-ENTMCNC: 29.9 PG — SIGNIFICANT CHANGE UP (ref 27–34)
MCHC RBC-ENTMCNC: 35.4 GM/DL — SIGNIFICANT CHANGE UP (ref 32–36)
MCV RBC AUTO: 84.3 FL — SIGNIFICANT CHANGE UP (ref 80–100)
NRBC # BLD: 0 /100 WBCS — SIGNIFICANT CHANGE UP (ref 0–0)
PLATELET # BLD AUTO: 194 K/UL — SIGNIFICANT CHANGE UP (ref 150–400)
POTASSIUM SERPL-MCNC: 3.9 MMOL/L — SIGNIFICANT CHANGE UP (ref 3.5–5.3)
POTASSIUM SERPL-MCNC: 4 MMOL/L — SIGNIFICANT CHANGE UP (ref 3.5–5.3)
POTASSIUM SERPL-MCNC: 4.1 MMOL/L — SIGNIFICANT CHANGE UP (ref 3.5–5.3)
POTASSIUM SERPL-MCNC: 4.2 MMOL/L — SIGNIFICANT CHANGE UP (ref 3.5–5.3)
POTASSIUM SERPL-SCNC: 3.9 MMOL/L — SIGNIFICANT CHANGE UP (ref 3.5–5.3)
POTASSIUM SERPL-SCNC: 4 MMOL/L — SIGNIFICANT CHANGE UP (ref 3.5–5.3)
POTASSIUM SERPL-SCNC: 4.1 MMOL/L — SIGNIFICANT CHANGE UP (ref 3.5–5.3)
POTASSIUM SERPL-SCNC: 4.2 MMOL/L — SIGNIFICANT CHANGE UP (ref 3.5–5.3)
PROTHROM AB SERPL-ACNC: 14.5 SEC — HIGH (ref 10–12.9)
RBC # BLD: 5.09 M/UL — SIGNIFICANT CHANGE UP (ref 4.2–5.8)
RBC # FLD: 12.1 % — SIGNIFICANT CHANGE UP (ref 10.3–14.5)
RH IG SCN BLD-IMP: POSITIVE — SIGNIFICANT CHANGE UP
SODIUM SERPL-SCNC: 131 MMOL/L — LOW (ref 135–145)
SODIUM SERPL-SCNC: 132 MMOL/L — LOW (ref 135–145)
SODIUM SERPL-SCNC: 134 MMOL/L — LOW (ref 135–145)
SODIUM SERPL-SCNC: 134 MMOL/L — LOW (ref 135–145)
WBC # BLD: 11.94 K/UL — HIGH (ref 3.8–10.5)
WBC # FLD AUTO: 11.94 K/UL — HIGH (ref 3.8–10.5)

## 2019-10-28 PROCEDURE — 99232 SBSQ HOSP IP/OBS MODERATE 35: CPT

## 2019-10-28 PROCEDURE — 93888 INTRACRANIAL LIMITED STUDY: CPT | Mod: 26

## 2019-10-28 RX ORDER — POTASSIUM CHLORIDE 20 MEQ
10 PACKET (EA) ORAL ONCE
Refills: 0 | Status: COMPLETED | OUTPATIENT
Start: 2019-10-28 | End: 2019-10-28

## 2019-10-28 RX ORDER — SODIUM CHLORIDE 9 MG/ML
2 INJECTION INTRAMUSCULAR; INTRAVENOUS; SUBCUTANEOUS EVERY 8 HOURS
Refills: 0 | Status: DISCONTINUED | OUTPATIENT
Start: 2019-10-28 | End: 2019-10-30

## 2019-10-28 RX ORDER — SODIUM CHLORIDE 5 G/100ML
1000 INJECTION, SOLUTION INTRAVENOUS
Refills: 0 | Status: DISCONTINUED | OUTPATIENT
Start: 2019-10-28 | End: 2019-10-29

## 2019-10-28 RX ORDER — ONDANSETRON 8 MG/1
4 TABLET, FILM COATED ORAL ONCE
Refills: 0 | Status: COMPLETED | OUTPATIENT
Start: 2019-10-28 | End: 2019-10-28

## 2019-10-28 RX ORDER — ACETAMINOPHEN 500 MG
1000 TABLET ORAL ONCE
Refills: 0 | Status: COMPLETED | OUTPATIENT
Start: 2019-10-28 | End: 2019-10-28

## 2019-10-28 RX ADMIN — Medication 400 MILLIGRAM(S): at 19:34

## 2019-10-28 RX ADMIN — TAMSULOSIN HYDROCHLORIDE 0.4 MILLIGRAM(S): 0.4 CAPSULE ORAL at 00:01

## 2019-10-28 RX ADMIN — ONDANSETRON 4 MILLIGRAM(S): 8 TABLET, FILM COATED ORAL at 08:25

## 2019-10-28 RX ADMIN — OXYCODONE HYDROCHLORIDE 10 MILLIGRAM(S): 5 TABLET ORAL at 04:00

## 2019-10-28 RX ADMIN — NIMODIPINE 30 MILLIGRAM(S): 60 SOLUTION ORAL at 05:48

## 2019-10-28 RX ADMIN — ENOXAPARIN SODIUM 40 MILLIGRAM(S): 100 INJECTION SUBCUTANEOUS at 17:29

## 2019-10-28 RX ADMIN — NIMODIPINE 30 MILLIGRAM(S): 60 SOLUTION ORAL at 17:29

## 2019-10-28 RX ADMIN — POLYETHYLENE GLYCOL 3350 17 GRAM(S): 17 POWDER, FOR SOLUTION ORAL at 13:07

## 2019-10-28 RX ADMIN — NIMODIPINE 30 MILLIGRAM(S): 60 SOLUTION ORAL at 03:33

## 2019-10-28 RX ADMIN — OXYCODONE HYDROCHLORIDE 10 MILLIGRAM(S): 5 TABLET ORAL at 07:30

## 2019-10-28 RX ADMIN — NIMODIPINE 30 MILLIGRAM(S): 60 SOLUTION ORAL at 11:39

## 2019-10-28 RX ADMIN — SODIUM CHLORIDE 1 GRAM(S): 9 INJECTION INTRAMUSCULAR; INTRAVENOUS; SUBCUTANEOUS at 05:48

## 2019-10-28 RX ADMIN — OXYCODONE HYDROCHLORIDE 10 MILLIGRAM(S): 5 TABLET ORAL at 11:55

## 2019-10-28 RX ADMIN — SODIUM CHLORIDE 75 MILLILITER(S): 5 INJECTION, SOLUTION INTRAVENOUS at 11:03

## 2019-10-28 RX ADMIN — OXYCODONE HYDROCHLORIDE 10 MILLIGRAM(S): 5 TABLET ORAL at 03:29

## 2019-10-28 RX ADMIN — Medication 1000 MILLIGRAM(S): at 20:00

## 2019-10-28 RX ADMIN — SODIUM CHLORIDE 2 GRAM(S): 9 INJECTION INTRAMUSCULAR; INTRAVENOUS; SUBCUTANEOUS at 13:07

## 2019-10-28 RX ADMIN — OXYCODONE HYDROCHLORIDE 10 MILLIGRAM(S): 5 TABLET ORAL at 12:30

## 2019-10-28 RX ADMIN — NIMODIPINE 30 MILLIGRAM(S): 60 SOLUTION ORAL at 20:34

## 2019-10-28 RX ADMIN — Medication 10 MILLIEQUIVALENT(S): at 17:41

## 2019-10-28 RX ADMIN — TAMSULOSIN HYDROCHLORIDE 0.4 MILLIGRAM(S): 0.4 CAPSULE ORAL at 22:38

## 2019-10-28 RX ADMIN — SODIUM CHLORIDE 1 GRAM(S): 9 INJECTION INTRAMUSCULAR; INTRAVENOUS; SUBCUTANEOUS at 00:01

## 2019-10-28 RX ADMIN — SODIUM CHLORIDE 2 GRAM(S): 9 INJECTION INTRAMUSCULAR; INTRAVENOUS; SUBCUTANEOUS at 22:39

## 2019-10-28 RX ADMIN — NIMODIPINE 30 MILLIGRAM(S): 60 SOLUTION ORAL at 13:07

## 2019-10-28 RX ADMIN — Medication 325 MILLIGRAM(S): at 05:48

## 2019-10-28 RX ADMIN — NIMODIPINE 30 MILLIGRAM(S): 60 SOLUTION ORAL at 08:26

## 2019-10-28 RX ADMIN — OXYCODONE HYDROCHLORIDE 10 MILLIGRAM(S): 5 TABLET ORAL at 08:00

## 2019-10-28 NOTE — PROGRESS NOTE ADULT - PROBLEM SELECTOR PLAN 1
- cont neuro check per nsgy  - care as per Neuro surgery team   - cont nimodipine for vasospasm as per NeuroSX   - pt is on  for web  - pharmacologic DVT ppx as per NSG, currently on lovenox 40.

## 2019-10-28 NOTE — PROGRESS NOTE ADULT - SUBJECTIVE AND OBJECTIVE BOX
[facility] Division of Hospital Medicine  Saturnino Conley MD  Pager (M-F, 9I-9L): [phone]  Other Times:  [phone]    Patient is a 54y old  Male who presents with a chief complaint of JOSH R christineomm (28 Oct 2019 08:44)    SUBJECTIVE / OVERNIGHT EVENTS: no new complaints. is hungry. HA improving. no vision changes.   ADDITIONAL REVIEW OF SYSTEMS:    MEDICATIONS  (STANDING):  aspirin enteric coated 325 milliGRAM(s) Oral <User Schedule>  enoxaparin Injectable 40 milliGRAM(s) SubCutaneous <User Schedule>  influenza   Vaccine 0.5 milliLiter(s) IntraMuscular once  niMODipine 30 milliGRAM(s) Oral every 2 hours  polyethylene glycol 3350 17 Gram(s) Oral daily  senna 1 Tablet(s) Oral daily  sodium chloride 2 Gram(s) Oral every 8 hours  sodium chloride 2% . 1000 milliLiter(s) (75 mL/Hr) IV Continuous <Continuous>  tamsulosin 0.4 milliGRAM(s) Oral at bedtime    MEDICATIONS  (PRN):  oxyCODONE    IR 5 milliGRAM(s) Oral every 4 hours PRN Moderate Pain (4 - 6)  oxyCODONE    IR 10 milliGRAM(s) Oral every 4 hours PRN Severe Pain (7 - 10)      CAPILLARY BLOOD GLUCOSE        I&O's Summary    27 Oct 2019 07:01  -  28 Oct 2019 07:00  --------------------------------------------------------  IN: 240 mL / OUT: 1000 mL / NET: -760 mL        PHYSICAL EXAM:  Vital Signs Last 24 Hrs  T(C): 37.2 (28 Oct 2019 12:47), Max: 37.6 (27 Oct 2019 23:23)  T(F): 98.9 (28 Oct 2019 12:47), Max: 99.7 (27 Oct 2019 23:23)  HR: 66 (28 Oct 2019 12:47) (56 - 83)  BP: 138/84 (28 Oct 2019 12:47) (118/74 - 152/88)  BP(mean): --  RR: 18 (28 Oct 2019 12:47) (18 - 18)  SpO2: 97% (28 Oct 2019 12:47) (95% - 98%)    GENERAL: Alert. Not confused. No acute distress. Not thin. Not cachectic. Not obese.  CARDIAC: RRR  LUNG/CHEST: CTAB. BS equal bilaterally. No wheezes. No rales. No rhonchi.  ABDOMEN: Soft. No tenderness. No distension. No fluid wave. Normal bowel sounds.  EXTREMITIES:  No clubbing. No cyanosis. No edema. Moving all 4.  NEUROLOGY: A&Ox3. Non-focal exam.   SKIN: vitiligo skin changes    LABS:                        15.2   11.94 )-----------( 194      ( 28 Oct 2019 09:06 )             42.9     10-28    131<L>  |  100  |  14  ----------------------------<  150<H>  4.0   |  20<L>  |  0.70    Ca    8.3<L>      28 Oct 2019 09:07      PT/INR - ( 28 Oct 2019 09:06 )   PT: 14.5 sec;   INR: 1.25 ratio         PTT - ( 28 Oct 2019 09:06 )  PTT:25.5 sec      RADIOLOGY & ADDITIONAL TESTS:  Results Reviewed:   Imaging Personally Reviewed:  Electrocardiogram Personally Reviewed:    COORDINATION OF CARE:  Care Discussed with Consultants/Other Providers [Y/N]:  Prior or Outpatient Records Reviewed [Y/N]:

## 2019-10-28 NOTE — PROGRESS NOTE ADULT - PROBLEM SELECTOR PLAN 4
On  nimodipine 30 q2.   cont to monitor BP  transition to Norvasc 5mg when nimodipine weaned  needs PMD - refer to [address] [phone]

## 2019-10-28 NOTE — PROGRESS NOTE ADULT - ASSESSMENT
ASSESSMENT AND PLAN: 54M, no significant medical hx, p/w WHOL that began a week prior admission, found to have SAH HH1, MF1, s/p angio WEB for acomm aneurysm 10/22    NEURO:   - Continue Neuro checks q 4  - Possible angio this morning with Dr. Jiménez - made patient NPO except medications.  - Continue nimodipine for vasospasm ppx  - TCDs in am  - Continue ASA for WEB for acomm aneurysm  - Continue pain control w/ meds (tylenol prn, oxy 5/10 prn)    PULM:   - Encourage incentive spirometry    CV:  - -180    ENDO:   - Euglycemia    HEME/ONC: 10/26 CBC stable, has CBC in am, to follow                      DVT ppx: SCDs, SQL     RENAL:   - Continue 2%NACL and salt tabs  - Continue BMP q 6    ID:   - Afebrile    GI:    - NPO except meds  - Continue BM meds    DISCHARGE PLANNING:   PT/OT home w/ no needs    Assessment:  Please Check When Present   []  GCS  E   V  M     Heart Failure: []Acute, [] acute on chronic , []chronic  Heart Failure:  [] Diastolic (HFpEF), [] Systolic (HFrEF), []Combined (HFpEF and HFrEF), [] RHF, [] Pulm HTN, [] Other    [] TIFFANIE, [] ATN, [] AIN, [] other  [] CKD1, [] CKD2, [] CKD 3, [] CKD 4, [] CKD 5, []ESRD    Encephalopathy: [] Metabolic, [] Hepatic, [] toxic, [] Neurological, [] Other    Abnormal Nurtitional Status: [] malnutrition (see nutrition note), [ ]underweight: BMI < 19, [] morbid obesity: BMI >40, [] Cachexia    [] Sepsis  [] hypovolemic shock,[] cardiogenic shock, [] hemorrhagic shock, [] neuogenic shock  [] Acute Respiratory Failure  []Cerebral edema, [] Brain compression/ herniation,   [] Functional quadriplegia  [] Acute blood loss anemia    #[id number]  To be discussed w/ Dr. Jiménez ASSESSMENT AND PLAN: 54M, no significant medical hx, p/w WHOL that began a week prior admission, found to have SAH HH1, MF1, s/p angio WEB for acomm aneurysm 10/22    NEURO:   - Continue Neuro checks q 4  - Possible angio this morning with Dr. Jiménez - made patient NPO except medications.  - Continue nimodipine for vasospasm ppx  - TCDs in am  - Continue ASA for WEB for acomm aneurysm  - Continue pain control w/ meds (tylenol prn, oxy 5/10 prn)    Addendum:   TCDs today were less than 58, no angio today per Nsx resident  PULM:   - Encourage incentive spirometry    CV:  - -180    ENDO:   - Euglycemia    HEME/ONC: 10/26 CBC stable, has CBC in am, to follow                      DVT ppx: SCDs, SQL     RENAL:   - Continue 2%NACL and salt tabs  - Continue BMP q 6    ID:   - Afebrile    GI:    - NPO except meds  - Continue BM meds    DISCHARGE PLANNING:   PT/OT home w/ no needs    Assessment:  Please Check When Present   []  GCS  E   V  M     Heart Failure: []Acute, [] acute on chronic , []chronic  Heart Failure:  [] Diastolic (HFpEF), [] Systolic (HFrEF), []Combined (HFpEF and HFrEF), [] RHF, [] Pulm HTN, [] Other    [] TIFFANIE, [] ATN, [] AIN, [] other  [] CKD1, [] CKD2, [] CKD 3, [] CKD 4, [] CKD 5, []ESRD    Encephalopathy: [] Metabolic, [] Hepatic, [] toxic, [] Neurological, [] Other    Abnormal Nurtitional Status: [] malnutrition (see nutrition note), [ ]underweight: BMI < 19, [] morbid obesity: BMI >40, [] Cachexia    [] Sepsis  [] hypovolemic shock,[] cardiogenic shock, [] hemorrhagic shock, [] neuogenic shock  [] Acute Respiratory Failure  []Cerebral edema, [] Brain compression/ herniation,   [] Functional quadriplegia  [] Acute blood loss anemia    #[id number]  To be discussed w/ Dr. Jiménez ASSESSMENT AND PLAN: 54M, no significant medical hx, p/w WHOL that began a week prior admission, found to have SAH HH1, MF1, s/p angio WEB for acomm aneurysm 10/22    NEURO:   - Continue Neuro checks q 4  - Possible angio this morning with Dr. Jiménez - made patient NPO except medications.  - Continue nimodipine for vasospasm ppx  - TCDs in am  - Continue ASA for WEB for acomm aneurysm  - Continue pain control w/ meds (tylenol prn, oxy 5/10 prn)    Addendum:   - TCDs today were less than 68, no angio today per Nsx resident  - MRI/MRA in am  PULM:   - Encourage incentive spirometry    CV:  - -180    ENDO:   - Euglycemia    HEME/ONC: 10/26 CBC stable, has CBC in am, to follow                      DVT ppx: SCDs, SQL     RENAL:   - Continue 2%NACL and salt tabs  - Continue BMP q 6    ID:   - Afebrile    GI:    - NPO except meds  - Continue BM meds    DISCHARGE PLANNING:   PT/OT home w/ no needs    Assessment:  Please Check When Present   []  GCS  E   V  M     Heart Failure: []Acute, [] acute on chronic , []chronic  Heart Failure:  [] Diastolic (HFpEF), [] Systolic (HFrEF), []Combined (HFpEF and HFrEF), [] RHF, [] Pulm HTN, [] Other    [] TIFFANIE, [] ATN, [] AIN, [] other  [] CKD1, [] CKD2, [] CKD 3, [] CKD 4, [] CKD 5, []ESRD    Encephalopathy: [] Metabolic, [] Hepatic, [] toxic, [] Neurological, [] Other    Abnormal Nurtitional Status: [] malnutrition (see nutrition note), [ ]underweight: BMI < 19, [] morbid obesity: BMI >40, [] Cachexia    [] Sepsis  [] hypovolemic shock,[] cardiogenic shock, [] hemorrhagic shock, [] neuogenic shock  [] Acute Respiratory Failure  []Cerebral edema, [] Brain compression/ herniation,   [] Functional quadriplegia  [] Acute blood loss anemia    #[id number]  To be discussed w/ Dr. Jiménez

## 2019-10-28 NOTE — CHART NOTE - NSCHARTNOTEFT_GEN_A_CORE
Transcranial doppler exam #1 (10/24/19) 12pm  mean velocity  cm/sec                              Left             Right  MELISSA                 29(technical)    69  MCA                56                  57  PCA                  23,22            30,28  VERT                  30                34  BA                                39    Official report to follow.  SMarge    Transcranial doppler exam #2 (10/25/19) 1030am  mean velocity  cm/sec                              Left         Right  MELISSA                    36           59  MCA                    53           51  PCA                    28,19       25,24  Official report to follow.  SMarge    Transcranial doppler exam #3 (10/26/19) 1115am  mean velocity  cm/sec                              Left         Right  MELISSA                    47           43  MCA                   51            54  Official report to follow.  SMarge    Transcranial doppler exam #4 (10/28/19) 11am  mean velocity  cm/sec                              Left         Right  MELISSA                    48           69  MCA                   51           53  PCA                     P2-19      28,27   Official report to follow.  SMarge

## 2019-10-28 NOTE — PROGRESS NOTE ADULT - ASSESSMENT
54M c hx vitiligo, and no other PMHx, pw ~4mm R anterior comm artery aneurysm with trace SAH of left sylvian fissure, now s/p WEB embolization.

## 2019-10-29 LAB
ANION GAP SERPL CALC-SCNC: 11 MMOL/L — SIGNIFICANT CHANGE UP (ref 5–17)
ANION GAP SERPL CALC-SCNC: 6 MMOL/L — SIGNIFICANT CHANGE UP (ref 5–17)
ANION GAP SERPL CALC-SCNC: 9 MMOL/L — SIGNIFICANT CHANGE UP (ref 5–17)
ANION GAP SERPL CALC-SCNC: 9 MMOL/L — SIGNIFICANT CHANGE UP (ref 5–17)
BUN SERPL-MCNC: 14 MG/DL — SIGNIFICANT CHANGE UP (ref 7–23)
BUN SERPL-MCNC: 15 MG/DL — SIGNIFICANT CHANGE UP (ref 7–23)
CALCIUM SERPL-MCNC: 8.2 MG/DL — LOW (ref 8.4–10.5)
CALCIUM SERPL-MCNC: 8.2 MG/DL — LOW (ref 8.4–10.5)
CALCIUM SERPL-MCNC: 8.4 MG/DL — SIGNIFICANT CHANGE UP (ref 8.4–10.5)
CALCIUM SERPL-MCNC: 8.4 MG/DL — SIGNIFICANT CHANGE UP (ref 8.4–10.5)
CHLORIDE SERPL-SCNC: 103 MMOL/L — SIGNIFICANT CHANGE UP (ref 96–108)
CHLORIDE SERPL-SCNC: 104 MMOL/L — SIGNIFICANT CHANGE UP (ref 96–108)
CHLORIDE SERPL-SCNC: 106 MMOL/L — SIGNIFICANT CHANGE UP (ref 96–108)
CHLORIDE SERPL-SCNC: 107 MMOL/L — SIGNIFICANT CHANGE UP (ref 96–108)
CO2 SERPL-SCNC: 22 MMOL/L — SIGNIFICANT CHANGE UP (ref 22–31)
CO2 SERPL-SCNC: 22 MMOL/L — SIGNIFICANT CHANGE UP (ref 22–31)
CO2 SERPL-SCNC: 23 MMOL/L — SIGNIFICANT CHANGE UP (ref 22–31)
CO2 SERPL-SCNC: 26 MMOL/L — SIGNIFICANT CHANGE UP (ref 22–31)
CREAT SERPL-MCNC: 0.79 MG/DL — SIGNIFICANT CHANGE UP (ref 0.5–1.3)
CREAT SERPL-MCNC: 0.8 MG/DL — SIGNIFICANT CHANGE UP (ref 0.5–1.3)
CREAT SERPL-MCNC: 0.81 MG/DL — SIGNIFICANT CHANGE UP (ref 0.5–1.3)
CREAT SERPL-MCNC: 0.89 MG/DL — SIGNIFICANT CHANGE UP (ref 0.5–1.3)
GLUCOSE SERPL-MCNC: 110 MG/DL — HIGH (ref 70–99)
GLUCOSE SERPL-MCNC: 114 MG/DL — HIGH (ref 70–99)
GLUCOSE SERPL-MCNC: 123 MG/DL — HIGH (ref 70–99)
GLUCOSE SERPL-MCNC: 78 MG/DL — SIGNIFICANT CHANGE UP (ref 70–99)
POTASSIUM SERPL-MCNC: 3.7 MMOL/L — SIGNIFICANT CHANGE UP (ref 3.5–5.3)
POTASSIUM SERPL-MCNC: 4.1 MMOL/L — SIGNIFICANT CHANGE UP (ref 3.5–5.3)
POTASSIUM SERPL-MCNC: 4.1 MMOL/L — SIGNIFICANT CHANGE UP (ref 3.5–5.3)
POTASSIUM SERPL-MCNC: 4.2 MMOL/L — SIGNIFICANT CHANGE UP (ref 3.5–5.3)
POTASSIUM SERPL-SCNC: 3.7 MMOL/L — SIGNIFICANT CHANGE UP (ref 3.5–5.3)
POTASSIUM SERPL-SCNC: 4.1 MMOL/L — SIGNIFICANT CHANGE UP (ref 3.5–5.3)
POTASSIUM SERPL-SCNC: 4.1 MMOL/L — SIGNIFICANT CHANGE UP (ref 3.5–5.3)
POTASSIUM SERPL-SCNC: 4.2 MMOL/L — SIGNIFICANT CHANGE UP (ref 3.5–5.3)
SODIUM SERPL-SCNC: 135 MMOL/L — SIGNIFICANT CHANGE UP (ref 135–145)
SODIUM SERPL-SCNC: 135 MMOL/L — SIGNIFICANT CHANGE UP (ref 135–145)
SODIUM SERPL-SCNC: 138 MMOL/L — SIGNIFICANT CHANGE UP (ref 135–145)
SODIUM SERPL-SCNC: 140 MMOL/L — SIGNIFICANT CHANGE UP (ref 135–145)

## 2019-10-29 PROCEDURE — 93888 INTRACRANIAL LIMITED STUDY: CPT | Mod: 26

## 2019-10-29 PROCEDURE — 99232 SBSQ HOSP IP/OBS MODERATE 35: CPT

## 2019-10-29 PROCEDURE — 99231 SBSQ HOSP IP/OBS SF/LOW 25: CPT

## 2019-10-29 RX ORDER — ACETAMINOPHEN 500 MG
1000 TABLET ORAL ONCE
Refills: 0 | Status: COMPLETED | OUTPATIENT
Start: 2019-10-29 | End: 2019-10-29

## 2019-10-29 RX ORDER — POTASSIUM CHLORIDE 20 MEQ
20 PACKET (EA) ORAL ONCE
Refills: 0 | Status: COMPLETED | OUTPATIENT
Start: 2019-10-29 | End: 2019-10-29

## 2019-10-29 RX ADMIN — SODIUM CHLORIDE 75 MILLILITER(S): 5 INJECTION, SOLUTION INTRAVENOUS at 11:56

## 2019-10-29 RX ADMIN — NIMODIPINE 30 MILLIGRAM(S): 60 SOLUTION ORAL at 02:25

## 2019-10-29 RX ADMIN — Medication 325 MILLIGRAM(S): at 06:34

## 2019-10-29 RX ADMIN — OXYCODONE HYDROCHLORIDE 10 MILLIGRAM(S): 5 TABLET ORAL at 08:18

## 2019-10-29 RX ADMIN — OXYCODONE HYDROCHLORIDE 5 MILLIGRAM(S): 5 TABLET ORAL at 20:02

## 2019-10-29 RX ADMIN — NIMODIPINE 30 MILLIGRAM(S): 60 SOLUTION ORAL at 13:39

## 2019-10-29 RX ADMIN — OXYCODONE HYDROCHLORIDE 5 MILLIGRAM(S): 5 TABLET ORAL at 19:32

## 2019-10-29 RX ADMIN — Medication 20 MILLIEQUIVALENT(S): at 17:52

## 2019-10-29 RX ADMIN — SODIUM CHLORIDE 2 GRAM(S): 9 INJECTION INTRAMUSCULAR; INTRAVENOUS; SUBCUTANEOUS at 13:37

## 2019-10-29 RX ADMIN — SODIUM CHLORIDE 2 GRAM(S): 9 INJECTION INTRAMUSCULAR; INTRAVENOUS; SUBCUTANEOUS at 21:43

## 2019-10-29 RX ADMIN — NIMODIPINE 30 MILLIGRAM(S): 60 SOLUTION ORAL at 21:42

## 2019-10-29 RX ADMIN — Medication 1000 MILLIGRAM(S): at 15:00

## 2019-10-29 RX ADMIN — NIMODIPINE 30 MILLIGRAM(S): 60 SOLUTION ORAL at 17:50

## 2019-10-29 RX ADMIN — TAMSULOSIN HYDROCHLORIDE 0.4 MILLIGRAM(S): 0.4 CAPSULE ORAL at 21:43

## 2019-10-29 RX ADMIN — OXYCODONE HYDROCHLORIDE 10 MILLIGRAM(S): 5 TABLET ORAL at 09:00

## 2019-10-29 RX ADMIN — Medication 1000 MILLIGRAM(S): at 23:48

## 2019-10-29 RX ADMIN — Medication 400 MILLIGRAM(S): at 02:25

## 2019-10-29 RX ADMIN — SODIUM CHLORIDE 2 GRAM(S): 9 INJECTION INTRAMUSCULAR; INTRAVENOUS; SUBCUTANEOUS at 06:34

## 2019-10-29 RX ADMIN — NIMODIPINE 30 MILLIGRAM(S): 60 SOLUTION ORAL at 23:30

## 2019-10-29 RX ADMIN — ENOXAPARIN SODIUM 40 MILLIGRAM(S): 100 INJECTION SUBCUTANEOUS at 17:51

## 2019-10-29 RX ADMIN — NIMODIPINE 30 MILLIGRAM(S): 60 SOLUTION ORAL at 16:05

## 2019-10-29 RX ADMIN — Medication 400 MILLIGRAM(S): at 14:30

## 2019-10-29 RX ADMIN — Medication 400 MILLIGRAM(S): at 23:18

## 2019-10-29 RX ADMIN — Medication 1000 MILLIGRAM(S): at 02:55

## 2019-10-29 NOTE — CHART NOTE - NSCHARTNOTEFT_GEN_A_CORE
Nutrition Follow-up Note.    Pt seen for malnutrition follow-up. Per chart: "Pt is a 53 yo M with no known PMH (has not seen physician in decades). Presented with HA and dizziness, initiating last Monday 10/14. Noted with HH1 mF1 SAH s/p Pitts embolization of ruptured R ACOMM aneurysm (10/22)."    Source: Patient [x ]    Family [x ]     other [ x] EMR    Diet: Regular diet. No beef/no pork.     GI: No N+V, diarrhea/constipation - Pt notes he had a BM today.      Pt notes he is feeling significantly better today and notes he ate 60% more today than he was consuming the last few days. Pt note his appetite is starting to come back more. Still declines nutrition supplement.    Pt expressed to RD he had hyponatremia which is slowly improving. Discussed sodium content of foods.     Current Weight: No additional weights noted per chart. Will continue to monitor and trend weight status.  Daily Weight in k.1 (10-), Weight in k.8 (10-22)  % Weight Change: Wt stable.     Pertinent Medications: MEDICATIONS  (STANDING):  aspirin enteric coated 325 milliGRAM(s) Oral <User Schedule>  enoxaparin Injectable 40 milliGRAM(s) SubCutaneous <User Schedule>  influenza   Vaccine 0.5 milliLiter(s) IntraMuscular once  niMODipine 30 milliGRAM(s) Oral every 2 hours  polyethylene glycol 3350 17 Gram(s) Oral daily  potassium chloride    Tablet ER 20 milliEquivalent(s) Oral once  senna 1 Tablet(s) Oral daily  sodium chloride 2 Gram(s) Oral every 8 hours  sodium chloride 2% . 1000 milliLiter(s) (75 mL/Hr) IV Continuous <Continuous>  tamsulosin 0.4 milliGRAM(s) Oral at bedtime    MEDICATIONS  (PRN):  oxyCODONE    IR 5 milliGRAM(s) Oral every 4 hours PRN Moderate Pain (4 - 6)  oxyCODONE    IR 10 milliGRAM(s) Oral every 4 hours PRN Severe Pain (7 - 10)    Pertinent Labs:  10- Na140 mmol/L Glu 78 mg/dL K+ 3.7 mmol/L Cr  0.80 mg/dL BUN 14 mg/dL 10-25 Phos 3.4 mg/dL 10-21 [id number] 5.7 %<H> 10-22 Chol 151 mg/dL LDL 93 mg/dL HDL 42 mg/dL Trig 81 mg/dL    Skin per nursing documentation: surgical incision s/p angio  Edema: no edema noted    Estimated Needs:   [x] no change since previous assessment  [ ] recalculated:     Previous Nutrition Diagnosis: Moderate protein-calorie malnutrition  Nutrition Diagnosis is: ongoing - improving PO - nutrition care plan achieved           New Nutrition Diagnosis: [x ] not applicable       Interventions:   1. Continue diet as ordered (regular, no beef/pork).   2. Discussion had on sodium contents of foods - pt with hyponatremia  3. Encourage and monitor PO intake. Encourage use of daily menus to promote optimal PO intake potential.   4. Monitor PO intake/tolerance, weights, labs, hydration status, bowels, and skin integrity.     RD remains available   Chelsea Mitchell MS, RD  pager #[phone]

## 2019-10-29 NOTE — PROGRESS NOTE ADULT - ASSESSMENT
HPI:  54M with no known PMH but has not seen a physician in decades presents with HA and dizziness. Last Mon afternoon started with migrating HA and neck pain. Friday was doing some chores outside and around 10pm noted return of headache, mostly on the top of the head. The pain kept increasing overnight. Couldn't sleep. Pain was the worst he could recall in recent memory. Endorses nausea but no vomiting. Dizziness is positional and worse when he bends over or stands up quickly. Denies neck pain, chest pain, light sensitivity, chest pain, sob, abd pain, diarrhea, rash. Pt endorsed some chills at home but no fever. Denies recent trauma. Did fall and hit his head two years ago, and didn't seek treatment. (21 Oct 2019 17:54)    PROCEDURE:  s/p cerebral angiogram and web placement for right anterior communicating artery aneurysm on 10/22   POD#    PLAN:  1 Out of bed   2 continue physical therapy   3 TCD in am   4 prn pain meds   5 hyponatremia on 2%, salt tab and 1 litre free water restriction-improved   6 continue nimodipine   7 regular diet   8 stool softeners   9 dispo :p     Assessment:  Please Check When Present   []  GCS  E   V  M     Heart Failure: []Acute, [] acute on chronic , []chronic  Heart Failure:  [] Diastolic (HFpEF), [] Systolic (HFrEF), []Combined (HFpEF and HFrEF), [] RHF, [] Pulm HTN, [] Other    [] TIFFANIE, [] ATN, [] AIN, [] other  [] CKD1, [] CKD2, [] CKD 3, [] CKD 4, [] CKD 5, []ESRD    Encephalopathy: [] Metabolic, [] Hepatic, [] toxic, [] Neurological, [] Other    Abnormal Nurtitional Status: [] malnurtition (see nutrition note), [ ]underweight: BMI < 19, [] morbid obesity: BMI >40, [] Cachexia    [] Sepsis  [] hypovolemic shock,[] cardiogenic shock, [] hemorrhagic shock, [] neuogenic shock  [] Acute Respiratory Failure  []Cerebral edema, [] Brain compression/ herniation,   [] Functional quadriplegia  [] Acute blood loss anemia HPI:  54M with no known PMH but has not seen a physician in decades presents with HA and dizziness. Last Mon afternoon started with migrating HA and neck pain. Friday was doing some chores outside and around 10pm noted return of headache, mostly on the top of the head. The pain kept increasing overnight. Couldn't sleep. Pain was the worst he could recall in recent memory. Endorses nausea but no vomiting. Dizziness is positional and worse when he bends over or stands up quickly. Denies neck pain, chest pain, light sensitivity, chest pain, sob, abd pain, diarrhea, rash. Pt endorsed some chills at home but no fever. Denies recent trauma. Did fall and hit his head two years ago, and didn't seek treatment. (21 Oct 2019 17:54)    PROCEDURE:  s/p cerebral angiogram and web placement for right anterior communicating artery aneurysm on 10/22   POD#    PLAN:  1 Out of bed   2 continue physical therapy   3 TCD in am   4 prn pain meds   5 hyponatremia on 2%, salt tab and 1 litre free water restriction-improved   6 continue nimodipine   7 regular diet   8 stool softeners   9 dispo :p     Assessment:  Please Check When Present   []  GCS  E   V  M     Heart Failure: []Acute, [] acute on chronic , []chronic  Heart Failure:  [] Diastolic (HFpEF), [] Systolic (HFrEF), []Combined (HFpEF and HFrEF), [] RHF, [] Pulm HTN, [] Other    [] TIFFANIE, [] ATN, [] AIN, [] other  [] CKD1, [] CKD2, [] CKD 3, [] CKD 4, [] CKD 5, []ESRD    Encephalopathy: [] Metabolic, [] Hepatic, [] toxic, [] Neurological, [] Other    Abnormal Nurtitional Status: [] malnurtition (see nutrition note), [ ]underweight: BMI < 19, [] morbid obesity: BMI >40, [] Cachexia    [] Sepsis  [] hypovolemic shock,[] cardiogenic shock, [] hemorrhagic shock, [] neuogenic shock  [] Acute Respiratory Failure  []Cerebral edema, [] Brain compression/ herniation,   [] Functional quadriplegia  [] Acute blood loss anemia  More than 40 minutes were spent educating the patient and family regarding condition, medications, follow up plans, signs and symptoms to be concerned with, preparing paperwork, and questions answered regarding discharge.

## 2019-10-29 NOTE — PROGRESS NOTE ADULT - SUBJECTIVE AND OBJECTIVE BOX
SUBJECTIVE:   Patient was seen and evaluated at bedside. Patient is resting in bed and is in no new acute distress.   OVERNIGHT EVENTS:   none   Vital Signs Last 24 Hrs  T(C): 36.9 (29 Oct 2019 07:39), Max: 37.6 (28 Oct 2019 15:35)  T(F): 98.5 (29 Oct 2019 07:39), Max: 99.6 (28 Oct 2019 15:35)  HR: 56 (29 Oct 2019 07:39) (56 - 73)  BP: 138/81 (29 Oct 2019 07:39) (110/72 - 144/97)  BP(mean): --  RR: 18 (29 Oct 2019 07:39) (18 - 18)  SpO2: 98% (29 Oct 2019 07:39) (96% - 99%)    PHYSICAL EXAM:    General: No Acute Distress     Neurological: Awake, alert oriented to person, place and time, Following Commands, PERRL, EOMI, Face Symmetrical, Speech Fluent, Moving all extremities, Muscle Strength normal in all four extremities, No Drift, Sensation to Light Touch Intact    Pulmonary: Clear to Auscultation, No Rales, No Rhonchi, No Wheezes     Cardiovascular: S1, S2, Regular Rate and Rhythm     Gastrointestinal: Soft, Nontender, Nondistended     Incision:     LABS:                        15.2   11.94 )-----------( 194      ( 28 Oct 2019 09:06 )             42.9    10-29    135  |  103  |  15  ----------------------------<  110<H>  4.1   |  23  |  0.89    Ca    8.2<L>      29 Oct 2019 07:54    PT/INR - ( 28 Oct 2019 09:06 )   PT: 14.5 sec;   INR: 1.25 ratio         PTT - ( 28 Oct 2019 09:06 )  PTT:25.5 sec  Hemoglobin A1C, Whole Blood: 5.7 % (10-21 @ 22:34)      10-28 @ 07:01  -  10-29 @ 07:00  --------------------------------------------------------  IN: 960 mL / OUT: 750 mL / NET: 210 mL    10-29 @ 07:01  -  10-29 @ 12:17  --------------------------------------------------------  IN: 375 mL / OUT: 0 mL / NET: 375 mL      DRAINS:     MEDICATIONS:  Antibiotics:    Neuro:  oxyCODONE    IR 5 milliGRAM(s) Oral every 4 hours PRN Moderate Pain (4 - 6)  oxyCODONE    IR 10 milliGRAM(s) Oral every 4 hours PRN Severe Pain (7 - 10)    Cardiac:  niMODipine 30 milliGRAM(s) Oral every 2 hours  tamsulosin 0.4 milliGRAM(s) Oral at bedtime    Pulm:    GI/:  polyethylene glycol 3350 17 Gram(s) Oral daily  senna 1 Tablet(s) Oral daily    Other:   aspirin enteric coated 325 milliGRAM(s) Oral <User Schedule>  enoxaparin Injectable 40 milliGRAM(s) SubCutaneous <User Schedule>  influenza   Vaccine 0.5 milliLiter(s) IntraMuscular once  sodium chloride 2 Gram(s) Oral every 8 hours  sodium chloride 2% . 1000 milliLiter(s) IV Continuous <Continuous>    DIET: [] Regular [] CCD [] Renal [] Puree [] Dysphagia [] Tube Feeds: regular     IMAGING:   mri mra brain :p

## 2019-10-29 NOTE — PROGRESS NOTE ADULT - PROBLEM SELECTOR PLAN 1
- cont neuro check per nsgy  - care as per Neuro surgery team   - cont nimodipine for vasospasm as per NeuroSX   - pt is on  for web  - pharmacologic DVT ppx as per NSG, currently on lovenox 40.  MRI pending

## 2019-10-29 NOTE — PROGRESS NOTE ADULT - SUBJECTIVE AND OBJECTIVE BOX
[facility] Division of Hospital Medicine  Saturnino Conlye MD  Pager (TREY-F, 3X-1A): [phone]  Other Times:  [phone]    Patient is a 54y old  Male who presents with a chief complaint of Patient was admitted with subarachnoid hemorrhage. (29 Oct 2019 12:17)      SUBJECTIVE / OVERNIGHT EVENTS: pt has no ha and otherwise feels well  ADDITIONAL REVIEW OF SYSTEMS:    MEDICATIONS  (STANDING):  aspirin enteric coated 325 milliGRAM(s) Oral <User Schedule>  enoxaparin Injectable 40 milliGRAM(s) SubCutaneous <User Schedule>  influenza   Vaccine 0.5 milliLiter(s) IntraMuscular once  niMODipine 30 milliGRAM(s) Oral every 2 hours  polyethylene glycol 3350 17 Gram(s) Oral daily  senna 1 Tablet(s) Oral daily  sodium chloride 2 Gram(s) Oral every 8 hours  sodium chloride 2% . 1000 milliLiter(s) (75 mL/Hr) IV Continuous <Continuous>  tamsulosin 0.4 milliGRAM(s) Oral at bedtime    MEDICATIONS  (PRN):  oxyCODONE    IR 5 milliGRAM(s) Oral every 4 hours PRN Moderate Pain (4 - 6)  oxyCODONE    IR 10 milliGRAM(s) Oral every 4 hours PRN Severe Pain (7 - 10)      CAPILLARY BLOOD GLUCOSE        I&O's Summary    28 Oct 2019 07:01  -  29 Oct 2019 07:00  --------------------------------------------------------  IN: 960 mL / OUT: 750 mL / NET: 210 mL    29 Oct 2019 07:01  -  29 Oct 2019 12:52  --------------------------------------------------------  IN: 375 mL / OUT: 0 mL / NET: 375 mL        PHYSICAL EXAM:  Vital Signs Last 24 Hrs  T(C): 36.7 (29 Oct 2019 12:26), Max: 37.6 (28 Oct 2019 15:35)  T(F): 98 (29 Oct 2019 12:26), Max: 99.6 (28 Oct 2019 15:35)  HR: 84 (29 Oct 2019 12:26) (56 - 84)  BP: 136/93 (29 Oct 2019 12:26) (110/72 - 144/97)  BP(mean): --  RR: 18 (29 Oct 2019 12:26) (18 - 18)  SpO2: 98% (29 Oct 2019 12:26) (96% - 99%)    GENERAL: Alert. Not confused. No acute distress. Not thin. Not cachectic. Not obese.  CARDIAC: RRR  LUNG/CHEST: CTAB. BS equal bilaterally. No wheezes. No rales. No rhonchi.  ABDOMEN: Soft. No tenderness. No distension. No fluid wave. Normal bowel sounds.  EXTREMITIES:  No clubbing. No cyanosis. No edema. Moving all 4.  NEUROLOGY: A&Ox3. Non-focal exam.   SKIN: vitiligo skin changes    LABS:                        15.2   11.94 )-----------( 194      ( 28 Oct 2019 09:06 )             42.9     10-29    135  |  103  |  15  ----------------------------<  110<H>  4.1   |  23  |  0.89    Ca    8.2<L>      29 Oct 2019 07:54      PT/INR - ( 28 Oct 2019 09:06 )   PT: 14.5 sec;   INR: 1.25 ratio         PTT - ( 28 Oct 2019 09:06 )  PTT:25.5 sec      RADIOLOGY & ADDITIONAL TESTS:  Results Reviewed:   Imaging Personally Reviewed:  Electrocardiogram Personally Reviewed:    COORDINATION OF CARE:  Care Discussed with Consultants/Other Providers [Y/N]: KAYLA hawley plan of care  Prior or Outpatient Records Reviewed [Y/N]:

## 2019-10-29 NOTE — CHART NOTE - NSCHARTNOTEFT_GEN_A_CORE
Transcranial doppler exam #1 (10/24/19) 12pm  mean velocity  cm/sec                              Left             Right  MELISSA                 29(technical)    69  MCA                56                  57  PCA                  23,22            30,28  VERT                  30                34  BA                                39    Official report to follow.  S.Poncho    Transcranial doppler exam #2 (10/25/19) 1030am  mean velocity  cm/sec                              Left         Right  MELISSA                    36           59  MCA                    53           51  PCA                    28,19       25,24  Official report to follow.  S.Poncho    Transcranial doppler exam #3 (10/26/19) 1115am  mean velocity  cm/sec                              Left         Right  MELISSA                    47           43  MCA                   51            54  Official report to follow.  S.Poncho    Transcranial doppler exam #4 (10/28/19) 11am  mean velocity  cm/sec                              Left         Right  MELISSA                    48           69  MCA                   51           53  PCA                     P2-19      28,27   Official report to follow.  S.Poncho    Transcranial doppler exam #5 (10/29/19) 1130am  mean velocity  cm/sec                              Left         Right  MELISSA                    54           76  MCA                   58           62   PCA                     x             P2-32  Official report to follow.  SMarge

## 2019-10-29 NOTE — PROGRESS NOTE ADULT - PROBLEM SELECTOR PLAN 4
On  nimodipine 30 q2.   cont to monitor BP  transition to Norvasc 5mg when nimodipine weaned  needs PMD - refer to [address] [facility] [address] [phone]

## 2019-10-30 LAB
ANION GAP SERPL CALC-SCNC: 10 MMOL/L — SIGNIFICANT CHANGE UP (ref 5–17)
ANION GAP SERPL CALC-SCNC: 12 MMOL/L — SIGNIFICANT CHANGE UP (ref 5–17)
ANION GAP SERPL CALC-SCNC: 9 MMOL/L — SIGNIFICANT CHANGE UP (ref 5–17)
BUN SERPL-MCNC: 14 MG/DL — SIGNIFICANT CHANGE UP (ref 7–23)
BUN SERPL-MCNC: 16 MG/DL — SIGNIFICANT CHANGE UP (ref 7–23)
BUN SERPL-MCNC: 19 MG/DL — SIGNIFICANT CHANGE UP (ref 7–23)
CALCIUM SERPL-MCNC: 8.5 MG/DL — SIGNIFICANT CHANGE UP (ref 8.4–10.5)
CALCIUM SERPL-MCNC: 8.8 MG/DL — SIGNIFICANT CHANGE UP (ref 8.4–10.5)
CALCIUM SERPL-MCNC: 8.9 MG/DL — SIGNIFICANT CHANGE UP (ref 8.4–10.5)
CHLORIDE SERPL-SCNC: 101 MMOL/L — SIGNIFICANT CHANGE UP (ref 96–108)
CHLORIDE SERPL-SCNC: 103 MMOL/L — SIGNIFICANT CHANGE UP (ref 96–108)
CHLORIDE SERPL-SCNC: 97 MMOL/L — SIGNIFICANT CHANGE UP (ref 96–108)
CO2 SERPL-SCNC: 22 MMOL/L — SIGNIFICANT CHANGE UP (ref 22–31)
CO2 SERPL-SCNC: 24 MMOL/L — SIGNIFICANT CHANGE UP (ref 22–31)
CO2 SERPL-SCNC: 25 MMOL/L — SIGNIFICANT CHANGE UP (ref 22–31)
CREAT SERPL-MCNC: 0.71 MG/DL — SIGNIFICANT CHANGE UP (ref 0.5–1.3)
CREAT SERPL-MCNC: 0.8 MG/DL — SIGNIFICANT CHANGE UP (ref 0.5–1.3)
CREAT SERPL-MCNC: 0.87 MG/DL — SIGNIFICANT CHANGE UP (ref 0.5–1.3)
GLUCOSE SERPL-MCNC: 110 MG/DL — HIGH (ref 70–99)
GLUCOSE SERPL-MCNC: 134 MG/DL — HIGH (ref 70–99)
GLUCOSE SERPL-MCNC: 153 MG/DL — HIGH (ref 70–99)
POTASSIUM SERPL-MCNC: 3.8 MMOL/L — SIGNIFICANT CHANGE UP (ref 3.5–5.3)
POTASSIUM SERPL-MCNC: 3.8 MMOL/L — SIGNIFICANT CHANGE UP (ref 3.5–5.3)
POTASSIUM SERPL-MCNC: 4.1 MMOL/L — SIGNIFICANT CHANGE UP (ref 3.5–5.3)
POTASSIUM SERPL-SCNC: 3.8 MMOL/L — SIGNIFICANT CHANGE UP (ref 3.5–5.3)
POTASSIUM SERPL-SCNC: 3.8 MMOL/L — SIGNIFICANT CHANGE UP (ref 3.5–5.3)
POTASSIUM SERPL-SCNC: 4.1 MMOL/L — SIGNIFICANT CHANGE UP (ref 3.5–5.3)
SODIUM SERPL-SCNC: 132 MMOL/L — LOW (ref 135–145)
SODIUM SERPL-SCNC: 134 MMOL/L — LOW (ref 135–145)
SODIUM SERPL-SCNC: 137 MMOL/L — SIGNIFICANT CHANGE UP (ref 135–145)

## 2019-10-30 PROCEDURE — 70551 MRI BRAIN STEM W/O DYE: CPT | Mod: 26

## 2019-10-30 PROCEDURE — 99232 SBSQ HOSP IP/OBS MODERATE 35: CPT

## 2019-10-30 PROCEDURE — 93888 INTRACRANIAL LIMITED STUDY: CPT | Mod: 26

## 2019-10-30 PROCEDURE — 99233 SBSQ HOSP IP/OBS HIGH 50: CPT

## 2019-10-30 RX ORDER — SODIUM CHLORIDE 9 MG/ML
2 INJECTION INTRAMUSCULAR; INTRAVENOUS; SUBCUTANEOUS EVERY 6 HOURS
Refills: 0 | Status: DISCONTINUED | OUTPATIENT
Start: 2019-10-30 | End: 2019-10-31

## 2019-10-30 RX ORDER — SODIUM CHLORIDE 5 G/100ML
1000 INJECTION, SOLUTION INTRAVENOUS
Refills: 0 | Status: DISCONTINUED | OUTPATIENT
Start: 2019-10-30 | End: 2019-10-31

## 2019-10-30 RX ADMIN — NIMODIPINE 30 MILLIGRAM(S): 60 SOLUTION ORAL at 20:16

## 2019-10-30 RX ADMIN — OXYCODONE HYDROCHLORIDE 10 MILLIGRAM(S): 5 TABLET ORAL at 12:34

## 2019-10-30 RX ADMIN — OXYCODONE HYDROCHLORIDE 10 MILLIGRAM(S): 5 TABLET ORAL at 21:20

## 2019-10-30 RX ADMIN — SODIUM CHLORIDE 2 GRAM(S): 9 INJECTION INTRAMUSCULAR; INTRAVENOUS; SUBCUTANEOUS at 18:27

## 2019-10-30 RX ADMIN — OXYCODONE HYDROCHLORIDE 10 MILLIGRAM(S): 5 TABLET ORAL at 13:00

## 2019-10-30 RX ADMIN — NIMODIPINE 30 MILLIGRAM(S): 60 SOLUTION ORAL at 22:16

## 2019-10-30 RX ADMIN — Medication 325 MILLIGRAM(S): at 05:33

## 2019-10-30 RX ADMIN — NIMODIPINE 30 MILLIGRAM(S): 60 SOLUTION ORAL at 18:26

## 2019-10-30 RX ADMIN — NIMODIPINE 30 MILLIGRAM(S): 60 SOLUTION ORAL at 01:56

## 2019-10-30 RX ADMIN — OXYCODONE HYDROCHLORIDE 10 MILLIGRAM(S): 5 TABLET ORAL at 20:50

## 2019-10-30 RX ADMIN — ENOXAPARIN SODIUM 40 MILLIGRAM(S): 100 INJECTION SUBCUTANEOUS at 18:26

## 2019-10-30 RX ADMIN — NIMODIPINE 30 MILLIGRAM(S): 60 SOLUTION ORAL at 12:34

## 2019-10-30 RX ADMIN — SODIUM CHLORIDE 60 MILLILITER(S): 5 INJECTION, SOLUTION INTRAVENOUS at 14:39

## 2019-10-30 RX ADMIN — SODIUM CHLORIDE 2 GRAM(S): 9 INJECTION INTRAMUSCULAR; INTRAVENOUS; SUBCUTANEOUS at 05:33

## 2019-10-30 RX ADMIN — NIMODIPINE 30 MILLIGRAM(S): 60 SOLUTION ORAL at 16:15

## 2019-10-30 RX ADMIN — TAMSULOSIN HYDROCHLORIDE 0.4 MILLIGRAM(S): 0.4 CAPSULE ORAL at 20:49

## 2019-10-30 RX ADMIN — SODIUM CHLORIDE 2 GRAM(S): 9 INJECTION INTRAMUSCULAR; INTRAVENOUS; SUBCUTANEOUS at 12:34

## 2019-10-30 NOTE — PROGRESS NOTE ADULT - ASSESSMENT
HPI:  54M with no known PMH but has not seen a physician in decades presents with HA and dizziness. Last Mon afternoon started with migrating HA and neck pain. Friday was doing some chores outside and around 10pm noted return of headache, mostly on the top of the head. The pain kept increasing overnight. Couldn't sleep. Pain was the worst he could recall in recent memory. Endorses nausea but no vomiting. Dizziness is positional and worse when he bends over or stands up quickly. Denies neck pain, chest pain, light sensitivity, chest pain, sob, abd pain, diarrhea, rash. Pt endorsed some chills at home but no fever. Denies recent trauma. Did fall and hit his head two years ago, and didn't seek treatment. 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 on 10/22   POD#8 PBD #9    PLAN:  1 Out of bed   2 continue physical therapy   3 TCD in am   4 prn pain meds   5 hyponatremia on , salt tab and 1 litre free water restriction-F/U AT NOON TODAY   6 continue nimodipine   7 regular diet   8 stool softeners   9 dispo : home once stable     Assessment:  Please Check When Present   []  GCS  E   V  M     Heart Failure: []Acute, [] acute on chronic , []chronic  Heart Failure:  [] Diastolic (HFpEF), [] Systolic (HFrEF), []Combined (HFpEF and HFrEF), [] RHF, [] Pulm HTN, [] Other    [] TIFFANIE, [] ATN, [] AIN, [] other  [] CKD1, [] CKD2, [] CKD 3, [] CKD 4, [] CKD 5, []ESRD    Encephalopathy: [] Metabolic, [] Hepatic, [] toxic, [] Neurological, [] Other    Abnormal Nurtitional Status: [] malnurtition (see nutrition note), [ ]underweight: BMI < 19, [] morbid obesity: BMI >40, [] Cachexia    [] Sepsis  [] hypovolemic shock,[] cardiogenic shock, [] hemorrhagic shock, [] neuogenic shock  [] Acute Respiratory Failure  []Cerebral edema, [] Brain compression/ herniation,   [] Functional quadriplegia  [] Acute blood loss anemia HPI:  54M with no known PMH but has not seen a physician in decades presents with HA and dizziness. Last Mon afternoon started with migrating HA and neck pain. Friday was doing some chores outside and around 10pm noted return of headache, mostly on the top of the head. The pain kept increasing overnight. Couldn't sleep. Pain was the worst he could recall in recent memory. Endorses nausea but no vomiting. Dizziness is positional and worse when he bends over or stands up quickly. Denies neck pain, chest pain, light sensitivity, chest pain, sob, abd pain, diarrhea, rash. Pt endorsed some chills at home but no fever. Denies recent trauma. Did fall and hit his head two years ago, and didn't seek treatment. 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 on 10/22   POD#8 PBD #9    PLAN:  1 Out of bed   2 continue physical therapy   3 TCD in am   4 prn pain meds   5 hyponatremia on , salt tab and 1 litre free water restriction-F/U AT NOON TODAY   6 continue nimodipine   7 regular diet   8 stool softeners   9 dispo : home once stable     Assessment:  Please Check When Present   []  GCS  E   V  M     Heart Failure: []Acute, [] acute on chronic , []chronic  Heart Failure:  [] Diastolic (HFpEF), [] Systolic (HFrEF), []Combined (HFpEF and HFrEF), [] RHF, [] Pulm HTN, [] Other    [] TIFFANIE, [] ATN, [] AIN, [] other  [] CKD1, [] CKD2, [] CKD 3, [] CKD 4, [] CKD 5, []ESRD    Encephalopathy: [] Metabolic, [] Hepatic, [] toxic, [] Neurological, [] Other    Abnormal Nurtitional Status: [] malnurtition (see nutrition note), [ ]underweight: BMI < 19, [] morbid obesity: BMI >40, [] Cachexia    [] Sepsis  [] hypovolemic shock,[] cardiogenic shock, [] hemorrhagic shock, [] neuogenic shock  [] Acute Respiratory Failure  []Cerebral edema, [] Brain compression/ herniation,   [] Functional quadriplegia  [] Acute blood loss anemia  More than 40 minutes were spent educating the patient and family regarding condition, medications, follow up plans, signs and symptoms to be concerned with, preparing paperwork, and questions answered regarding discharge.

## 2019-10-30 NOTE — PROGRESS NOTE ADULT - PROBLEM SELECTOR PLAN 1
- cont neuro check per nsgy  - care as per Neuro surgery team   - cont nimodipine for vasospasm as per NeuroSX   - pt is on  for web  - pharmacologic DVT ppx as per NSG, currently on lovenox 40.  MRI performed, f/u nsgy review

## 2019-10-30 NOTE — PROGRESS NOTE ADULT - SUBJECTIVE AND OBJECTIVE BOX
[facility] Division of Hospital Medicine  Saturnino Conley MD  Pager (TREY-F, 7D-9O): [phone]  Other Times:  [phone]    Patient is a 54y old  Male who presents with a chief complaint of Patient was admitted with subarachnoid hemorrhage. (30 Oct 2019 09:52)      SUBJECTIVE / OVERNIGHT EVENTS: no new complaints  ADDITIONAL REVIEW OF SYSTEMS:    MEDICATIONS  (STANDING):  aspirin enteric coated 325 milliGRAM(s) Oral <User Schedule>  enoxaparin Injectable 40 milliGRAM(s) SubCutaneous <User Schedule>  influenza   Vaccine 0.5 milliLiter(s) IntraMuscular once  niMODipine 30 milliGRAM(s) Oral every 2 hours  polyethylene glycol 3350 17 Gram(s) Oral daily  senna 1 Tablet(s) Oral daily  sodium chloride 2 Gram(s) Oral every 6 hours  sodium chloride 2% . 1000 milliLiter(s) (60 mL/Hr) IV Continuous <Continuous>  tamsulosin 0.4 milliGRAM(s) Oral at bedtime    MEDICATIONS  (PRN):  oxyCODONE    IR 5 milliGRAM(s) Oral every 4 hours PRN Moderate Pain (4 - 6)  oxyCODONE    IR 10 milliGRAM(s) Oral every 4 hours PRN Severe Pain (7 - 10)      CAPILLARY BLOOD GLUCOSE        I&O's Summary    29 Oct 2019 07:01  -  30 Oct 2019 07:00  --------------------------------------------------------  IN: 1425 mL / OUT: 850 mL / NET: 575 mL    30 Oct 2019 07:01  -  30 Oct 2019 14:13  --------------------------------------------------------  IN: 480 mL / OUT: 300 mL / NET: 180 mL        PHYSICAL EXAM:  Vital Signs Last 24 Hrs  T(C): 37.3 (30 Oct 2019 12:05), Max: 37.3 (30 Oct 2019 12:05)  T(F): 99.1 (30 Oct 2019 12:05), Max: 99.1 (30 Oct 2019 12:05)  HR: 73 (30 Oct 2019 12:05) (55 - 83)  BP: 149/89 (30 Oct 2019 12:05) (112/62 - 149/89)  BP(mean): --  RR: 18 (30 Oct 2019 12:05) (16 - 18)  SpO2: 98% (30 Oct 2019 12:05) (96% - 98%)    GENERAL: Alert. Not confused. No acute distress. Not thin. Not cachectic. Not obese.  CARDIAC: RRR  LUNG/CHEST: CTAB. BS equal bilaterally. No wheezes. No rales. No rhonchi.  ABDOMEN: Soft. No tenderness. No distension. No fluid wave. Normal bowel sounds.  EXTREMITIES:  No clubbing. No cyanosis. No edema. Moving all 4.  NEUROLOGY: A&Ox3. Non-focal exam.   SKIN: vitiligo skin changes    LABS:    10-30    132<L>  |  97  |  16  ----------------------------<  134<H>  3.8   |  25  |  0.87    Ca    8.9      30 Oct 2019 11:42    RADIOLOGY & ADDITIONAL TESTS:  Results Reviewed:   Imaging Personally Reviewed: < from: MR Head No Cont (10.30.19 @ 11:49) >  IMPRESSION: Resolving subarachnoid hemorrhage compared with 10/23/2019.   Web device region of the anterior communicating artery aneurysm with good   intracranial flow. No evidence of vasospasm or infarcts.    < end of copied text >    Electrocardiogram Personally Reviewed:    COORDINATION OF CARE:  Care Discussed with Consultants/Other Providers [Y/N]: NSGY team re plan of care  Prior or Outpatient Records Reviewed [Y/N]:

## 2019-10-30 NOTE — CONSULT NOTE ADULT - SUBJECTIVE AND OBJECTIVE BOX
No outpt PMD or health care providers    CC: new severe headache, dizziness, neck pain, nausea, diaphoresis    54M c hx vitiligo, and no other PMHx, pw headache, found to have ~4mm R anterior comm artery aneurysm with trace SAH of left sylvian fissure, now s/p web embolization POD#3.    Pt reports having transient headache on 10/14 that self-resolved. Pt was asymptomatic the following 3 days. Then on 10/18, pt had 1 beer and 1 shot of whisky to celebrate his birthday, and later that evening, had severe top of head headache unlike any he's had before. This headache continued to get worse until he presented to the ED on 10/21. Pt is now s/p 3 day decadron and off keppra.    INTERVAL HPI/OVERNIGHT EVENTS:    Pt is doing well on the floor. Denies headache, vision change, speech/swallow changes, or any focal neurologic deficits. 3x BM yesterday.    REVIEW OF SYSTEMS:  CONSTITUTIONAL: No weakness. No fevers. No chills. No rigors. No weight loss. No night sweats. +poor appetite.  EYES: No visual changes. No eye pain.  ENT: No hearing difficulty. No vertigo. No dysphagia. No sore throat. No Sinusitis/rhinorrhea.   NECK: No pain. No stiffness/rigidity.  CARDIAC: No chest pain. No palpitations. No lightheadedness.  RESPIRATORY: No cough. No SOB. No hemoptysis.  GASTROINTESTINAL: No abdominal pain. No nausea. No vomiting. No hematemesis. No diarrhea. +constipation. No melena. No hematochezia.  GENITOURINARY: No dysuria. No frequency. No hesitancy. No hematuria. No oliguria.  NEUROLOGICAL: No numbness/tingling. No focal weakness. No urinary or fecal incontinence. No headache. No unsteady gait.  BACK: No back pain. No flank pain.  EXTREMITIES: No lower extremity edema. Full ROM. No joint pain.  SKIN: No rashes. No itching. No other lesions.  PSYCHIATRIC: No depression. No anxiety. No SI/HI.  ALLERGIC: No lip swelling. No hives.  All other review of systems is negative unless indicated above.  Unless indicated above, unable to assess ROS 2/2     Social History:  Marital Status: ( x ) , (  ) Single, (  ) , (  ) , (  )   # of Children: 1 son, 1 daughter  Lives with: (  ) alone, ( x ) children, ( x ) spouse, (  ) parents, (  ) siblings, (  ) friends, (  ) other:   Occupation: technician for [employer]  Substance Use/Illicit Drugs: (  ) never used vs other:   Tobacco Usage: ( x ) never smoked, (  ) former smoker, (  ) current smoker and Total Pack-Years:   Last Alcohol Usage/Frequency/Amount/Withdrawal/Hx of Abuse:  last drink had 1 beer and 1 shot of whiskey on the day of bleed.  Foreign travel:   Animal exposure:    Family History:  Niece has vitiligo  Mother had bone cancer    PMHx/PSHx:  PAST MEDICAL & SURGICAL HISTORY:  Vitiligo  No significant past surgical history    Allergies: No Known Allergies    No home meds    MEDICATIONS  (STANDING):  aspirin enteric coated 325 milliGRAM(s) Oral <User Schedule>  enoxaparin Injectable 40 milliGRAM(s) SubCutaneous <User Schedule>  influenza   Vaccine 0.5 milliLiter(s) IntraMuscular once  niMODipine 30 milliGRAM(s) Oral every 2 hours  polyethylene glycol 3350 17 Gram(s) Oral daily  senna 1 Tablet(s) Oral daily  sodium chloride 2% . 1000 milliLiter(s) (50 mL/Hr) IV Continuous <Continuous>    MEDICATIONS  (PRN):  acetaminophen   Tablet .. 975 milliGRAM(s) Oral every 6 hours PRN Temp greater or equal to 38.5C (101.3F), Mild Pain (1 - 3)  oxyCODONE    IR 5 milliGRAM(s) Oral every 4 hours PRN Moderate Pain (4 - 6)  oxyCODONE    IR 10 milliGRAM(s) Oral every 4 hours PRN Severe Pain (7 - 10)      Vital Signs: T(C): 36.9 (10-25-19 @ 19:56), Max: 37.2 (10-25-19 @ 11:00)  HR: 66 (10-25-19 @ 20:00) (55 - 88)  BP: 148/93 (10-25-19 @ 20:00) (123/90 - 152/101)  RR: 18 (10-25-19 @ 19:56) (12 - 31)  SpO2: 97% (10-25-19 @ 19:56) (95% - 100%)  Wt(kg): --Vital Signs Last 24 Hrs  T(C): 36.9 (25 Oct 2019 19:56), Max: 37.2 (25 Oct 2019 11:00)  T(F): 98.4 (25 Oct 2019 19:56), Max: 99 (25 Oct 2019 11:00)  HR: 66 (25 Oct 2019 20:00) (55 - 88)  BP: 148/93 (25 Oct 2019 20:00) (123/90 - 152/101)  BP(mean): 104 (25 Oct 2019 16:00) (94 - 117)  RR: 18 (25 Oct 2019 19:56) (12 - 31)  SpO2: 97% (25 Oct 2019 19:56) (95% - 100%)  I&O:   10-24 @ 07:01  -  10-25 @ 07:00  --------------------------------------------------------  IN: 1625 mL / OUT: 2150 mL / NET: -525 mL    10-25 @ 07:01  -  10-25 @ 21:43  --------------------------------------------------------  IN: 1000 mL / OUT: 250 mL / NET: 750 mL        PHYSICAL EXAM:   GENERAL: Alert. Not confused. No acute distress. Not thin. Not cachectic. Not obese.  HEAD:  Atraumatic. Normocephalic.  EYES: EOMI. PERRLA. Normal conjunctiva/sclera.  ENT: Neck supple. No JVD. Moist oral mucosa. Not edentulous. No thrush.  LYMPH: Normal supraclavicular/cervical lymph nodes.   CARDIAC: Not tachy, Not catalina. Regular rhythm. Not irregularly irregular. S1. S2. No murmur. No rub. No distant heart sounds.  LUNG/CHEST: CTAB. BS equal bilaterally. No wheezes. No rales. No rhonchi.  ABDOMEN: Soft. No tenderness. No distension. No fluid wave. Normal bowel sounds.  BACK: No midline/vertebral tenderness. No flank tenderness.  VASCULAR: +2 b/l radial or ulnar pulses. Palpable DP pulses.  EXTREMITIES:  No clubbing. No cyanosis. No edema. Moving all 4.  NEUROLOGY: A&Ox3. Non-focal exam. Cranial nerves intact. Normal speech. Sensation intact.  PSYCH: Normal behavior. Normal affect.  SKIN: No jaundice. No erythema. No rash/lesion.  Vascular Access:     Personally reviewed imaging, labs, EKG.    LABS:    10-25    132<L>  |  100  |  21  ----------------------------<  114<H>  4.3   |  21<L>  |  0.72    Ca    8.5      25 Oct 2019 12:30  Phos  3.4     10-25  Mg     2.2     10-25    CAPILLARY BLOOD GLUCOSE    RADIOLOGY & ADDITIONAL TESTS:    Imaging Personally Reviewed:  [ ] YES  [ ] NO    Consultant(s) Notes Reviewed:  [ x ] YES  [  ] NO  Care Discussed with Consultants/Primary Team [ x ] YES  [  ] NO
[facility]  [phone]  NEPHROLOGY      INITIAL CONSULT NOTE  --------------------------------------------------------------------------------  HPI:      54M with no known PMH but has not seen a physician in decades presents with HA and dizziness. Last Mon afternoon started with migrating HA and neck pain.  The pain kept worsening. admitted dx with SAH. also had hyponatremia. started on 2% nacl ivf. attempted to taper hypertonic saline and na started to decrease despite nacl tabs as well. renal consult called.    PAST HISTORY  --------------------------------------------------------------------------------  PAST MEDICAL & SURGICAL HISTORY:  Vitiligo  No significant past surgical history    FAMILY HISTORY: no hx ckd     PAST SOCIAL HISTORY: no tobacco use     ALLERGIES & MEDICATIONS  --------------------------------------------------------------------------------  Allergies    No Known Allergies    Intolerances      Standing Inpatient Medications  aspirin enteric coated 325 milliGRAM(s) Oral <User Schedule>  enoxaparin Injectable 40 milliGRAM(s) SubCutaneous <User Schedule>  influenza   Vaccine 0.5 milliLiter(s) IntraMuscular once  niMODipine 30 milliGRAM(s) Oral every 2 hours  polyethylene glycol 3350 17 Gram(s) Oral daily  senna 1 Tablet(s) Oral daily  sodium chloride 2 Gram(s) Oral every 6 hours  sodium chloride 2% . 1000 milliLiter(s) IV Continuous <Continuous>  tamsulosin 0.4 milliGRAM(s) Oral at bedtime    PRN Inpatient Medications  oxyCODONE    IR 5 milliGRAM(s) Oral every 4 hours PRN      REVIEW OF SYSTEMS  --------------------------------------------------------------------------------  Gen: No  fevers/chills   Skin: No rashes  Head/Eyes/Ears/Mouth: No further  headache; Normal hearing;  No sinus pain/discomfort, sore throat  Respiratory: No dyspnea, cough, wheezing  CV: No chest pain, orthopnea  GI: No abdominal pain, diarrhea, nausea, vomiting, melena  : No dysuria, decrease urination or hesitancy urinating  hematuria, nocturia  MSK: No joint pain/swelling; no back pain  Neuro: No dizziness/lightheadedness,  also with no edema     All other systems were reviewed and are negative, except as noted.    VITALS/PHYSICAL EXAM  --------------------------------------------------------------------------------  T(C): 36.9 (10-30-19 @ 19:59), Max: 37.3 (10-30-19 @ 12:05)  HR: 65 (10-30-19 @ 22:15) (55 - 90)  BP: 148/93 (10-30-19 @ 22:15) (112/62 - 149/89)  RR: 18 (10-30-19 @ 19:59) (18 - 18)  SpO2: 97% (10-30-19 @ 19:59) (96% - 98%)  Wt(kg): --        10-29-19 @ 07:01  -  10-30-19 @ 07:00  --------------------------------------------------------  IN: 1425 mL / OUT: 850 mL / NET: 575 mL    10-30-19 @ 07:01  -  10-30-19 @ 22:17  --------------------------------------------------------  IN: 1320 mL / OUT: 300 mL / NET: 1020 mL      Physical Exam:  	Gen: Non toxic comfortable appearing   	no jvd   	Pulm: decrease bs  no rales or ronchi or wheezing  	CV: RRR, S1S2; no rub  	Back: No CVA tenderness; no sacral edema  	Abd: +BS, soft, nontender/nondistended  	: No suprapubic tenderness  	UE: Warm, no cyanosis  no clubbing,  no edema  	LE: Warm, no cyanosis  no clubbing, no edema  	Neuro: alert and oriented. speech coherent   	Psych: Normal affect and mood  	Skin: Warm, vitilgo      LABS/STUDIES  --------------------------------------------------------------------------------    137  |  101  |  19  ----------------------------<  153      [10-30-19 @ 20:40]  4.1   |  24  |  0.80        Ca     8.8     [10-30-19 @ 20:40]            Creatinine Trend:  SCr 0.80 [10-30 @ 20:40]  SCr 0.87 [10-30 @ 11:42]  SCr 0.71 [10-30 @ 05:56]  SCr 0.81 [10-29 @ 20:28]  SCr 0.80 [10-29 @ 14:28]    Urinalysis - [10-26-19 @ 02:32]      Color Light Yellow / Appearance Clear / SG 1.013 / pH 6.5      Gluc Negative / Ketone Negative  / Bili Negative / Urobili <2 mg/dL       Blood Negative / Protein Negative / Leuk Est Negative / Nitrite Negative      RBC  / WBC  / Hyaline  / Gran  / Sq Epi  / Non Sq Epi  / Bacteria     Urine Creatinine 54      [10-26-19 @ 00:24]  Urine Protein 5      [10-26-19 @ 02:32]  Urine Sodium 100      [10-26-19 @ 00:24]  Urine Urea Nitrogen 605      [10-26-19 @ 02:32]  Urine Osmolality 450      [10-26-19 @ 00:24]    HbA1c 5.7      [10-21-19 @ 22:34]  TSH 0.62      [10-26-19 @ 09:47]  Lipid: chol 151, TG 81, HDL 42, LDL 93      [10-22-19 @ 02:42]

## 2019-10-30 NOTE — CONSULT NOTE ADULT - ASSESSMENT
54M c hx vitiligo, and no other PMHx, pw ~4mm R anterior comm artery aneurysm with trace SAH of left sylvian fissure, now s/p web embolization POD#3.
54M with no known PMH but has not seen a physician in decades presents with HA and dizziness. admitted dx with SAH. also had hyponatremia. started on 2% nacl ivf. attempted to taper hypertonic saline and na started to decrease despite nacl tabs as well.     1- hyponatremia likely due to siadh   2- bph     urine lytes will not be helpful given pt is already on hypertonic saline but suspect siadh  to slowly decrease 2% nacl to taper off start with nacl decrease to 40 cc.hr cont monitor na and keep na > 132  cont flomax

## 2019-10-30 NOTE — PROGRESS NOTE ADULT - PROBLEM SELECTOR PLAN 4
On  nimodipine 30 q2.   cont to monitor BP  transition to Norvasc 5mg when nimodipine weaned  needs PMD - refer to 5 [facility] [phone]    if appt not made would have pt call [phone] to make appt w/primary care

## 2019-10-30 NOTE — PROGRESS NOTE ADULT - SUBJECTIVE AND OBJECTIVE BOX
SUBJECTIVE:     OVERNIGHT EVENTS:     Vital Signs Last 24 Hrs  T(C): 37.2 (30 Oct 2019 08:06), Max: 37.2 (29 Oct 2019 19:31)  T(F): 99 (30 Oct 2019 08:06), Max: 99 (30 Oct 2019 08:06)  HR: 63 (30 Oct 2019 08:06) (55 - 84)  BP: 132/86 (30 Oct 2019 08:06) (112/62 - 144/97)  BP(mean): --  RR: 18 (30 Oct 2019 08:06) (16 - 18)  SpO2: 98% (30 Oct 2019 08:06) (96% - 98%)    PHYSICAL EXAM:    General: No Acute Distress     Neurological: Awake, alert oriented to person, place and time, Following Commands, PERRL, EOMI, Face Symmetrical, Speech Fluent, Moving all extremities, Muscle Strength normal in all four extremities, No Drift, Sensation to Light Touch Intact    Pulmonary: Clear to Auscultation, No Rales, No Rhonchi, No Wheezes     Cardiovascular: S1, S2, Regular Rate and Rhythm     Gastrointestinal: Soft, Nontender, Nondistended     Incision:     LABS:   10-30    134<L>  |  103  |  14  ----------------------------<  110<H>  3.8   |  22  |  0.71    Ca    8.5      30 Oct 2019 05:56      Hemoglobin A1C, Whole Blood: 5.7 % (10-21 @ 22:34)      10-29 @ 07:01  -  10-30 @ 07:00  --------------------------------------------------------  IN: 1425 mL / OUT: 850 mL / NET: 575 mL    10-30 @ 07:01  -  10-30 @ 09:57  --------------------------------------------------------  IN: 0 mL / OUT: 300 mL / NET: -300 mL      DRAINS:     MEDICATIONS:  Antibiotics:    Neuro:  oxyCODONE    IR 5 milliGRAM(s) Oral every 4 hours PRN Moderate Pain (4 - 6)  oxyCODONE    IR 10 milliGRAM(s) Oral every 4 hours PRN Severe Pain (7 - 10)    Cardiac:  niMODipine 30 milliGRAM(s) Oral every 2 hours  tamsulosin 0.4 milliGRAM(s) Oral at bedtime    Pulm:    GI/:  polyethylene glycol 3350 17 Gram(s) Oral daily  senna 1 Tablet(s) Oral daily    Other:   aspirin enteric coated 325 milliGRAM(s) Oral <User Schedule>  enoxaparin Injectable 40 milliGRAM(s) SubCutaneous <User Schedule>  influenza   Vaccine 0.5 milliLiter(s) IntraMuscular once  sodium chloride 2 Gram(s) Oral every 6 hours    DIET: [] Regular [] CCD [] Renal [] Puree [] Dysphagia [] Tube Feeds:   regular   IMAGING:   MR nova :p

## 2019-10-30 NOTE — CHART NOTE - NSCHARTNOTEFT_GEN_A_CORE
Transcranial doppler exam #1 (10/24/19) 12pm  mean velocity  cm/sec                              Left             Right  MELISSA                 29(technical)    69  MCA                56                  57  PCA                  23,22            30,28  VERT                  30                34  BA                                39    Official report to follow.  S.Poncho    Transcranial doppler exam #2 (10/25/19) 1030am  mean velocity  cm/sec                              Left         Right  MELISSA                    36           59  MCA                    53           51  PCA                    28,19       25,24  Official report to follow.  S.Poncho    Transcranial doppler exam #3 (10/26/19) 1115am  mean velocity  cm/sec                              Left         Right  MELISSA                    47           43  MCA                   51            54  Official report to follow.  S.Poncho    Transcranial doppler exam #4 (10/28/19) 11am  mean velocity  cm/sec                              Left         Right  MELISSA                    48           69  MCA                   51           53  PCA                     P2-19      28,27   Official report to follow.  S.Poncho    Transcranial doppler exam #5 (10/29/19) 1130am  mean velocity  cm/sec                              Left         Right  MELISSA                    54           76  MCA                   58           62   PCA                     x             P2-32  Official report to follow.  S.Poncho    Transcranial doppler exam #6 (10/30/19) 11:15am  mean velocity  cm/sec                              Left         Right  MELISSA                    45           54  MCA                   52           48   PCA                     P2-20      19,18  Official report to follow.  S.Poncho

## 2019-10-31 LAB
ANION GAP SERPL CALC-SCNC: 8 MMOL/L — SIGNIFICANT CHANGE UP (ref 5–17)
BUN SERPL-MCNC: 15 MG/DL — SIGNIFICANT CHANGE UP (ref 7–23)
CALCIUM SERPL-MCNC: 8.5 MG/DL — SIGNIFICANT CHANGE UP (ref 8.4–10.5)
CHLORIDE SERPL-SCNC: 102 MMOL/L — SIGNIFICANT CHANGE UP (ref 96–108)
CO2 SERPL-SCNC: 25 MMOL/L — SIGNIFICANT CHANGE UP (ref 22–31)
CREAT SERPL-MCNC: 0.81 MG/DL — SIGNIFICANT CHANGE UP (ref 0.5–1.3)
GLUCOSE SERPL-MCNC: 108 MG/DL — HIGH (ref 70–99)
POTASSIUM SERPL-MCNC: 3.9 MMOL/L — SIGNIFICANT CHANGE UP (ref 3.5–5.3)
POTASSIUM SERPL-SCNC: 3.9 MMOL/L — SIGNIFICANT CHANGE UP (ref 3.5–5.3)
SODIUM SERPL-SCNC: 135 MMOL/L — SIGNIFICANT CHANGE UP (ref 135–145)

## 2019-10-31 PROCEDURE — 99232 SBSQ HOSP IP/OBS MODERATE 35: CPT

## 2019-10-31 RX ORDER — SODIUM CHLORIDE 9 MG/ML
1 INJECTION INTRAMUSCULAR; INTRAVENOUS; SUBCUTANEOUS EVERY 12 HOURS
Refills: 0 | Status: DISCONTINUED | OUTPATIENT
Start: 2019-10-31 | End: 2019-11-01

## 2019-10-31 RX ORDER — SODIUM CHLORIDE 5 G/100ML
1000 INJECTION, SOLUTION INTRAVENOUS
Refills: 0 | Status: DISCONTINUED | OUTPATIENT
Start: 2019-10-31 | End: 2019-10-31

## 2019-10-31 RX ORDER — OXYCODONE HYDROCHLORIDE 5 MG/1
10 TABLET ORAL EVERY 4 HOURS
Refills: 0 | Status: DISCONTINUED | OUTPATIENT
Start: 2019-10-31 | End: 2019-11-01

## 2019-10-31 RX ORDER — OXYCODONE HYDROCHLORIDE 5 MG/1
5 TABLET ORAL EVERY 4 HOURS
Refills: 0 | Status: DISCONTINUED | OUTPATIENT
Start: 2019-10-31 | End: 2019-11-01

## 2019-10-31 RX ORDER — AMLODIPINE BESYLATE 2.5 MG/1
5 TABLET ORAL DAILY
Refills: 0 | Status: DISCONTINUED | OUTPATIENT
Start: 2019-10-31 | End: 2019-11-01

## 2019-10-31 RX ADMIN — NIMODIPINE 30 MILLIGRAM(S): 60 SOLUTION ORAL at 08:54

## 2019-10-31 RX ADMIN — NIMODIPINE 30 MILLIGRAM(S): 60 SOLUTION ORAL at 05:56

## 2019-10-31 RX ADMIN — OXYCODONE HYDROCHLORIDE 10 MILLIGRAM(S): 5 TABLET ORAL at 22:33

## 2019-10-31 RX ADMIN — OXYCODONE HYDROCHLORIDE 10 MILLIGRAM(S): 5 TABLET ORAL at 23:03

## 2019-10-31 RX ADMIN — SODIUM CHLORIDE 2 GRAM(S): 9 INJECTION INTRAMUSCULAR; INTRAVENOUS; SUBCUTANEOUS at 00:00

## 2019-10-31 RX ADMIN — TAMSULOSIN HYDROCHLORIDE 0.4 MILLIGRAM(S): 0.4 CAPSULE ORAL at 21:26

## 2019-10-31 RX ADMIN — NIMODIPINE 30 MILLIGRAM(S): 60 SOLUTION ORAL at 00:00

## 2019-10-31 RX ADMIN — SODIUM CHLORIDE 1 GRAM(S): 9 INJECTION INTRAMUSCULAR; INTRAVENOUS; SUBCUTANEOUS at 18:14

## 2019-10-31 RX ADMIN — SODIUM CHLORIDE 2 GRAM(S): 9 INJECTION INTRAMUSCULAR; INTRAVENOUS; SUBCUTANEOUS at 05:56

## 2019-10-31 RX ADMIN — AMLODIPINE BESYLATE 5 MILLIGRAM(S): 2.5 TABLET ORAL at 14:11

## 2019-10-31 RX ADMIN — Medication 325 MILLIGRAM(S): at 05:56

## 2019-10-31 RX ADMIN — ENOXAPARIN SODIUM 40 MILLIGRAM(S): 100 INJECTION SUBCUTANEOUS at 18:14

## 2019-10-31 NOTE — PROGRESS NOTE ADULT - SUBJECTIVE AND OBJECTIVE BOX
[facility] Division of Hospital Medicine  Saturnino Conley MD  Pager (M-F, [phone]): [phone]  Other Times:  [phone]    Patient is a 54y old  Male who presents with a chief complaint of JOSH, R cynthiam (30 Oct 2019 22:16)    SUBJECTIVE / OVERNIGHT EVENTS: no new complaints  ADDITIONAL REVIEW OF SYSTEMS:    MEDICATIONS  (STANDING):  amLODIPine   Tablet 5 milliGRAM(s) Oral daily  aspirin enteric coated 325 milliGRAM(s) Oral <User Schedule>  enoxaparin Injectable 40 milliGRAM(s) SubCutaneous <User Schedule>  influenza   Vaccine 0.5 milliLiter(s) IntraMuscular once  polyethylene glycol 3350 17 Gram(s) Oral daily  senna 1 Tablet(s) Oral daily  sodium chloride 1 Gram(s) Oral every 12 hours  sodium chloride 2% . 1000 milliLiter(s) (20 mL/Hr) IV Continuous <Continuous>  tamsulosin 0.4 milliGRAM(s) Oral at bedtime    MEDICATIONS  (PRN):      CAPILLARY BLOOD GLUCOSE        I&O's Summary    30 Oct 2019 07:01  -  31 Oct 2019 07:00  --------------------------------------------------------  IN: 1320 mL / OUT: 650 mL / NET: 670 mL    31 Oct 2019 07:01  -  31 Oct 2019 13:18  --------------------------------------------------------  IN: 480 mL / OUT: 0 mL / NET: 480 mL        PHYSICAL EXAM:  Vital Signs Last 24 Hrs  T(C): 36.9 (31 Oct 2019 12:14), Max: 37 (30 Oct 2019 18:30)  T(F): 98.4 (31 Oct 2019 12:14), Max: 98.6 (30 Oct 2019 18:30)  HR: 76 (31 Oct 2019 12:14) (56 - 90)  BP: 119/81 (31 Oct 2019 12:14) (119/81 - 148/93)  BP(mean): --  RR: 18 (31 Oct 2019 12:14) (18 - 18)  SpO2: 99% (31 Oct 2019 12:14) (96% - 99%)    GENERAL: Alert. Not confused. No acute distress. Not thin. Not cachectic. Not obese.  CARDIAC: RRR  LUNG/CHEST: CTAB. BS equal bilaterally. No wheezes. No rales. No rhonchi.  ABDOMEN: Soft. No tenderness. No distension. No fluid wave. Normal bowel sounds.  EXTREMITIES:  No clubbing. No cyanosis. No edema. Moving all 4.  NEUROLOGY: A&Ox3. Non-focal exam.   SKIN: vitiligo skin changes    LABS:    10-31    135  |  102  |  15  ----------------------------<  108<H>  3.9   |  25  |  0.81    Ca    8.5      31 Oct 2019 06:00      RADIOLOGY & ADDITIONAL TESTS:  Results Reviewed:   Imaging Personally Reviewed:  Electrocardiogram Personally Reviewed:    COORDINATION OF CARE:  Care Discussed with Consultants/Other Providers [Y/N]: KAYLA sawant  Prior or Outpatient Records Reviewed [Y/N]:

## 2019-10-31 NOTE — PROGRESS NOTE ADULT - ASSESSMENT
HPI:  54M with no known PMH but has not seen a physician in decades presents with HA and dizziness. Last Mon afternoon started with migrating HA and neck pain. Friday was doing some chores outside and around 10pm noted return of headache, mostly on the top of the head. The pain kept increasing overnight. Couldn't sleep. Pain was the worst he could recall in recent memory. Endorses nausea but no vomiting. Dizziness is positional and worse when he bends over or stands up quickly. Denies neck pain, chest pain, light sensitivity, chest pain, sob, abd pain, diarrhea, rash. Pt endorsed some chills at home but no fever. Denies recent trauma. Did fall and hit his head two years ago, and didn't seek treatment. 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 on 10/22/19     PLAN:  1 Out of bed   2 continue physical therapy   3 TCD in am   4 prn pain meds  5 hyponatremia improving on nacl tabs and 2% NACL IVF- Dr Dawkins following- will taper today  6 will change nimodipine to norvasc and monitor for 24hrs.   7 regular diet   8 stool softeners  9 dispo: no skilled PT/OT needs upon d/c  Hospitalist medicine following    will discuss with Dr Jiménez  #[id number]    Assessment:  Please Check When Present   []  GCS  E   V  M     Heart Failure: []Acute, [] acute on chronic , []chronic  Heart Failure:  [] Diastolic (HFpEF), [] Systolic (HFrEF), []Combined (HFpEF and HFrEF), [] RHF, [] Pulm HTN, [] Other    [] TIFFANIE, [] ATN, [] AIN, [] other  [] CKD1, [] CKD2, [] CKD 3, [] CKD 4, [] CKD 5, []ESRD    Encephalopathy: [] Metabolic, [] Hepatic, [] toxic, [] Neurological, [] Other    Abnormal Nurtitional Status: [] malnurtition (see nutrition note), [ ]underweight: BMI < 19, [] morbid obesity: BMI >40, [] Cachexia    [] Sepsis  [] hypovolemic shock,[] cardiogenic shock, [] hemorrhagic shock, [] neuogenic shock  [] Acute Respiratory Failure  []Cerebral edema, [] Brain compression/ herniation,   [] Functional quadriplegia  [] Acute blood loss anemia

## 2019-10-31 NOTE — PROGRESS NOTE ADULT - SUBJECTIVE AND OBJECTIVE BOX
SUBJECTIVE: patient ambulating halls, denies HAs    Vital Signs Last 24 Hrs  T(C): 36.9 (31 Oct 2019 12:14), Max: 37 (30 Oct 2019 18:30)  T(F): 98.4 (31 Oct 2019 12:14), Max: 98.6 (30 Oct 2019 18:30)  HR: 76 (31 Oct 2019 12:14) (56 - 90)  BP: 119/81 (31 Oct 2019 12:14) (119/81 - 148/93)  BP(mean): --  RR: 18 (31 Oct 2019 12:14) (18 - 18)  SpO2: 99% (31 Oct 2019 12:14) (96% - 99%)    PHYSICAL EXAM:    General: No Acute Distress     Neurological: Awake, alert oriented to person, place and time, Following Commands, PERRL, EOMI, Face Symmetrical, Speech Fluent, Moving all extremities, Muscle Strength normal in all four extremities,   No Drift, Sensation to Light Touch Intact    Pulmonary: Clear to Auscultation, No Rales, No Rhonchi, No Wheezes     Cardiovascular: S1, S2, Regular Rate and Rhythm     Gastrointestinal: Soft, Nontender, Nondistended     Incision: groin without hematoma    LABS:  10-31    135  |  102  |  15  ----------------------------<  108<H>  3.9   |  25  |  0.81    Ca    8.5      31 Oct 2019 06:00    Hemoglobin A1C, Whole Blood: 5.7 % (10-21 @ 22:34)    DIET: [x] Regular [] CCD [] Renal [] Puree [] Dysphagia [] Tube Feeds:     IMAGING:   < from: MR Head No Cont (10.30.19 @ 11:49) >  INTERPRETATION:    CLINICAL INDICATION: Post web device deployment for anterior   communicating artery aneurysm      Magnetic resonance imaging of the brain was carried out with transaxial   SPGR, FLAIR, fast spin echo T2 weighted images, axial susceptibility   weighted series, diffusion weighted series and sagittal T1 weighted   series on a 1.5 Meera magnet.    Comparison is made with the prior conventional angiogram 10/22/2019 and   MRI 10/23/2019.    The fourth, third and lateral ventricles are normal in size and position.   There is no hemorrhage, mass or shift of the midline structures. No   abnormal signal intensity is identified within the brain parenchyma on   the T1, T2 or FLAIR sequences.  No acute infarcts are seen. Previous subarachnoid hemorrhage has   resolved. There is no hydrocephalus.    A  3-D axial noncontrast MRA were performed on the cervical and   intracranial vessels, respectively. Intravascular flow quantification was   performed using gated 2D phase contrast MR, imaged perpendicular to the   vessel axis.  Images were post processed NOVA software and a NOVA flow   study report is available.     There is slight signal dropout overlying the proximal left A2 segment   consistent with the presence of a Web aneurysm device. Good intracranial   flow is identified. 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    Flow is as follows in milliliters per minute.    TIFFANY 234, RMCA 113, RACA 128, RACA2 57.    LICA 127, LMCA 128, LACA2 52.    RVA 85, LVA 95, , RPCA 53, LPCA 74.      IMPRESSION: Resolving subarachnoid hemorrhage compared with 10/23/2019.   Web device region of the anterior communicating artery aneurysm with good   intracranial flow. No evidence of vasospasm or infarcts.        < end of copied text >

## 2019-11-01 ENCOUNTER — TRANSCRIPTION ENCOUNTER (OUTPATIENT)
Age: 54
End: 2019-11-01

## 2019-11-01 VITALS
RESPIRATION RATE: 18 BRPM | OXYGEN SATURATION: 98 % | HEART RATE: 81 BPM | TEMPERATURE: 98 F | SYSTOLIC BLOOD PRESSURE: 141 MMHG | DIASTOLIC BLOOD PRESSURE: 88 MMHG

## 2019-11-01 LAB
ANION GAP SERPL CALC-SCNC: 9 MMOL/L — SIGNIFICANT CHANGE UP (ref 5–17)
BUN SERPL-MCNC: 18 MG/DL — SIGNIFICANT CHANGE UP (ref 7–23)
CALCIUM SERPL-MCNC: 8.8 MG/DL — SIGNIFICANT CHANGE UP (ref 8.4–10.5)
CHLORIDE SERPL-SCNC: 100 MMOL/L — SIGNIFICANT CHANGE UP (ref 96–108)
CO2 SERPL-SCNC: 25 MMOL/L — SIGNIFICANT CHANGE UP (ref 22–31)
CREAT SERPL-MCNC: 0.87 MG/DL — SIGNIFICANT CHANGE UP (ref 0.5–1.3)
GLUCOSE SERPL-MCNC: 107 MG/DL — HIGH (ref 70–99)
POTASSIUM SERPL-MCNC: 4 MMOL/L — SIGNIFICANT CHANGE UP (ref 3.5–5.3)
POTASSIUM SERPL-SCNC: 4 MMOL/L — SIGNIFICANT CHANGE UP (ref 3.5–5.3)
SODIUM SERPL-SCNC: 134 MMOL/L — LOW (ref 135–145)

## 2019-11-01 PROCEDURE — C1769: CPT

## 2019-11-01 PROCEDURE — 99239 HOSP IP/OBS DSCHRG MGMT >30: CPT

## 2019-11-01 PROCEDURE — 86901 BLOOD TYPING SEROLOGIC RH(D): CPT

## 2019-11-01 PROCEDURE — 97162 PT EVAL MOD COMPLEX 30 MIN: CPT

## 2019-11-01 PROCEDURE — 76380 CAT SCAN FOLLOW-UP STUDY: CPT

## 2019-11-01 PROCEDURE — 36226 PLACE CATH VERTEBRAL ART: CPT

## 2019-11-01 PROCEDURE — A9585: CPT

## 2019-11-01 PROCEDURE — 84550 ASSAY OF BLOOD/URIC ACID: CPT

## 2019-11-01 PROCEDURE — 36228 PLACE CATH INTRACRANIAL ART: CPT

## 2019-11-01 PROCEDURE — 85027 COMPLETE CBC AUTOMATED: CPT

## 2019-11-01 PROCEDURE — 85576 BLOOD PLATELET AGGREGATION: CPT

## 2019-11-01 PROCEDURE — 81003 URINALYSIS AUTO W/O SCOPE: CPT

## 2019-11-01 PROCEDURE — 80048 BASIC METABOLIC PNL TOTAL CA: CPT

## 2019-11-01 PROCEDURE — 80061 LIPID PANEL: CPT

## 2019-11-01 PROCEDURE — 84540 ASSAY OF URINE/UREA-N: CPT

## 2019-11-01 PROCEDURE — 70496 CT ANGIOGRAPHY HEAD: CPT

## 2019-11-01 PROCEDURE — 86850 RBC ANTIBODY SCREEN: CPT

## 2019-11-01 PROCEDURE — 85730 THROMBOPLASTIN TIME PARTIAL: CPT

## 2019-11-01 PROCEDURE — 61624 TCAT PERM OCCLS/EMBOLJ CNS: CPT

## 2019-11-01 PROCEDURE — 99285 EMERGENCY DEPT VISIT HI MDM: CPT | Mod: 25

## 2019-11-01 PROCEDURE — 70544 MR ANGIOGRAPHY HEAD W/O DYE: CPT

## 2019-11-01 PROCEDURE — C1894: CPT

## 2019-11-01 PROCEDURE — 84484 ASSAY OF TROPONIN QUANT: CPT

## 2019-11-01 PROCEDURE — 84560 ASSAY OF URINE/URIC ACID: CPT

## 2019-11-01 PROCEDURE — 82570 ASSAY OF URINE CREATININE: CPT

## 2019-11-01 PROCEDURE — C1887: CPT

## 2019-11-01 PROCEDURE — 36224 PLACE CATH CAROTD ART: CPT

## 2019-11-01 PROCEDURE — 93005 ELECTROCARDIOGRAM TRACING: CPT

## 2019-11-01 PROCEDURE — 99232 SBSQ HOSP IP/OBS MODERATE 35: CPT

## 2019-11-01 PROCEDURE — 96374 THER/PROPH/DIAG INJ IV PUSH: CPT | Mod: XU

## 2019-11-01 PROCEDURE — 70498 CT ANGIOGRAPHY NECK: CPT

## 2019-11-01 PROCEDURE — 85610 PROTHROMBIN TIME: CPT

## 2019-11-01 PROCEDURE — 93306 TTE W/DOPPLER COMPLETE: CPT

## 2019-11-01 PROCEDURE — 84156 ASSAY OF PROTEIN URINE: CPT

## 2019-11-01 PROCEDURE — 97165 OT EVAL LOW COMPLEX 30 MIN: CPT

## 2019-11-01 PROCEDURE — 83735 ASSAY OF MAGNESIUM: CPT

## 2019-11-01 PROCEDURE — 75898 FOLLOW-UP ANGIOGRAPHY: CPT

## 2019-11-01 PROCEDURE — 84100 ASSAY OF PHOSPHORUS: CPT

## 2019-11-01 PROCEDURE — 83036 HEMOGLOBIN GLYCOSYLATED A1C: CPT

## 2019-11-01 PROCEDURE — 75894 X-RAYS TRANSCATH THERAPY: CPT

## 2019-11-01 PROCEDURE — 80053 COMPREHEN METABOLIC PANEL: CPT

## 2019-11-01 PROCEDURE — 84443 ASSAY THYROID STIM HORMONE: CPT

## 2019-11-01 PROCEDURE — 84300 ASSAY OF URINE SODIUM: CPT

## 2019-11-01 PROCEDURE — 83935 ASSAY OF URINE OSMOLALITY: CPT

## 2019-11-01 PROCEDURE — 93888 INTRACRANIAL LIMITED STUDY: CPT

## 2019-11-01 PROCEDURE — 36227 PLACE CATH XTRNL CAROTID: CPT

## 2019-11-01 PROCEDURE — 93886 INTRACRANIAL COMPLETE STUDY: CPT

## 2019-11-01 PROCEDURE — 70553 MRI BRAIN STEM W/O & W/DYE: CPT

## 2019-11-01 PROCEDURE — 93970 EXTREMITY STUDY: CPT

## 2019-11-01 PROCEDURE — 83930 ASSAY OF BLOOD OSMOLALITY: CPT

## 2019-11-01 PROCEDURE — C1889: CPT

## 2019-11-01 PROCEDURE — 86900 BLOOD TYPING SEROLOGIC ABO: CPT

## 2019-11-01 PROCEDURE — 70450 CT HEAD/BRAIN W/O DYE: CPT

## 2019-11-01 PROCEDURE — 90686 IIV4 VACC NO PRSV 0.5 ML IM: CPT

## 2019-11-01 PROCEDURE — 70551 MRI BRAIN STEM W/O DYE: CPT

## 2019-11-01 RX ORDER — AMLODIPINE BESYLATE 2.5 MG/1
1 TABLET ORAL
Qty: 30 | Refills: 0
Start: 2019-11-01

## 2019-11-01 RX ORDER — OXYCODONE HYDROCHLORIDE 5 MG/1
1 TABLET ORAL
Qty: 30 | Refills: 0
Start: 2019-11-01

## 2019-11-01 RX ORDER — ASPIRIN/CALCIUM CARB/MAGNESIUM 324 MG
1 TABLET ORAL
Qty: 30 | Refills: 0
Start: 2019-11-01

## 2019-11-01 RX ORDER — SENNA PLUS 8.6 MG/1
1 TABLET ORAL
Qty: 0 | Refills: 0 | DISCHARGE
Start: 2019-11-01

## 2019-11-01 RX ORDER — POLYETHYLENE GLYCOL 3350 17 G/17G
17 POWDER, FOR SOLUTION ORAL
Qty: 0 | Refills: 0 | DISCHARGE
Start: 2019-11-01

## 2019-11-01 RX ADMIN — SODIUM CHLORIDE 1 GRAM(S): 9 INJECTION INTRAMUSCULAR; INTRAVENOUS; SUBCUTANEOUS at 05:48

## 2019-11-01 RX ADMIN — INFLUENZA VIRUS VACCINE 0.5 MILLILITER(S): 15; 15; 15; 15 SUSPENSION INTRAMUSCULAR at 15:14

## 2019-11-01 RX ADMIN — Medication 325 MILLIGRAM(S): at 05:49

## 2019-11-01 RX ADMIN — AMLODIPINE BESYLATE 5 MILLIGRAM(S): 2.5 TABLET ORAL at 05:48

## 2019-11-01 NOTE — PROGRESS NOTE ADULT - PROVIDER SPECIALTY LIST ADULT
Hospitalist
NSICU
Neurosurgery
Hospitalist

## 2019-11-01 NOTE — DISCHARGE NOTE PROVIDER - HOSPITAL COURSE
54M with no known PMH but has not seen a physician in decades presents with HA and dizziness. Last Mon afternoon started with migrating HA and neck pain. Friday was doing some chores outside and around 10pm noted return of headache, mostly on the top of the head. The pain kept increasing overnight. Couldn't sleep. Pain was the worst he could recall in recent memory. Endorses nausea but no vomiting. Dizziness is positional and worse when he bends over or stands up quickly. Denies neck pain, chest pain, light sensitivity, chest pain, sob, abd pain, diarrhea, rash. Pt endorsed some chills at home but no fever. Denies recent trauma. Did fall and hit his head two years ago, and didn't seek treatment. (21 Oct 2019 17:54)        Admitted on 10/21/19 with worst headache of life and found to have SAH on CT head(subarachnoid hemorrhage).     He underwent a cerebral angiogram and WEB embolization placement for right anterior communicating artery aneurysm on 10/22/19.     He was cared for per protocol in [facility]. Post procedure significant for hyponatremia, thought to be SIADH per renal consult, treated medically     and improved at time of discharge. 54M with no known PMH but has not seen a physician in decades presents with HA and dizziness. Last Mon afternoon started with migrating HA and neck pain. Friday was doing some chores outside and around 10pm noted return of headache, mostly on the top of the head. The pain kept increasing overnight. Couldn't sleep. Pain was the worst he could recall in recent memory. Endorses nausea but no vomiting. Dizziness is positional and worse when he bends over or stands up quickly. Denies neck pain, chest pain, light sensitivity, chest pain, sob, abd pain, diarrhea, rash. Pt endorsed some chills at home but no fever. Denies recent trauma. Did fall and hit his head two years ago, and didn't seek treatment. (21 Oct 2019 17:54)        Admitted on 10/21/19 with worst headache of life and found to have SAH on CT head(subarachnoid hemorrhage).     He underwent a cerebral angiogram and WEB embolization placement for right anterior communicating artery aneurysm on 10/22/19.     He was cared for per protocol in [facility]SC[facility]. Post procedure significant for hyponatremia, thought to be SIADH per renal consult, treated medically with hypertonic IVF and salt tabs,      and improved at time of discharge. PT/OT evaluated him and recommended no skilled needs. He was followed by hospitalist medicine. On the day of dishcarge he was medically and neurologically stable for discharge to home. 54M with no known PMH but has not seen a physician in decades presents with HA and dizziness. Last Mon afternoon started with migrating HA and neck pain. Friday was doing some chores outside and around 10pm noted return of headache, mostly on the top of the head. The pain kept increasing overnight. Couldn't sleep. Pain was the worst he could recall in recent memory. Endorses nausea but no vomiting. Dizziness is positional and worse when he bends over or stands up quickly. Denies neck pain, chest pain, light sensitivity, chest pain, sob, abd pain, diarrhea, rash. Pt endorsed some chills at home but no fever. Denies recent trauma. Did fall and hit his head two years ago, and didn't seek treatment. (21 Oct 2019 17:54)        Admitted on 10/21/19 with worst headache of life and found to have SAH on CT head(subarachnoid hemorrhage).     He underwent a cerebral angiogram and WEB embolization placement for right anterior communicating artery aneurysm on 10/22/19.     He was cared for per protocol in [facility]. Post procedure significant for hyponatremia, thought to be SIADH per renal consult, treated medically with hypertonic IVF and salt tabs,      and improved at time of discharge. PT/OT evaluated him and recommended no skilled needs. He was followed by hospitalist medicine. On the day of dishcarge he was medically and neurologically stable for discharge to home.         More than 30 minutes were spent educating the patient and family regarding condition, medications, follow up plans, signs and symptoms to be concerned with, preparing paperwork, and questions answered regarding discharge.

## 2019-11-01 NOTE — DISCHARGE NOTE PROVIDER - CARE PROVIDERS DIRECT ADDRESSES
,[email or web address]

## 2019-11-01 NOTE — DISCHARGE NOTE PROVIDER - NSDCFUADDINST_GEN_ALL_CORE_FT
please notify physician if fevers, bleeding, swelling, pain not relieved by medication, increased sluggishness or irritability, increased nausea or vomiting, inability to tolerate foods or liquids, new or increasing weakness/numbness/tingling.   please do not engage in strenuous activity, heavy lifting, drive, or return to work or school until cleared by surgeon.

## 2019-11-01 NOTE — PROGRESS NOTE ADULT - PROBLEM SELECTOR PROBLEM 2
Hyponatremia
Hyponatremia
Leukocytosis, unspecified type

## 2019-11-01 NOTE — DISCHARGE NOTE PROVIDER - CARE PROVIDER_API CALL
Vick Jiménez)  Neurological Surgery  [address] [place] [address]  Phone: [phone]  Fax: [phone]  Follow Up Time: Vick Jiménez)  Neurological Surgery  [address] [place] [address]  Phone: [phone]  Fax: [phone]  Follow Up Time:     Shiloh Tucker)  Family Medicine  [address] [place] [address]  Phone: [phone]  Fax: [phone]  Follow Up Time:

## 2019-11-01 NOTE — DISCHARGE NOTE PROVIDER - NSDCFUADDAPPT_GEN_ALL_CORE_FT
Dr Shiloh Tucker 11/11/19 @ 2:30 -16 [facility] [address]. Please call to confirm appointment. Please arrive 30 mins prior with discharge papers, medication list, insurance card and Identification.

## 2019-11-01 NOTE — DISCHARGE NOTE PROVIDER - NSDCFUSCHEDAPPT_GEN_ALL_CORE_FT
STEFAN HOLLINS ; 11/11/2019 ; [facility] [address] [place] STEFAN HOLLINS ; 11/11/2019 ; [facility] [address] [place] STEFAN HOLLINS ; 11/11/2019 ; [facility] [address] STEFAN HOLLINS ; 11/11/2019 ; [facility] [address]

## 2019-11-01 NOTE — PROGRESS NOTE ADULT - ASSESSMENT
HPI:  54M with no known PMH but has not seen a physician in decades presents with HA and dizziness. Last Mon afternoon started with migrating HA and neck pain. Friday was doing some chores outside and around 10pm noted return of headache, mostly on the top of the head. The pain kept increasing overnight. Couldn't sleep. Pain was the worst he could recall in recent memory. Endorses nausea but no vomiting. Dizziness is positional and worse when he bends over or stands up quickly. Denies neck pain, chest pain, light sensitivity, chest pain, sob, abd pain, diarrhea, rash. Pt endorsed some chills at home but no fever. Denies recent trauma. Did fall and hit his head two years ago, and didn't seek treatment. 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 on 10/22/19     PLAN:  1 Out of bed   2 continue physical therapy   3 prn pain meds  4 hyponatremia- stable off 2% NACL IVF  5 nimodipine changed to norvasc and normotensive  6 regular diet   7 stool softeners  8 dispo: no skilled PT/OT needs upon d/c  Hospitalist medicine following    Discussed with patient and family wound care, follow up plans, activity, and medications. Questions answered, and they verbalized understanding. RXs sent to [facility]  medicine appointment made for 11/11/19  d/c IVL and d/c home    will discuss with Dr Jiménez  #[id number]    Assessment:  Please Check When Present   []  GCS  E   V  M     Heart Failure: []Acute, [] acute on chronic , []chronic  Heart Failure:  [] Diastolic (HFpEF), [] Systolic (HFrEF), []Combined (HFpEF and HFrEF), [] RHF, [] Pulm HTN, [] Other    [] TIFFANIE, [] ATN, [] AIN, [] other  [] CKD1, [] CKD2, [] CKD 3, [] CKD 4, [] CKD 5, []ESRD    Encephalopathy: [] Metabolic, [] Hepatic, [] toxic, [] Neurological, [] Other    Abnormal Nurtitional Status: [] malnurtition (see nutrition note), [ ]underweight: BMI < 19, [] morbid obesity: BMI >40, [] Cachexia    [] Sepsis  [] hypovolemic shock,[] cardiogenic shock, [] hemorrhagic shock, [] neuogenic shock  [] Acute Respiratory Failure  []Cerebral edema, [] Brain compression/ herniation,   [] Functional quadriplegia  [] Acute blood loss anemia

## 2019-11-01 NOTE — DISCHARGE NOTE PROVIDER - NSDCCPCAREPLAN_GEN_ALL_CORE_FT
PRINCIPAL DISCHARGE DIAGNOSIS  Diagnosis: Subarachnoid bleed  Assessment and Plan of Treatment: He underwent a cerebral angiogram and WEB embolization placement for right anterior communicating artery aneurysm on 10/22/19.   Dr Jiménez- neurosurgeon- please call office for appointment in 1-2 weeks. Do not stop aspirin for any reason without notifying Dr Jiménez, as it is for your WEB embolization.   Primary Care Physician- Dr Tucker- appointment on 11/11/19.      SECONDARY DISCHARGE DIAGNOSES  Diagnosis: Hyponatremia  Assessment and Plan of Treatment: resolved at time of discharge. Likely due to SIADH. Please follow up with Dr Tucker as below.    Diagnosis: Essential hypertension  Assessment and Plan of Treatment: Please continue medications and follow up with your primary care physician Dr Tucker as below. You were started on norvasc.

## 2019-11-01 NOTE — PROGRESS NOTE ADULT - PROBLEM SELECTOR PLAN 2
Na improving  Normal TSH  /Urine studies is consistent with SIADH   Pt is on hypertonic saline ( 2 % ) wean per NSGY  salt tabs
NO fever / no Tachycardia   Pt was on Decadron for 3 days which was stopped   Improving WBC   cont to monitor off of ABX and monitor for any fever , any local or systemic evidence of infection
NO fever / no Tachycardia   Pt was on Decadron for 3 days which was stopped one day ago   Improving WBC   cont to monitor off of ABX and monitor for any fever , any local or systemic evidence of infection
NO fever / no Tachycardia   Pt was on Decadron for 3 days which was stopped one day ago   Improving WBC   cont to monitor off of ABX and monitor for any fever , any local or systemic evidence of infection
cont to monitor off of ABX and monitor for any fever , any local or systemic evidence of infection
cont to monitor off of ABX and monitor for any fever , any local or systemic evidence of infection, of which there is none presently
resolved

## 2019-11-01 NOTE — DISCHARGE NOTE PROVIDER - PROVIDER TOKENS
PROVIDER:[TOKEN:[[id number]]] PROVIDER:[TOKEN:[[id number]]],PROVIDER:[TOKEN:[[id number]]]

## 2019-11-01 NOTE — PROGRESS NOTE ADULT - PROBLEM SELECTOR PLAN 3
On  nimodipine 30 q2.   cont to monitor BP  transition to Norvasc 5mg when nimodipine weaned  needs PMD - refer to [facility] [phone]    if appt not made would have pt call [phone] to make appt w/primary care
Na worsening  Normal TSH  /Urine studies is consistent with SIADH   Pt is on hypertonic saline ( 2 % ) adjust per NSGY  salt tabs
Normal TSH   Urine studies is consistent with SIADH   Fluctuating Serum sodium   Pt is on hypertonic saline ( 2 % ) @50ml/hour  Pt is currently on regular diet / might consider placing patient on  Fluid restriction  and SALT tablet oral .
Normal TSH   Urine studies is consistent with SIADH   Fluctuating Serum sodium   Pt is on hypertonic saline ( 2 % ) @50ml/hour  Pt is currently on regular diet / might consider placing patient on  Fluid restriction  and SALT tablet oral .
Resolved and normal TSH   Urine studies is consistent with SIADH   Pt is on hypertonic saline ( 2 % ) @50ml/hour  I would suggest repeating the BMP this afternoon and if Na remains stable , to dc the hypertonic saline and start on Fluid restriction .
improving Na  Normal TSH  /Urine studies is consistent with SIADH   Pt is on hypertonic saline ( 2 % ) @50ml/hour wean per NSGY
transitioned to Norvasc 5mg when nimodipine weaned  PMD appt made by me Dr Shiloh Tucker 11/11/19 @ 2:30 -16 [facility] [address]

## 2019-11-01 NOTE — DISCHARGE NOTE PROVIDER - NSDCMRMEDTOKEN_GEN_ALL_CORE_FT
amLODIPine 5 mg oral tablet: 1 tab(s) orally once a day  aspirin 325 mg oral delayed release tablet: 1 tab(s) orally once a day   oxyCODONE 5 mg oral tablet: 1-2 tab(s) orally every 4 hours, As needed, pain MDD:6  polyethylene glycol 3350 oral powder for reconstitution: 17 gram(s) orally once a day  senna oral tablet: 1 tab(s) orally once a day

## 2019-11-01 NOTE — DISCHARGE NOTE PROVIDER - REASON FOR ADMISSION
admitted 10/21/19 with worst headache of life and found to have SAH on CT head(subarachnoid hemorrhage). s/p cerebral angiogram and WEB embolization placement for right anterior communicating artery aneurysm on 10/22/19. Resolving hyponatremia. 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 Admitted on 10/21/19 with worst headache of life and found to have SAH on CT head(subarachnoid hemorrhage).   He underwent a cerebral angiogram and WEB embolization placement for right anterior communicating artery aneurysm on 10/22/19.

## 2019-11-01 NOTE — DISCHARGE NOTE PROVIDER - NSDCACTIVITY_GEN_ALL_CORE
Bathing allowed/Walking - Indoors allowed/Do not make important decisions/Showering allowed/Stairs allowed/Do not drive or operate machinery/No heavy lifting/straining/Walking - Outdoors allowed

## 2019-11-01 NOTE — PROGRESS NOTE ADULT - SUBJECTIVE AND OBJECTIVE BOX
[facility] Division of Hospital Medicine  Saturnino Conley MD  Pager (M-F, 4N-9M): [phone]  Other Times:  [phone]    Patient is a 54y old  Male who presents with a chief complaint of JOSH, R acomm (01 Nov 2019 08:27)      SUBJECTIVE / OVERNIGHT EVENTS: no new complaints wife at bedside  ADDITIONAL REVIEW OF SYSTEMS:    MEDICATIONS  (STANDING):  amLODIPine   Tablet 5 milliGRAM(s) Oral daily  aspirin enteric coated 325 milliGRAM(s) Oral <User Schedule>  enoxaparin Injectable 40 milliGRAM(s) SubCutaneous <User Schedule>  influenza   Vaccine 0.5 milliLiter(s) IntraMuscular once  polyethylene glycol 3350 17 Gram(s) Oral daily  senna 1 Tablet(s) Oral daily  sodium chloride 1 Gram(s) Oral every 12 hours  tamsulosin 0.4 milliGRAM(s) Oral at bedtime    MEDICATIONS  (PRN):  oxyCODONE    IR 5 milliGRAM(s) Oral every 4 hours PRN Moderate Pain (4 - 6)  oxyCODONE    IR 10 milliGRAM(s) Oral every 4 hours PRN Severe Pain (7 - 10)      CAPILLARY BLOOD GLUCOSE        I&O's Summary    31 Oct 2019 07:01  -  01 Nov 2019 07:00  --------------------------------------------------------  IN: 840 mL / OUT: 0 mL / NET: 840 mL        PHYSICAL EXAM:  Vital Signs Last 24 Hrs  T(C): 36.6 (01 Nov 2019 09:07), Max: 37.1 (31 Oct 2019 19:37)  T(F): 97.8 (01 Nov 2019 09:07), Max: 98.7 (31 Oct 2019 19:37)  HR: 81 (01 Nov 2019 09:07) (72 - 82)  BP: 141/88 (01 Nov 2019 09:07) (119/78 - 141/98)  BP(mean): --  RR: 18 (01 Nov 2019 09:07) (17 - 18)  SpO2: 98% (01 Nov 2019 09:07) (96% - 99%)  GENERAL: Alert. Not confused. No acute distress. Not thin. Not cachectic. Not obese.  CARDIAC: RRR  LUNG/CHEST: CTAB. BS equal bilaterally. No wheezes. No rales. No rhonchi.  ABDOMEN: Soft. No tenderness. No distension. No fluid wave. Normal bowel sounds.  EXTREMITIES:  No clubbing. No cyanosis. No edema. Moving all 4.  NEUROLOGY: A&Ox3. Non-focal exam.   SKIN: vitiligo skin changes    LABS:    11-01    134<L>  |  100  |  18  ----------------------------<  107<H>  4.0   |  25  |  0.87    Ca    8.8      01 Nov 2019 05:23                  RADIOLOGY & ADDITIONAL TESTS:  Results Reviewed:   Imaging Personally Reviewed:  Electrocardiogram Personally Reviewed:    COORDINATION OF CARE:  Care Discussed with Consultants/Other Providers [Y/N]: KAYLA Lyman re dispo plan, outpatient PMD appt made by me  Prior or Outpatient Records Reviewed [Y/N]:

## 2019-11-01 NOTE — PROGRESS NOTE ADULT - PROBLEM SELECTOR PROBLEM 1
Subarachnoid bleed

## 2019-11-01 NOTE — PROGRESS NOTE ADULT - SUBJECTIVE AND OBJECTIVE BOX
SUBJECTIVE: patient ambulating halls, denies HAs, anxious to go home    Vital Signs Last 24 Hrs  T(C): 36.6 (01 Nov 2019 09:07), Max: 37.1 (31 Oct 2019 19:37)  T(F): 97.8 (01 Nov 2019 09:07), Max: 98.7 (31 Oct 2019 19:37)  HR: 81 (01 Nov 2019 09:07) (73 - 82)  BP: 141/88 (01 Nov 2019 09:07) (128/86 - 141/98)  BP(mean): --  RR: 18 (01 Nov 2019 09:07) (17 - 18)  SpO2: 98% (01 Nov 2019 09:07) (96% - 98%)    PHYSICAL EXAM:    General: No Acute Distress     Neurological: Awake, alert oriented to person, place and time, Following Commands, PERRL, EOMI, Face Symmetrical, Speech Fluent, Moving all extremities, Muscle Strength normal in all four extremities,   No Drift, Sensation to Light Touch Intact    Pulmonary: Clear to Auscultation, No Rales, No Rhonchi, No Wheezes     Cardiovascular: S1, S2, Regular Rate and Rhythm     Gastrointestinal: Soft, Nontender, Nondistended     Incision: groin without hematoma    LABS:  11-01    134<L>  |  100  |  18  ----------------------------<  107<H>  4.0   |  25  |  0.87    Ca    8.8      01 Nov 2019 05:23    Hemoglobin A1C, Whole Blood: 5.7 % (10-21 @ 22:34)    DIET: [x] Regular [] CCD [] Renal [] Puree [] Dysphagia [] Tube Feeds:     IMAGING:   < from: MR Head No Cont (10.30.19 @ 11:49) >  INTERPRETATION:    CLINICAL INDICATION: Post web device deployment for anterior   communicating artery aneurysm      Magnetic resonance imaging of the brain was carried out with transaxial   SPGR, FLAIR, fast spin echo T2 weighted images, axial susceptibility   weighted series, diffusion weighted series and sagittal T1 weighted   series on a 1.5 Meera magnet.    Comparison is made with the prior conventional angiogram 10/22/2019 and   MRI 10/23/2019.    The fourth, third and lateral ventricles are normal in size and position.   There is no hemorrhage, mass or shift of the midline structures. No   abnormal signal intensity is identified within the brain parenchyma on   the T1, T2 or FLAIR sequences.  No acute infarcts are seen. Previous subarachnoid hemorrhage has   resolved. There is no hydrocephalus.    A  3-D axial noncontrast MRA were performed on the cervical and   intracranial vessels, respectively. Intravascular flow quantification was   performed using gated 2D phase contrast MR, imaged perpendicular to the   vessel axis.  Images were post processed NOVA software and a NOVA flow   study report is available.     There is slight signal dropout overlying the proximal left A2 segment   consistent with the presence of a Web aneurysm device. Good intracranial   flow is identified. 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    Flow is as follows in milliliters per minute.    TIFFANY 234, RMCA 113, RACA 128, RACA2 57.    LICA 127, LMCA 128, LACA2 52.    RVA 85, LVA 95, , RPCA 53, LPCA 74.      IMPRESSION: Resolving subarachnoid hemorrhage compared with 10/23/2019.   Web device region of the anterior communicating artery aneurysm with good   intracranial flow. No evidence of vasospasm or infarcts.        < end of copied text >

## 2019-11-01 NOTE — PROGRESS NOTE ADULT - REASON FOR ADMISSION
JOSH, R acomm
Patient was admitted with subarachnoid hemorrhage.
Patient was admitted with subarachnoid hemorrhage.
subarachnoid hemorrhage
JOSH, R acomm

## 2019-11-11 ENCOUNTER — APPOINTMENT (OUTPATIENT)
Dept: NEUROSURGERY | Facility: CLINIC | Age: 54
End: 2019-11-11
Payer: COMMERCIAL

## 2019-11-11 ENCOUNTER — APPOINTMENT (OUTPATIENT)
Dept: FAMILY MEDICINE | Facility: CLINIC | Age: 54
End: 2019-11-11

## 2019-11-11 DIAGNOSIS — Z86.79 PERSONAL HISTORY OF OTHER DISEASES OF THE CIRCULATORY SYSTEM: ICD-10-CM

## 2019-11-11 PROCEDURE — 99214 OFFICE O/P EST MOD 30 MIN: CPT

## 2019-11-11 NOTE — PHYSICAL EXAM
[General Appearance - Alert] : alert [General Appearance - In No Acute Distress] : in no acute distress [Person] : oriented to person [Place] : oriented to place [Time] : oriented to time [Cranial Nerves Optic (II)] : visual acuity intact bilaterally,  pupils equal round and reactive to light [Cranial Nerves Facial (VII)] : face symmetrical [Cranial Nerves Trigeminal (V)] : facial sensation intact symmetrically [Cranial Nerves Oculomotor (III)] : extraocular motion intact [Cranial Nerves Vestibulocochlear (VIII)] : hearing was intact bilaterally [Cranial Nerves Accessory (XI - Cranial And Spinal)] : head turning and shoulder shrug symmetric [Cranial Nerves Glossopharyngeal (IX)] : tongue and palate midline [Motor Strength] : muscle strength was normal in all four extremities [Involuntary Movements] : no involuntary movements were seen [Cranial Nerves Hypoglossal (XII)] : there was no tongue deviation with protrusion [] : no respiratory distress [Abnormal Walk] : normal gait

## 2019-11-12 NOTE — REASON FOR VISIT
[Follow-Up: _____] : a [unfilled] follow-up visit [Family Member] : family member [FreeTextEntry1] : Regina Jarrett is a 54yr old male here for a hospital follow up visit. He was having headaches that increasingly got worse and came to [facility] ER 10/21/2019.  CT showed he had a subarachnoid hemorrhage HH1 MF1. He underwent endovascular embolization of ruptured ACOMM aneurysm with WEB 10/22/2019. He tolerated the procedure well. His hospital course was not complicated by vasospasm. He was discharged home when stable. Since he has been home he has been feeling well.

## 2019-11-12 NOTE — ASSESSMENT
[FreeTextEntry1] : Impression: 54yr old male SAH HH! MF 1 ruptured ACOMM aneurysm treated with WEB 10/22/2019\par \par Plan:\par Patient feels he is 100% back to normal\par Reviewed patients post procedure imaging shows the ACOMM aneurysm is well occluded \par Ok to go back to work\par MRI brain in three months February 2020\par MRA brain in three months February 2020\par Continue aspirin 325mg daily \par Follow up in the office after the MRI and MRA is done.

## 2019-11-15 ENCOUNTER — APPOINTMENT (OUTPATIENT)
Dept: FAMILY MEDICINE | Facility: CLINIC | Age: 54
End: 2019-11-15
Payer: COMMERCIAL

## 2019-11-15 VITALS
BODY MASS INDEX: 26.07 KG/M2 | HEIGHT: 68 IN | WEIGHT: 172 LBS | OXYGEN SATURATION: 99 % | DIASTOLIC BLOOD PRESSURE: 95 MMHG | SYSTOLIC BLOOD PRESSURE: 129 MMHG | HEART RATE: 99 BPM

## 2019-11-15 VITALS — TEMPERATURE: 98.8 F

## 2019-11-15 VITALS — SYSTOLIC BLOOD PRESSURE: 120 MMHG | DIASTOLIC BLOOD PRESSURE: 90 MMHG

## 2019-11-15 PROCEDURE — 99203 OFFICE O/P NEW LOW 30 MIN: CPT

## 2019-11-15 RX ORDER — ASPIRIN 325 MG/1
TABLET, FILM COATED ORAL
Refills: 0 | Status: COMPLETED | COMMUNITY

## 2019-11-15 NOTE — HISTORY OF PRESENT ILLNESS
[FreeTextEntry8] : cc-- hospital f/u\par \par 55 yo M with rutured cerebral aneurysm recently discharged from [facility]. He reported headache that was worse in his life and was different than other headaches. He went to ER on 10/21/19, dx with SAH. He underwent WEB the following day. He has been doing well since. He has had follow up with neurosurgeon earlier this week. He was dc on amlodipine 5mg. Since dc, he has run out of amlodipine and has been off for 1 week. BPs have been ok. He had EKG and Echo done in hospital that was normal. He continues to take aspirin 325mg. He denies any bleeding/easy bruising/stomach discomfort.\par \par Prior to this incident, he has not seen a doctor since [place] came to the [place] ago. Aside from elevated BP in the hospital, he had lipids and a1c checked and was normal. He does have some stress in his life. He does not smoke or do drugs but he does drink alcohol He used to drink heavy liquor but has stopped that. He still drinks beer but drinks few on occasion with his friends/family.

## 2019-11-15 NOTE — PHYSICAL EXAM
[Normal Gait] : normal gait [Normal] : affect was normal and insight and judgment were intact [de-identified] : extensive hypopigmentation throughout body.

## 2019-11-22 ENCOUNTER — OTHER (OUTPATIENT)
Age: 54
End: 2019-11-22

## 2020-01-14 DIAGNOSIS — K27.9 PEPTIC ULCER, SITE UNSPECIFIED, UNSPECIFIED AS ACUTE OR CHRONIC, W/OUT HEMORRHAGE OR PERFORATION: ICD-10-CM

## 2020-01-14 RX ORDER — PANTOPRAZOLE 40 MG/1
40 TABLET, DELAYED RELEASE ORAL DAILY
Qty: 30 | Refills: 2 | Status: ACTIVE | COMMUNITY
Start: 2020-01-14 | End: 1900-01-01

## 2020-02-11 ENCOUNTER — APPOINTMENT (OUTPATIENT)
Dept: MRI IMAGING | Facility: CLINIC | Age: 55
End: 2020-02-11
Payer: COMMERCIAL

## 2020-02-11 ENCOUNTER — OUTPATIENT (OUTPATIENT)
Dept: OUTPATIENT SERVICES | Facility: HOSPITAL | Age: 55
LOS: 1 days | End: 2020-02-11
Payer: COMMERCIAL

## 2020-02-11 DIAGNOSIS — Z00.8 ENCOUNTER FOR OTHER GENERAL EXAMINATION: ICD-10-CM

## 2020-02-11 PROCEDURE — 70544 MR ANGIOGRAPHY HEAD W/O DYE: CPT

## 2020-02-11 PROCEDURE — 70551 MRI BRAIN STEM W/O DYE: CPT | Mod: 26

## 2020-02-11 PROCEDURE — 70551 MRI BRAIN STEM W/O DYE: CPT

## 2020-02-13 ENCOUNTER — APPOINTMENT (OUTPATIENT)
Dept: NEUROSURGERY | Facility: CLINIC | Age: 55
End: 2020-02-13
Payer: COMMERCIAL

## 2020-02-13 VITALS
HEART RATE: 84 BPM | DIASTOLIC BLOOD PRESSURE: 89 MMHG | SYSTOLIC BLOOD PRESSURE: 134 MMHG | BODY MASS INDEX: 26.07 KG/M2 | RESPIRATION RATE: 18 BRPM | HEIGHT: 68 IN | WEIGHT: 172 LBS | TEMPERATURE: 97.5 F | OXYGEN SATURATION: 96 %

## 2020-02-13 DIAGNOSIS — I67.1 CEREBRAL ANEURYSM, NONRUPTURED: ICD-10-CM

## 2020-02-13 DIAGNOSIS — I60.9 NONTRAUMATIC SUBARACHNOID HEMORRHAGE, UNSPECIFIED: ICD-10-CM

## 2020-02-13 PROCEDURE — 99213 OFFICE O/P EST LOW 20 MIN: CPT

## 2020-02-13 NOTE — PHYSICAL EXAM
[General Appearance - In No Acute Distress] : in no acute distress [General Appearance - Alert] : alert [Person] : oriented to person [Time] : oriented to time [Place] : oriented to place [Motor Strength] : muscle strength was normal in all four extremities [Involuntary Movements] : no involuntary movements were seen [Abnormal Walk] : normal gait [] : no respiratory distress

## 2020-02-19 NOTE — ASSESSMENT
[FreeTextEntry1] : Impression: 54yr old male SAH HH! MF 1 ruptured ACOMM aneurysm treated with WEB 10/22/2019\par \par Plan:\par MRI brain shows resolution of prior subarachnoid hemorrhage\par MRA brain shows aneurysm remains embolized. \par Try taking aspirin 81mg daily instead of the 325mg and that should reduce the gastritis\par Cerebral angiogram May 2020. The risks, benefits, alternatives, complications and personnel associated with the procedure were discussed with the patient and the family in great detail.  They request that we proceed.\par

## 2020-02-19 NOTE — RESULTS/DATA
[FreeTextEntry1] : EXAM: MR ANGIO BRAIN \par \par EXAM: MR BRAIN \par \par \par PROCEDURE DATE: 02/11/2020 \par \par \par \par INTERPRETATION: Clinical indication: Subarachnoid hemorrhage and aneurysm. \par \par MRI of the brain was performed using sagittal T1, axial T1 T2 T2 FLAIR \par diffusion and susceptibility weighted sequence. \par \par MRA of the [place] was performed using 3-D [place] technique \par \par This exam is compared with prior MRI of the brain and MRA of the [place] of \par Medel performed on October 30, 2019 and October 23, 2019. \par \par Previously noted subarachnoid hemorrhage is no longer seen. \par \par Old lacunar infarct involving the left cerebellar region is identified. \par \par Subcentimeter foci of T2 prolongation in periventricular white matter region \par is identified. These findings are nonspecific and have been described to be \par associated with migraine headaches though other possibilities include areas \par of infection inflammation ischemia or demyelinating disease (less likely). \par Clinical correlation is recommended. Continued close interval follow-up can \par be done if clinically indicated. \par \par Evaluation of the diffusion weighted sequence demonstrates no abnormal areas \par of restricted diffusion to suggest acute infarct. \par \par There are no abnormal intra or extra collections are seen \par \par The large vessels demonstrate normal flow voids. \par \par Bilateral maxillary ethmoid and left frontal sinus mucosal thickening is \par seen. Right nasal polyp versus retention cyst is suspected. Direct \par visualization can be done for further evaluation. \par \par Both distal internal carotid arteries appear normal. Hypoplastic changes are \par seen involving the left M1 segment. There is evidence of artifact seen in \par the Acomm region which is likely due to embolic material. No residual or \par recurrent aneurysm is seen in this region. Evaluation of both middle \par cerebral, basilar and posterior cerebral arteries appear normal. \par \par Impression: Previously noted subarachnoid hemorrhage is no longer seen. \par \par MRA of the [place] appears stable. \par \par \par \par \par \par \par \par \par \par ANDREA VERA M.D., ATTENDING RADIOLOGIST \par This document has been electronically signed. Feb 12 2020 3:05PM \par \par \par \par

## 2020-02-19 NOTE — REASON FOR VISIT
[Spouse] : spouse [FreeTextEntry1] : Regina Jarrett is a 54yr old male here for a hospital follow up visit. He was having headaches that increasingly got worse and came to [facility] ER 10/21/2019. CT showed he had a subarachnoid hemorrhage HH1 MF1. He underwent endovascular embolization of ruptured ACOMM aneurysm with WEB 10/22/2019. He tolerated the procedure well. His hospital course was not complicated by vasospasm. He was discharged home when stable. Today he has a chief complaint of headaches. The headaches are moderate in intensity and he takes tylenol as needed. The aspirin was upsetting his stomach and he stopped taking it. \par

## 2020-02-19 NOTE — CONSULT LETTER
[Consult Letter:] : I had the pleasure of evaluating your patient, [unfilled]. [Consult Closing:] : Thank you very much for allowing me to participate in the care of this patient.  If you have any questions, please do not hesitate to contact me. [FreeTextEntry1] : As you know he is a 54yr old male who was having headaches that increasingly got worse and came to [facility] ER 10/21/2019. CT showed he had a subarachnoid hemorrhage HH1 MF1. He underwent endovascular embolization of ruptured ACOMM aneurysm with WEB 10/22/2019. He tolerated the procedure well. His hospital course was not complicated by vasospasm. He was discharged home in stable condition. On follow up he has returned to his baseline and is neurologically intact.  I reviewed his follow up MRI brain which shows resolution of prior subarachnoid hemorrhage, and MRA brain which shows the aneurysm remains embolized. \par I recommend try taking aspirin 81mg daily instead of the 325mg and that should reduce the gastritis.  Repeat cerebral angiogram May 2020. The risks, benefits, alternatives, complications and personnel associated with the procedure were discussed with the patient and the family in great detail. They request that we proceed. [FreeTextEntry3] : \par Sincerely,\par \par Vick Jiménez MD\par Professor of Neurological Surgery and Radiology\par  Department of Neurosurgery\par Director Cerebrovascular Surgery\par [facility] [employer] [facility]\par

## 2020-06-09 ENCOUNTER — OUTPATIENT (OUTPATIENT)
Dept: OUTPATIENT SERVICES | Facility: HOSPITAL | Age: 55
LOS: 1 days | End: 2020-06-09
Payer: COMMERCIAL

## 2020-06-09 VITALS
TEMPERATURE: 98 F | SYSTOLIC BLOOD PRESSURE: 145 MMHG | RESPIRATION RATE: 16 BRPM | DIASTOLIC BLOOD PRESSURE: 85 MMHG | HEART RATE: 66 BPM | HEIGHT: 67 IN | WEIGHT: 175.05 LBS | OXYGEN SATURATION: 99 %

## 2020-06-09 DIAGNOSIS — Z86.79 PERSONAL HISTORY OF OTHER DISEASES OF THE CIRCULATORY SYSTEM: ICD-10-CM

## 2020-06-09 DIAGNOSIS — I67.1 CEREBRAL ANEURYSM, NONRUPTURED: ICD-10-CM

## 2020-06-09 DIAGNOSIS — Z98.890 OTHER SPECIFIED POSTPROCEDURAL STATES: Chronic | ICD-10-CM

## 2020-06-09 DIAGNOSIS — Z01.818 ENCOUNTER FOR OTHER PREPROCEDURAL EXAMINATION: ICD-10-CM

## 2020-06-09 LAB
ANION GAP SERPL CALC-SCNC: 11 MMOL/L — SIGNIFICANT CHANGE UP (ref 5–17)
BLD GP AB SCN SERPL QL: NEGATIVE — SIGNIFICANT CHANGE UP
BUN SERPL-MCNC: 17 MG/DL — SIGNIFICANT CHANGE UP (ref 7–23)
CALCIUM SERPL-MCNC: 8.9 MG/DL — SIGNIFICANT CHANGE UP (ref 8.4–10.5)
CHLORIDE SERPL-SCNC: 106 MMOL/L — SIGNIFICANT CHANGE UP (ref 96–108)
CO2 SERPL-SCNC: 19 MMOL/L — LOW (ref 22–31)
CREAT SERPL-MCNC: 0.79 MG/DL — SIGNIFICANT CHANGE UP (ref 0.5–1.3)
GLUCOSE SERPL-MCNC: 101 MG/DL — HIGH (ref 70–99)
HCT VFR BLD CALC: 45.6 % — SIGNIFICANT CHANGE UP (ref 39–50)
HGB BLD-MCNC: 15.8 G/DL — SIGNIFICANT CHANGE UP (ref 13–17)
MCHC RBC-ENTMCNC: 29.9 PG — SIGNIFICANT CHANGE UP (ref 27–34)
MCHC RBC-ENTMCNC: 34.6 GM/DL — SIGNIFICANT CHANGE UP (ref 32–36)
MCV RBC AUTO: 86.2 FL — SIGNIFICANT CHANGE UP (ref 80–100)
NRBC # BLD: 0 /100 WBCS — SIGNIFICANT CHANGE UP (ref 0–0)
PLATELET # BLD AUTO: 175 K/UL — SIGNIFICANT CHANGE UP (ref 150–400)
POTASSIUM SERPL-MCNC: 4.2 MMOL/L — SIGNIFICANT CHANGE UP (ref 3.5–5.3)
POTASSIUM SERPL-SCNC: 4.2 MMOL/L — SIGNIFICANT CHANGE UP (ref 3.5–5.3)
RBC # BLD: 5.29 M/UL — SIGNIFICANT CHANGE UP (ref 4.2–5.8)
RBC # FLD: 13.2 % — SIGNIFICANT CHANGE UP (ref 10.3–14.5)
RH IG SCN BLD-IMP: POSITIVE — SIGNIFICANT CHANGE UP
SODIUM SERPL-SCNC: 136 MMOL/L — SIGNIFICANT CHANGE UP (ref 135–145)
WBC # BLD: 4.67 K/UL — SIGNIFICANT CHANGE UP (ref 3.8–10.5)
WBC # FLD AUTO: 4.67 K/UL — SIGNIFICANT CHANGE UP (ref 3.8–10.5)

## 2020-06-09 PROCEDURE — 86850 RBC ANTIBODY SCREEN: CPT

## 2020-06-09 PROCEDURE — U0003: CPT

## 2020-06-09 PROCEDURE — 85027 COMPLETE CBC AUTOMATED: CPT

## 2020-06-09 PROCEDURE — 86901 BLOOD TYPING SEROLOGIC RH(D): CPT

## 2020-06-09 PROCEDURE — 86900 BLOOD TYPING SEROLOGIC ABO: CPT

## 2020-06-09 PROCEDURE — 80048 BASIC METABOLIC PNL TOTAL CA: CPT

## 2020-06-09 PROCEDURE — G0463: CPT

## 2020-06-09 NOTE — H&P PST ADULT - NSICDXPASTMEDICALHX_GEN_ALL_CORE_FT
PAST MEDICAL HISTORY:  Vitiligo PAST MEDICAL HISTORY:  Ruptured cerebral aneurysm 10/21/19    Vitiligo

## 2020-06-09 NOTE — H&P PST ADULT - HISTORY OF PRESENT ILLNESS
55 yo male, came to ER 10/21/19 with terrible headache, CT revealed subarachnoid hemorrhage, s/p endovascular embolization of ruptured ACOMM aneurysm with WEB on 10/22/19. Pt. feels well, does not have any neuro-deficits  or headaches, had to stop aspirin 2/2 GI bleed. Pt. presents to [facility] for follow up Cerebral Angiogram on 6/11/20. Pt. denies illness during pandemic, denies close contact with anyone that has been ill, no fever, cough, dyspnea in past two weeks.  COVID-19 test done today at PST 53 yo male, came to ER 10/21/19 with terrible headache, CT revealed subarachnoid hemorrhage, s/p endovascular embolization of ruptured ACOMM aneurysm with WEB on 10/22/19. Pt. feels well, does not have any neuro-deficits or headaches, had to stop aspirin 2/2 GI bleed. Pt. presents to [facility] for follow up Cerebral Angiogram on 6/11/20. Pt. denies illness during pandemic, denies close contact with anyone that has been ill, no fever, cough, dyspnea in past two weeks.  COVID-19 test done today at PST

## 2020-06-09 NOTE — H&P PST ADULT - NSICDXPASTSURGICALHX_GEN_ALL_CORE_FT
PAST SURGICAL HISTORY:  No significant past surgical history PAST SURGICAL HISTORY:  S/P cerebral aneurysm repair embolization with WEB 10/22/19 ([facility])

## 2020-06-09 NOTE — H&P PST ADULT - NSICDXPROBLEM_GEN_ALL_CORE_FT
PROBLEM DIAGNOSES  Problem: History of cerebral aneurysm  Assessment and Plan: Cerebral angiogram, follow up  Pre-op instructions, including Chlorhexidine soap, provided

## 2020-06-09 NOTE — H&P PST ADULT - NEGATIVE NEUROLOGICAL SYMPTOMS
no weakness/no headache/no syncope/no vertigo/no loss of sensation/no difficulty walking/no confusion

## 2020-06-10 LAB — SARS-COV-2 RNA SPEC QL NAA+PROBE: SIGNIFICANT CHANGE UP

## 2020-06-11 ENCOUNTER — RESULT REVIEW (OUTPATIENT)
Age: 55
End: 2020-06-11

## 2020-06-11 ENCOUNTER — APPOINTMENT (OUTPATIENT)
Dept: NEUROSURGERY | Facility: HOSPITAL | Age: 55
End: 2020-06-11
Payer: COMMERCIAL

## 2020-06-11 ENCOUNTER — OUTPATIENT (OUTPATIENT)
Dept: OUTPATIENT SERVICES | Facility: HOSPITAL | Age: 55
LOS: 1 days | End: 2020-06-11
Payer: COMMERCIAL

## 2020-06-11 DIAGNOSIS — Z01.818 ENCOUNTER FOR OTHER PREPROCEDURAL EXAMINATION: ICD-10-CM

## 2020-06-11 DIAGNOSIS — I67.1 CEREBRAL ANEURYSM, NONRUPTURED: ICD-10-CM

## 2020-06-11 DIAGNOSIS — Z98.890 OTHER SPECIFIED POSTPROCEDURAL STATES: Chronic | ICD-10-CM

## 2020-06-11 PROBLEM — I60.7 NONTRAUMATIC SUBARACHNOID HEMORRHAGE FROM UNSPECIFIED INTRACRANIAL ARTERY: Chronic | Status: ACTIVE | Noted: 2020-06-09

## 2020-06-11 PROCEDURE — C1887: CPT

## 2020-06-11 PROCEDURE — C1894: CPT

## 2020-06-11 PROCEDURE — C1769: CPT

## 2020-06-11 PROCEDURE — 36224 PLACE CATH CAROTD ART: CPT

## 2020-06-11 PROCEDURE — 36224 PLACE CATH CAROTD ART: CPT | Mod: RT

## 2020-06-11 RX ORDER — SODIUM CHLORIDE 9 MG/ML
1000 INJECTION INTRAMUSCULAR; INTRAVENOUS; SUBCUTANEOUS
Refills: 0 | Status: DISCONTINUED | OUTPATIENT
Start: 2020-06-11 | End: 2020-06-26

## 2020-06-11 NOTE — CHART NOTE - NSCHARTNOTEFT_GEN_A_CORE
Interventional Neuro- Radiology   Procedure Note      Procedure: Selective Cerebral Angiography   Pre- Procedure Diagnosis: ACOMM aneurysm s/p WEB   Post- Procedure Diagnosis:    : Dr. Tino MD  Resident: Dr. Vijay MD   Physician Assistant: Shelley Rae PA-C    RN: Teresa   Tech: Mi     Anesthesia: (MAC)      I/Os:  Fluids: DTV   Contrast:  Estimated Blood Loss: <10cc    Preliminary Report:  Under MAC, using a 5Fr short sheath to the right groin examination of left vertebral artery/ left internal carotid artery/ left external carotid artery/ right vertebral artery/ right internal carotid artery/ right external carotid artery via selective cerebral angiography demonstrates ________. ( Official note to follow).    Patient tolerated procedure well, vital signs stable, hemodynamically stable, no change in neurological status compared to baseline. Results discussed with neurosurgery/ patient and their family. Groin sheath d/c'ed, manual compression held to hemostasis, no active bleeding, no hematoma, quick clot and safeguard balloon dressing applied at _____h. Patient transferred to IR recovery for further care/ monitoring. Interventional Neuro- Radiology   Procedure Note      Procedure: Selective Cerebral Angiography   Pre- Procedure Diagnosis: ACOMM aneurysm s/p WEB   Post- Procedure Diagnosis: Complete persistent occlusion of aneurysm     : Dr. Tino MD  Resident: Dr. Vijay MD   Physician Assistant: Shelley Rae PA-C    RN: Teresa   Tech: Mi     Anesthesia: Dr. EDWIN Del Valle (MAC)      I/Os:  Fluids: 300cc DTV   Contrast: 18cc   Estimated Blood Loss: <10cc    Preliminary Report:  Under MAC, using a 5Fr short sheath to the right groin examination of right internal carotid artery via selective cerebral angiography demonstrates complete persistent occlusion of aneurysm. ( Official note to follow).    Patient tolerated procedure well, vital signs stable, hemodynamically stable, no change in neurological status compared to baseline. Results discussed with neurosurgery/ patient and their family. Groin sheath d/c'ed, manual compression held to hemostasis, no active bleeding, no hematoma, quick clot and safeguard balloon dressing applied at 9:15h. Patient transferred to IR recovery for further care/ monitoring.    Shelley Rae PA-C  [phone] Interventional Neuro- Radiology   Procedure Note      Procedure: Selective Cerebral Angiography   Pre- Procedure Diagnosis: ACOMM aneurysm s/p WEB   Post- Procedure Diagnosis: Complete persistent occlusion of aneurysm     : Dr. Tino MD  Resident: Dr. Vijay MD   Physician Assistant: Shelley Rae PA-C    RN: Teresa   Tech: Mi     Anesthesia: Dr. EDWIN Del Valle (MAC)      I/Os:  Fluids: 150cc DTV   Contrast: 18cc   Estimated Blood Loss: <10cc    Preliminary Report:  Under MAC, using a 5Fr short sheath to the right groin examination of right internal carotid artery via selective cerebral angiography demonstrates complete persistent occlusion of aneurysm. ( Official note to follow).    Patient tolerated procedure well, vital signs stable, hemodynamically stable, no change in neurological status compared to baseline. Results discussed with neurosurgery/ patient and their family. Groin sheath d/c'ed, manual compression held to hemostasis, no active bleeding, no hematoma, quick clot and safeguard balloon dressing applied at 9:15h. Patient transferred to IR recovery for further care/ monitoring.    Shelley Rae PA-C  [phone]

## 2020-06-11 NOTE — CHART NOTE - NSCHARTNOTEFT_GEN_A_CORE
Interventional Neuro Radiology  Pre-Procedure Note    This is a 55yo right hand dominant male who came to ER 10/21/19 with terrible headache, CT revealed subarachnoid hemorrhage, s/p endovascular embolization of ruptured ACOMM aneurysm with WEB on 10/22/19. Patient feels well, does not have any neuro-deficits or headaches, had to stop aspirin 2/2 GI bleed. Patient presents today to neuro IR for selective cerebral angiography follow up.     Neuro Exam: Awake and alert, oriented x3, fluent, normal naming and repetition, follows 3 step commands. Extraocular movements intact, no nystagmus, visual fields full, face symmetric, tongue midline. No drift, 5/5 power x 4 extremities. Normal sensation to LT. Normal finger-to-nose and rapid alternating movements.    PAST MEDICAL & SURGICAL HISTORY:  Vitiligo  S/P cerebral aneurysm repair: embolization with WEB 10/22/19 ([facility])    Social History:   Denies tobacco use    FAMILY HISTORY:  No pertinent family history    Allergies:   aspirin (Other)    Current Medications:     Labs:                         15.8   4.67  )-----------( 175      ( 09 Jun 2020 09:38 )             45.6       06-09    136  |  106  |  17  ----------------------------<  101<H>  4.2   |  19<L>  |  0.79    Ca    8.9      09 Jun 2020 09:38    Blood Bank: 06-09-20  B  --  Positive      Assessment/Plan:   This is a 55yo Male  presents with ruptured ACOMM aneurysm s/p WEB  embolization. Patient presents to neuro-IR for selective cerebral angiography. Procedure/ risks/ benefits/ goals/ alternatives were explained. Risks include but are not limited to stroke/ vessel injury/ hemorrhage/ groin hematoma. All questions answered. Informed content obtained from patient. Consent placed in chart.    Shelley Rae PA-C  [phone]

## 2020-06-12 DIAGNOSIS — I60.2 NONTRAUMATIC SUBARACHNOID HEMORRHAGE FROM ANTERIOR COMMUNICATING ARTERY: ICD-10-CM

## 2022-06-19 NOTE — DISCHARGE NOTE NURSING/CASE MANAGEMENT/SOCIAL WORK - PATIENT PORTAL LINK FT
"Primary Physician: Jeannette Justin DO    Chief Complaint   Patient presents with   • Vomiting Blood     Pt had an episode last week while at work of vomiting \"a lot of blood\"-ended up in [facility] ER 6/16/22; Pt states she hasn't had that happen since-states she was hoarse for several days from so much vomiting that day         Subjective     Desirae Alberto is a 58 y.o. female.    HPI   Hematemesis  She has complaints of intermittent nausea in the morning chronically, but on June 16 it was worse than usual leading to vomiting repeatedly which then led to bright red emesis.  She went to the emergency room for evaluation deemed stable for discharge.  Her hemoglobin was normal.  Of note she does have abnormal liver tests.  She was then added into my office schedule today.  She has no prior history of epistaxis, easy gum bleeding, hemoptysis, melena or hematochezia.  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  Noncontrast CT in ER neg from GI point of view.  She takes Voltaren imtermittently, but none since the hemetemesis.  She had an endoscopy 9/5/2019 by Dr. Tabares in [place] was suggestive of Hightower's esophagus but pathology was negative.  She has not had any interval endoscopy.    Abnormal LFTs  Noted upon chart review, this is been a problem since February 2022.  Noncontrast CT 6/2022 neg for hepatobiliary issues.       Past Medical History:   Diagnosis Date   • Arthritis    • Cancer (HCC)     lymphoma of small bowel   • Family history of colon cancer    • GERD (gastroesophageal reflux disease)    • Glaucoma    • Hyperlipidemia    • Hypothyroidism    • TIA (transient ischemic attack)        Past Surgical History:   Procedure Laterality Date   • BREAST EXCISIONAL BIOPSY Left 01/2016    benign   • BREAST SURGERY Left 2017   • CHOLECYSTECTOMY     • COLON RESECTION SMALL BOWEL  2014   • COLONOSCOPY  09/05/2019    Dr. Tabares-Internal and external hemorrhoids; Repeat " 10 years   • ENDOSCOPY  09/05/2019    Dr. Tabares-Possible Hightower's esophagus-biopsied   • KNEE ARTHROSCOPY Right 07/14/2020    Procedure: RIGHT KNEE PARTIAL MEDIAL MENISCECTOMY;  Surgeon: Vijay Grewal MD;  Location:  [facility] OR;  Service: Orthopedics;  Laterality: Right;   • LAPAROSCOPIC TUBAL LIGATION     • TOTAL KNEE ARTHROPLASTY Right 07/28/2021    Procedure: RIGHT TOTAL KNEE REPLACEMENT;  Surgeon: Eliecer Foley MD;  Location:  [facility] OR;  Service: Orthopedics;  Laterality: Right;   • TUBAL ABDOMINAL LIGATION     • VAGINAL MESH REVISION      2010/2016   • WISDOM TOOTH EXTRACTION          Current Outpatient Medications:   •  Ascorbic Acid (Vitamin C) 500 MG capsule, Take 1 capsule by mouth Daily., Disp: , Rfl:   •  cyclobenzaprine (FLEXERIL) 5 MG tablet, Take 1 tablet by mouth 3 (Three) Times a Day As Needed for Muscle Spasms., Disp: 30 tablet, Rfl: 2  •  diclofenac (VOLTAREN) 75 MG EC tablet, Take 1 tablet by mouth 2 (Two) Times a Day. (Patient taking differently: Take 75 mg by mouth As Needed.), Disp: 60 tablet, Rfl: 3  •  docusate sodium (COLACE) 100 MG capsule, Take 1 capsule by mouth 2 (Two) Times a Day As Needed for Constipation., Disp: 6 capsule, Rfl: 0  •  estradiol (Vivelle-Dot) 0.025 MG/24HR patch, Place 1 patch on the skin as directed by provider 2 (Two) Times a Week., Disp: 8 patch, Rfl: 11  •  gabapentin (NEURONTIN) 100 MG capsule, Take 1 capsule by mouth 3 (Three) Times a Day---Needs office appt for additional refills (Patient taking differently: Take 100 mg by mouth As Needed.), Disp: 90 capsule, Rfl: 0  •  icosapent ethyl (Vascepa) 1 g capsule capsule, Take 2 capsules by mouth 2 (Two) Times a Day With Meals. (Patient taking differently: Take 2 g by mouth As Needed.), Disp: 120 capsule, Rfl: 11  •  NON FORMULARY, Pure encapsulation vitamins, Disp: , Rfl:   •  ondansetron ODT (ZOFRAN-ODT) 4 MG disintegrating tablet, Place 1 tablet on the tongue Every 6 (Six) Hours As Needed for  Nausea., Disp: 12 tablet, Rfl: 0  •  Progesterone (Prometrium) 100 MG capsule, Take 1 capsule by mouth Daily., Disp: 30 capsule, Rfl: 11  •  saccharomyces boulardii (FLORASTOR) 250 MG capsule, Take 250 mg by mouth 2 (Two) Times a Day., Disp: , Rfl:   •  Semaglutide-Weight Management (Wegovy) 2.4 MG/0.75ML solution auto-injector, Inject 2.4 mg (1 pen) under the skin into the appropriate area as directed 1 (One) Time Per Week., Disp: 3 mL, Rfl: 11  •  venlafaxine XR (EFFEXOR-XR) 75 MG 24 hr capsule, Take 1 capsule by mouth Daily., Disp: 30 capsule, Rfl: 3  •  vitamin B-12 (CYANOCOBALAMIN) 500 MCG tablet, Take 1 tablet by mouth Daily., Disp: 30 tablet, Rfl: 11  •  vitamin B-6 (PYRIDOXINE) 25 MG tablet, Take 1 tablet by mouth Daily., Disp: 30 tablet, Rfl: 5  •  vitamin D3 125 MCG (5000 UT) capsule capsule, Take 5,000 Units by mouth Daily., Disp: , Rfl:   •  Zinc 10 MG lozenge, Dissolve  in the mouth., Disp: , Rfl:     No Known Allergies    Social History     Socioeconomic History   • Marital status:    Tobacco Use   • Smoking status: Former Smoker     Packs/day: 0.50     Years: 20.00     Pack years: 10.00     Types: Cigarettes     Quit date:      Years since quittin.4   • Smokeless tobacco: Never Used   • Tobacco comment: quit    Vaping Use   • Vaping Use: Never used   Substance and Sexual Activity   • Alcohol use: Not Currently   • Drug use: No   • Sexual activity: Defer       Family History   Problem Relation Age of Onset   • Heart defect Mother    • Diverticulitis Mother    • No Known Problems Father    • No Known Problems Sister    • Cancer Maternal Aunt    • Colon cancer Paternal Aunt 68   • Colon cancer Paternal Aunt 70   • Liver cancer Paternal Aunt    • Colon cancer Paternal Aunt 80   • Breast cancer Neg Hx    • Ovarian cancer Neg Hx    • Uterine cancer Neg Hx    • Melanoma Neg Hx    • Prostate cancer Neg Hx    • Colon polyps Neg Hx    • Esophageal cancer Neg Hx    • Liver disease Neg Hx    •  "Stomach cancer Neg Hx    • Rectal cancer Neg Hx        Review of Systems   Respiratory: Negative for shortness of breath.    Cardiovascular: Negative for chest pain.   Endocrine: Positive for heat intolerance.        H/o thyroid, but off of meds now and doing well.       Objective     /72 (BP Location: Left arm, Patient Position: Sitting, Cuff Size: Adult)   Pulse 95   Temp 97.7 °F (36.5 °C) (Infrared)   Ht 160 cm (63\")   Wt 79.4 kg (175 lb)   LMP 06/30/2019   SpO2 97%   Breastfeeding No   BMI 31.00 kg/m²     Physical Exam  Constitutional:       Appearance: She is well-developed.   Pulmonary:      Effort: Pulmonary effort is normal.   Musculoskeletal:         General: Normal range of motion.   Skin:     General: Skin is warm.   Neurological:      Mental Status: She is alert and oriented to person, place, and time.   Psychiatric:         Behavior: Behavior normal.         Lab Results - Last 18 Months   Lab Units 06/16/22  1331 06/07/22  0833 02/28/22  1528 08/11/21  0106 07/29/21  0459 07/28/21  1113 07/09/21  1435   GLUCOSE mg/dL 93 91 108* 112* 138* 125* 108*   BUN mg/dL 9 15 18 14 10 14 13   CREATININE mg/dL 0.70 0.77 0.71 0.83 0.65 0.64 0.88   SODIUM mmol/L 141 140 142 138 142 139 140   POTASSIUM mmol/L 4.1 4.3 4.2 4.1 4.0 4.7 4.1   CHLORIDE mmol/L 103 103 103 100 103 104 105   TOTAL CO2 mmol/L  --  25  --   --   --   --   --    CO2 mmol/L 29.0  --  26.0 26.0 30.0* 27.0 28.0   TOTAL PROTEIN g/dL 7.6  --  7.6 7.1  --   --  6.8   ALBUMIN g/dL 4.80 4.6 5.00 4.40  --   --  4.60   ALT (SGPT) U/L 61* 36* 54* 30  --   --  37*   AST (SGOT) U/L 36* 34 37* 22  --   --  28   ALK PHOS U/L 95 86 116 203*  --   --  93   BILIRUBIN mg/dL 0.5 0.3 0.3 0.3  --   --  0.2   GLOBULIN gm/dL 2.8  --  2.6 2.7  --   --  2.2       Lab Results - Last 18 Months   Lab Units 06/16/22  1331 06/07/22  0833 02/28/22  1528 08/11/21  0106 07/29/21  0459 07/28/21  1113 07/09/21  1435   HEMOGLOBIN g/dL 14.3 13.5 13.5 10.0* 10.5* 12.3 " 12.8   HEMATOCRIT % 44.4 40.9 40.7 29.8* 32.6* 36.9 37.6   MCV fL 90.2 88 86.4 88.7  --   --  86.6   WBC 10*3/mm3 5.70 3.8 5.16 6.12  --   --  3.80   RDW % 13.0 13.5 13.2 12.9  --   --  12.2*   MPV fL 9.7  --  9.9 9.2  --   --  9.7   PLATELETS 10*3/mm3 237 233 271 406  --   --  211   INR   --   --   --  1.14*  --   --  0.97       Lab Results - Last 18 Months   Lab Units 06/07/22  0833 02/28/22  1528 08/11/21  0106   TSH uIU/mL 2.980 5.210* 6.080*   VIT D 25 HYDROXY ng/mL 45.8 38.0  --           CT ABDOMEN PELVIS WO CONTRAST- 6/16/2022 1:45 PM CDT     HISTORY: Excessive burping, nausea vomiting, hematemesis, history of  small bowel obstruction, constipation      COMPARISON: CT scan dated 9/17/2019      DOSE LENGTH PRODUCT: 491 mGy cm. Automated exposure control was also  utilized to decrease patient radiation dose.     TECHNIQUE: Noncontrast enhanced images of the abdomen and pelvis  obtained without oral contrast. Multiplanar reformatted images were  provided for review.      FINDINGS:   LOWER CHEST: The lung bases and base of the heart are unremarkable.      LIVER: No focal liver lesion within limits of a noncontrast study.      BILIARY SYSTEM: The gallbladder has been removed. There is no evidence  of biliary ductal dilation.      PANCREAS: No focal pancreatic lesion within limits of a noncontrast  study.      SPLEEN: Unremarkable.      KIDNEYS: Bilateral kidneys are unremarkable. The ureters are  decompressed and normal in appearance.     ADRENALS: Unremarkable.     RETROPERITONEUM: No mass, lymphadenopathy or hemorrhage.      GI TRACT: Stomach is decompressed. Small bowel loops are nondilated.  Previous partial small bowel resection with anastomosis left of midline  in the mid abdomen. Large volume colonic stool. No inflammatory changes  identified along the colon.     OTHER: There is no mesenteric mass, lymphadenopathy or fluid collection.  No acute bony abnormality. L4-5 and L5-S1 level degenerative  changes  with loss of disc height, endplate sclerosis and facet arthropathy.      PELVIS: Prior hysterectomy. There is an approximately 1.8 cm rounded,  fairly well-circumscribed and intrinsically dense lesion identified in  the midline at the base the urinary bladder (series 2-image 25, series  1-image 81, series 3-image 38).     IMPRESSION:  1. There is a 1.8 cm rounded, intrinsically dense lesion which is seen  in the midline at the base the urinary bladder, perhaps immediately  anterior to the orifice of the urethra based on the position on the  sagittal images. Evaluation is slightly limited by the fact that the  urinary bladder is decompressed. This would be somewhat unusual for a  neoplastic lesion given its rounded appearance and intrinsic density, as  neoplasia is not typically hyperdense without contrast. My differential  would include a cystic lesion with internal high density debris, such as  can be seen in cystitis cystica. Differential would also include  neoplasia or perhaps an inflammatory pseudotumor. I would recommend  urology referral.  2. No acute process identified.   3. Large volume colonic stool, perhaps constipation.  4. I do not see any acute inflammatory process along the bowel. Previous  partial small bowel resection with unremarkable surgical anastomosis.  Small bowel loops are decompressed.  This report was finalized on 06/16/2022 14:30 by Dr Home Funes, .    IMPRESSION/PLAN:    Assessment & Plan      Problem List Items Addressed This Visit        Gastrointestinal Abdominal     Elevated liver enzymes    Overview     New problem since 2/8/2022.  She is status post cholecystectomy.  Unenhanced CT 6/2022 negative for liver lesions.  Will recheck labs in about a month           Relevant Orders    Comprehensive Metabolic Panel    Hematemesis - Primary    Overview     New problem as of 6/16/2022.  She had been on volteran.  No further hemetesis.  Will plan endoscopy thanh with her prior h/o  lymphoma           Current Assessment & Plan     The risks, benefits, and alternatives of endoscopy were reviewed with the patient today.  Risks including perforation, with or without dilation, possibly requiring surgery.  Additional risks include risk of bleeding.  There is also the risk of a drug reaction or problems with anesthesia.  This will be discussed with the further by the anesthesia team on the day of the procedure. The benefits include the diagnosis and management of disease of the upper digestive tract.  Alternatives to endoscopy include upper GI series, laboratory testing, radiographic evaluation, or no intervention.  The patient verbalizes understanding and agrees.    In accordance with requirements under the Affordable Care Act, [facility] has provided pricing for all hospital services and items on each of its websites. However, a patient's actual cost may differ based on the services the patient receives to meet individual healthcare needs and based on the benefits provided under the patient’s insurance coverage.             Relevant Orders    Case Request (Completed)                          Clotilde Friedman MD  06/20/22  15:31 CDT    Much of this encounter note is an electronic transcription/translation of spoken language to printed text. The electronic translation of spoken language may permit erroneous, or at times, nonsensical words or phrases to be inadvertently transcribed; although I have reviewed the note for such errors, some may still exist.     You can access the FollowMyHealth Patient Portal offered by [facility] by registering at the following website: [email or web address]. By joining [facility]’s FollowMyHealth portal, you will also be able to view your health information using other applications (apps) compatible with our system.

## 2023-01-30 NOTE — PROGRESS NOTE ADULT - ASSESSMENT
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 gait disturbance due to decreased strength, balance, and pain

## 2023-12-30 NOTE — PROGRESS NOTE ADULT - SUBJECTIVE AND OBJECTIVE BOX
The Third Trimester  (28-42 weeks)  YOUR BABY   * your baby sucks its thumb now!   * your baby can hear voices and respond to touch…..so talk to him or her!!   * your baby’s brain grows and develops most in the last 2 months of pregnancy   * baby’s head and bones are soft and flexible so they can fit through the birth canal   * baby’s movements change towards the end of pregnancy because there is less room for kicking and stretching in your belly   * baby’s lungs are not fully developed and completely ready to breathe on their own until the last 3-4 weeks before your due date    YOUR BODY   * your belly is growing a lot now   * it may become more difficult to sleep well at night or to be as active as you usually are   * you may sweat more than usual   * you will become more off-balance……be careful not to fall!!   * you may develop hemorrhoids (which can be painful and make it difficult to sit down)   * the last two months of pregnancy can become very uncomfortable……with backaches, headaches, and heartburn   * you can start to have contractions…….as long as they are irregular and less than 5 per hour, this is a normal part of your body getting ready to have a baby   * your cervix may start to thin out and open up……to get ready for delivery   * you may find yourself needing to “pee” very often…….because baby is pressing on your bladder so much   * you may get out of breathe more quickly than usual      FETAL KICK COUNTS    In the third trimester (after 28 weeks gestation) you should be performing fetal kick counts every day.  Your baby should move at least 10 times in 2 hours during an active time, once a day.    Choose atime of day when your baby is most active.  Try to do this around the same time each day.  Get into a comfortable position and then write down the time your baby first moves.  Count each movement until the baby moves 10 times.  These movements include kicks, punches, nudges, flutters, or rolls.  This  SUBJECTIVE: HPI:  54M with no known PMH but has not seen a physician in decades presents with HA and dizziness. Last Mon afternoon started with migrating HA and neck pain. Friday was doing some chores outside and around 10pm noted return of headache, mostly on the top of the head. The pain kept increasing overnight. Couldn't sleep. Pain was the worst he could recall in recent memory. Endorses nausea but no vomiting. Dizziness is positional and worse when he bends over or stands up quickly. Denies neck pain, chest pain, light sensitivity, chest pain, sob, abd pain, diarrhea, rash. Pt endorsed some chills at home but no fever. Denies recent trauma. Did fall and hit his head two years ago, and didn't seek treatment. (21 Oct 2019 17:54)      OVERNIGHT EVENTS: No acute events overnight, patient seen and evaluated at bedside this morning. Complaining of headache but controlled with pain medications. Spoke to patient at bedside and let him know that he may potentially go for angio today w/ Dr. Jiménez, in the meantime he is NPO except meds.     Vital Signs Last 24 Hrs  T(C): 37.3 (28 Oct 2019 07:49), Max: 37.6 (27 Oct 2019 23:23)  T(F): 99.1 (28 Oct 2019 07:49), Max: 99.7 (27 Oct 2019 23:23)  HR: 63 (28 Oct 2019 07:49) (56 - 83)  BP: 152/88 (28 Oct 2019 07:49) (118/74 - 152/88)  BP(mean): --  RR: 18 (28 Oct 2019 07:49) (18 - 18)  SpO2: 97% (28 Oct 2019 07:49) (95% - 98%)    PHYSICAL EXAM:    General: No Acute Distress     Neurological: AOX3 (name, place, time), pupils equal & reactive (2mm b/l), following commands, DE LA VEGA 5/5, symmetric    Pulmonary: Clear to Auscultation, No Rales, No Rhonchi, No Wheezes     Cardiovascular: S1, S2, Regular Rate and Rhythm     Gastrointestinal: Soft, Nontender, Nondistended     Incision: None    LABS:   10-28    132<L>  |  100  |  15  ----------------------------<  128<H>  4.2   |  23  |  0.73    Ca    8.2<L>      28 Oct 2019 01:45      Hemoglobin A1C, Whole Blood: 5.7 % (10-21 @ 22:34)      10-27 @ 07:01  -  10-28 @ 07:00  --------------------------------------------------------  IN: 240 mL / OUT: 1000 mL / NET: -760 mL      DRAINS: None    MEDICATIONS:  Antibiotics:    Neuro:  oxyCODONE    IR 5 milliGRAM(s) Oral every 4 hours PRN Moderate Pain (4 - 6)  oxyCODONE    IR 10 milliGRAM(s) Oral every 4 hours PRN Severe Pain (7 - 10)    Cardiac:  niMODipine 30 milliGRAM(s) Oral every 2 hours  tamsulosin 0.4 milliGRAM(s) Oral at bedtime    Pulm:    GI/:  polyethylene glycol 3350 17 Gram(s) Oral daily  senna 1 Tablet(s) Oral daily    Other:   aspirin enteric coated 325 milliGRAM(s) Oral <User Schedule>  enoxaparin Injectable 40 milliGRAM(s) SubCutaneous <User Schedule>  influenza   Vaccine 0.5 milliLiter(s) IntraMuscular once  sodium chloride 1 Gram(s) Oral every 8 hours  sodium chloride 2% . 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    Redemonstration intraventricular hemorrhage and subarachnoid hemorrhage   with susceptibility and DWI hyperintensity, and questionable faint   restricted diffusion along cortical margins in the right parietal region.    Ventricles and sulci remain unchanged from prior, there is no new large   extra-axial collection or midline shift.    CARY DE LEON M.D., ATTENDING RADIOLOGIST  This document has been electronically signed. Oct 23 2019  2:29PM        < end of copied text > can take anywhere from 5 minutes to 2 hours.  Write down the time you feel the baby's 10th movement.    If 2 hours has passed and your baby has not moved at least 10 times, you should CALL THE OFFICE RIGHT AWAY.  [phone]          PREMATURE LABOR     When to call [phone]:  * I need to call immediately if I have even a small amount of LIQUID leaking from my vagina, with or without contractions.   * I need to call if I am BLEEDING from my vagina.   * I need to call if I am feeling CRAMPING that continues after drinking 2-3 glasses of water and lying down on my side for one hour and that feels like I am having a period.   * I need to call if I feel CONTRACTIONS  more than 4 times in an hour that feels like the baby is “balling up” even after I try drinking 2-3 glasses of water and lying down on my side for an hour.   * I need to call if I notice a change in my vaginal DISCHARGE.   * I need to call if I am feeling PELVIC PRESSURE  that feels like the baby is pushing down into my vagina and lasts more than an hour.   * I need to call if I have LOW BACKACHE which is new and near my tailbone.  It may either come and go several times during an hour or stay there constantly.          PRE-ECLAMPSIA     What is it?   Pre-eclampsia is a serious disease that can occur during pregnancy related to high blood pressures.  It can happen to any woman.     Why should I care?   Women who develop pre-eclampsia have serious risks which can include seizures, stroke, organ damage, premature birth of their baby.  In the very worst cases, it can cause death of the mother and/or their baby.     What should I pay attention to?   Signs and symptoms of pre-eclampsia can include:   * Severe swelling of face or hands    * A headache that will not go away even after you have taken Tylenol   * Seeing spots or changes in eyesight    * Pain in the upper abdomen or shoulder    * New nausea and vomiting (in the second half of pregnancy)    *  Sudden weight gain    * Difficulty breathing     What should I do?   If you experience any of the above symptoms of pre-eclampsia, contact your OB provider.  Finding pre-eclampsia early is important for you and your baby.  Call us at [phone]      BREASTFEEDING     BENEFITS FOR BABIES   * stronger immune systems (less allergies, eczema, asthma, and childhood cancers)   * less diarrhea and constipation or other GI diseases   * fewer colds and ear infections   * better vision and teeth (fewer cavities)   * improves IQ   * lower rates of diabetes and obesity in childhood     BENEFITS FOR MOMS   * promotes faster weight loss after delivery   * lower risk for postpartum depression   * lower risk for breast, uterine, and ovarian cancers   * lower risk for osteoporosis developing with age   * easier than formula - is always right with you, clean, and the right temperature   * less expensive than formula……it’s FREE !!!!     KEYS TO SUCCESSFUL BREASTFEEDING   * keep baby skin-to-skin until after first feeding event   * keep baby in your room with you during your hospital stay after delivery   * avoid any bottle feedings (unless medically necessary)   * limit the use of pacifiers and swaddling   * ask for help if you are having any issues……lactation consultants (who specialize in breastfeeding) are available to help you   * a healthy diet for mom……eating a variety of foods and portions in moderation    THINGS YOU SHOULD KNOW ABOUT BREASTFEEDING   * most medications are considered compatible with breastfeeding by the American Academy of Pediatrics, but you should check with your health care provider or lactation consultant prior to taking a new medication……just to be sure it is safe   * alcohol (beer, wine, liquor) can be passed from mother to baby through breast milk……an occasional, social drink is deemed acceptable by the American Academy of Pediatrics…..more than that should be avoided   * breastfeeding is NOT an  effective method of birth control   * nicotine (in cigarettes) can pass from mother to baby through breast milk…..however, for mothers who smoke, it is still healthier to breastfeed than use formula   * caffeine should be limited to 1-3 cups per day……includes coffee, soda, energy drinks         PERINEAL / VAGINAL MASSAGE    What can I do now to decrease my chances of tearing during delivery?  Massaging around the vaginal opening by you (or your partner), either antepartum (before birth) or during the second stage of labor, can reduce the likelihood of perineal tearing during childbirth.  Likewise, the use of warm packs held on the perineum during the pushing stage of labor can reduce the severity of your tear.  This will happen during the pushing stage of labor.  At home, you can also help reduce the chances of injury that may occur during the birth of your child through perineal massage.    When should I do this?  Starting around or shortly after 34 weeks of pregnancy, you or your partner should provide 5-10 minutes of vaginal massage 1-4 times per week.    How?  Use either almond, coconut, or olive oil and water mixture on 1 or 2 fingers (depending on comfort).  Insert finger(s) 3-5cm into the vagina.  Apply sweeping downward/sideward pressure from 3 to 9 o'clock for 5-10 minutes, 1-4 times per week.          WARNING SIGNS DURING PREGNANCY  Call our office at [phone] if you experience any of the followin. Vaginal bleeding  2. Sharp abdominal pain that does not go away  3. Fever (more than 100.4 and is not relieved by Tylenol)  4. Persistent vomiting lasting greater than 24 hours  5. Chest pain   6. Pain or burning when you urinate  7. Severe headache that doesn't resolve with Tylenol  8. Blurred vision or seeing spots in your vision  9. Sudden swelling of your face or hands  10. Redness, swelling or pain in a leg  11. A sudden weight gain in just a few days  12. Decrease in your baby's movement (after  28 weeks or the 6th month of pregnancy)  13. A loss of watery fluid from your vagina - can be a gush, a trickle or continuous wetness  14. After 20 weeks of pregnancy, rhythmic cramping (greater than 4 per hour) or menstrual like low/pelvic pain          VACCINES IN PREGNANCY    TDAP  Whooping cough (or pertussis) can be serious for anyone, but for your , it can be life-threatning.  Up to 20 babies die each year in the [place] Due to whooping cough.  About half of babies younger than 1 year old who get whooping cough need treatment in the hospital.  The younger the baby is when he or she gets whooping cough, the more likely he or she will need to be treated in a hospital.  When you receive the whooping cough vaccine (Tdap) during your pregnancy, your body will create protective antibodies and pass some of them to your baby before birth.  These antibodies can help protect your baby from getting whooping cough until they are old enough to be vaccinated themselves (usually around 6 months of age).    INFLUENZA  Changes in your immune, heart, and lung functions during pregnancy make you more likely to get seriously ill from the flu.  Catching the flu also increases your chances for serious problems for your developing baby, including premature labor and delivery.  It is recommended that all women who are pregnant during flu season should receive an influenza vaccine.

## 2024-05-23 ENCOUNTER — EMERGENCY (EMERGENCY)
Facility: HOSPITAL | Age: 59
LOS: 1 days | Discharge: HOME | End: 2024-05-23
Attending: EMERGENCY MEDICINE
Payer: COMMERCIAL

## 2024-05-23 VITALS
SYSTOLIC BLOOD PRESSURE: 142 MMHG | DIASTOLIC BLOOD PRESSURE: 96 MMHG | TEMPERATURE: 98 F | HEIGHT: 69 IN | HEART RATE: 77 BPM | RESPIRATION RATE: 18 BRPM | WEIGHT: 175.93 LBS | OXYGEN SATURATION: 98 %

## 2024-05-23 DIAGNOSIS — Z98.890 OTHER SPECIFIED POSTPROCEDURAL STATES: Chronic | ICD-10-CM

## 2024-05-23 PROCEDURE — 99283 EMERGENCY DEPT VISIT LOW MDM: CPT

## 2024-05-23 PROCEDURE — 99282 EMERGENCY DEPT VISIT SF MDM: CPT

## 2024-05-23 NOTE — ED ADULT NURSE NOTE - NSICDXPASTSURGICALHX_GEN_ALL_CORE_FT
PAST SURGICAL HISTORY:  S/P cerebral aneurysm repair embolization with WEB 10/22/19 ([facility])

## 2024-05-23 NOTE — ED PROVIDER NOTE - NSFOLLOWUPINSTRUCTIONS_ED_ALL_ED_FT
Follow-up with your primary care provider within 1 to 3 days.    Take Tylenol over-the-counter as needed for pain.    Return to ED with any new, worsening or concerning symptoms.

## 2024-05-23 NOTE — ED PROVIDER NOTE - PATIENT PORTAL LINK FT
You can access the FollowMyHealth Patient Portal offered by [facility] by registering at the following website: [email or web address]. By joining [employer]’s FollowMyHealth portal, you will also be able to view your health information using other applications (apps) compatible with our system.

## 2024-05-23 NOTE — ED PROVIDER NOTE - CARE PLAN
Principal Discharge DX:	Leg muscle spasm   1 Principal Discharge DX:	Leg muscle spasm  Assessment and plan of treatment:	vs neuropraxia

## 2024-05-23 NOTE — ED PROVIDER NOTE - NSICDXPASTSURGICALHX_GEN_ALL_CORE_FT
Yes PAST SURGICAL HISTORY:  S/P cerebral aneurysm repair embolization with WEB 10/22/19 ([facility])

## 2024-05-23 NOTE — ED PROVIDER NOTE - CLINICAL SUMMARY MEDICAL DECISION MAKING FREE TEXT BOX
Shawn Ramos PA-C: 58-year-old male past medical history of a repaired ACOM aneurysm presenting to ED complaining of now resolved left lower extremity tingling/spasm after a fall from seated height from a rolling chair.  Vitals and physical exam reassuring.  Neuro intact.  No joint pain.  Freely ambulating in ED.  Reassurance given to both patient and wife at bedside.

## 2024-05-23 NOTE — ED PROVIDER NOTE - OBJECTIVE STATEMENT
58-year-old male past medical history ruptured ACOMM aneurysm s/p WEB  embolization 2020 [facility] Dr. Jiménez, presents to ED complaining of distal left lower extremity spasm/tingling that occurred for a couple of minutes after he had accidentally fallen out of a rolling–chair.  Symptoms are now totally resolved.  Freely ambulatory in ED without pain or discomfort.  No headache, dizziness, vision changes, nausea, vomiting, chest pain, shortness of breath, abdominal pain.

## 2024-05-23 NOTE — ED PROVIDER NOTE - PHYSICAL EXAMINATION
GEN: Pt well appearing in NAD, alert.  PSYCH: Affect and mood appropriate.  EYES: Sclera white w/o injection, EOMI, PERRLA.   ENT: Neck supple. Airway patent.  RESP: CTA b/l.  CARDIAC: RRR, S1, S2, no M/G/R.  ABD: Soft, NT.  MSK: No joint deformity, swelling or TTP of LLE. DE LA VEGA spontaneously.  NEURO: Nonfocal. Strength 5/5 throughout. Steady gait.  VASC: Strong DP pulses. No edema. No calf pain.  SKIN: No rashes or lesions.

## 2024-05-23 NOTE — ED ADULT TRIAGE NOTE - CHIEF COMPLAINT QUOTE
fell out of a chair today, no head striking or LOC or AC use  endorsing "pins and needles" in L leg after fall

## 2024-05-23 NOTE — ED ADULT TRIAGE NOTE - BP NONINVASIVE DIASTOLIC (MM HG)
Med rec updated and complete.  Allergies reviewed.  Pt denies antibiotic use in last 30 days.  Not all current medications are at bedside.  Discussed with pt and updated information.  Current medications at bedside include 1 control  Substance.  Pt stated someone will be able to  her prescriptions/medications tomorrow.   96

## 2024-05-23 NOTE — ED PROVIDER NOTE - ATTENDING APP SHARED VISIT CONTRIBUTION OF CARE
I saw/examined w pa  clinically this is probably neuropraxia, resolved  return precautions and f/u advise given bedside.

## 2024-06-03 DIAGNOSIS — Z00.00 ENCOUNTER FOR GENERAL ADULT MEDICAL EXAMINATION W/OUT ABNORMAL FINDINGS: ICD-10-CM

## 2024-07-13 ENCOUNTER — APPOINTMENT (OUTPATIENT)
Dept: INTERNAL MEDICINE | Facility: CLINIC | Age: 59
End: 2024-07-13
Payer: COMMERCIAL

## 2024-07-13 VITALS
SYSTOLIC BLOOD PRESSURE: 149 MMHG | OXYGEN SATURATION: 97 % | BODY MASS INDEX: 25.91 KG/M2 | DIASTOLIC BLOOD PRESSURE: 92 MMHG | WEIGHT: 171 LBS | HEIGHT: 68 IN | TEMPERATURE: 98.1 F | HEART RATE: 93 BPM

## 2024-07-13 DIAGNOSIS — I67.1 CEREBRAL ANEURYSM, NONRUPTURED: ICD-10-CM

## 2024-07-13 DIAGNOSIS — K27.9 PEPTIC ULCER, SITE UNSPECIFIED, UNSPECIFIED AS ACUTE OR CHRONIC, W/OUT HEMORRHAGE OR PERFORATION: ICD-10-CM

## 2024-07-13 DIAGNOSIS — I60.9 NONTRAUMATIC SUBARACHNOID HEMORRHAGE, UNSPECIFIED: ICD-10-CM

## 2024-07-13 PROCEDURE — 93000 ELECTROCARDIOGRAM COMPLETE: CPT | Mod: 59

## 2024-07-13 PROCEDURE — 99386 PREV VISIT NEW AGE 40-64: CPT

## 2024-07-13 PROCEDURE — G0444 DEPRESSION SCREEN ANNUAL: CPT | Mod: 59

## 2024-07-13 PROCEDURE — 99203 OFFICE O/P NEW LOW 30 MIN: CPT

## 2024-07-13 RX ORDER — ERGOCALCIFEROL 1.25 MG/1
1.25 MG CAPSULE, LIQUID FILLED ORAL
Qty: 12 | Refills: 0 | Status: ACTIVE | COMMUNITY
Start: 2024-07-13 | End: 1900-01-01

## 2024-07-15 ENCOUNTER — NON-APPOINTMENT (OUTPATIENT)
Age: 59
End: 2024-07-15

## 2024-07-31 NOTE — PROVIDER CONTACT NOTE (MEDICATION) - ACTION/TREATMENT ORDERED:
A High Calorie/High Protein diet is recommended to meet your nutritional needs    If you have no chewing or swallowing difficulties:     Recommend taking two (2) protein shakes daily for one month following discharge from hospital for muscle preservation and healing promotion. Protein shake should contain at least 18 grams protein per shake or more.   Consider an unflavored protein powder (Vital Protein, UNJury, Isopure or similar store-brand powders) dissolved into hot/warm fluids like tea/coffee, broth, soups, etc.  Consider a high protein/high calorie shake (Ensure Complete, Ensure Plus, Boost High Protein, Boost Very High Calorie or store brand equivalents)    Aim to eat 4-6 smaller, protein-containing meals/snacks daily if you have a smaller appetite to encourage calories and protein throughout the day. Protein sources include eggs, chicken/turkey, fish, lean meats, dairy (if tolerated) - Greek yogurt has more protein than regular yogurt, nuts/nut butters, etc.    An outpatient dietitian can help personalize your nutrition plan. Physician referral required.     ANGY Yanez stated no action necessary at this time. Will continue to monitor

## 2024-09-30 ENCOUNTER — NON-APPOINTMENT (OUTPATIENT)
Age: 59
End: 2024-09-30

## 2024-10-10 ENCOUNTER — APPOINTMENT (OUTPATIENT)
Dept: GASTROENTEROLOGY | Facility: CLINIC | Age: 59
End: 2024-10-10
Payer: COMMERCIAL

## 2024-10-10 ENCOUNTER — LABORATORY RESULT (OUTPATIENT)
Age: 59
End: 2024-10-10

## 2024-10-10 PROCEDURE — 45380 COLONOSCOPY AND BIOPSY: CPT

## 2024-12-06 NOTE — PROGRESS NOTE ADULT - PROBLEM SELECTOR PROBLEM 3
Essential hypertension
Essential hypertension
Hyponatremia
normal sinus rhythm

## 2024-12-30 ENCOUNTER — APPOINTMENT (OUTPATIENT)
Dept: INTERNAL MEDICINE | Facility: CLINIC | Age: 59
End: 2024-12-30

## 2025-01-06 ENCOUNTER — APPOINTMENT (OUTPATIENT)
Dept: INTERNAL MEDICINE | Facility: CLINIC | Age: 60
End: 2025-01-06

## 2025-02-05 NOTE — H&P PST ADULT - NS SC CAGE ALCOHOL CUT DOWN
Orders:    tirzepatide (Zepbound) 7.5 mg/0.5 mL auto-injector; Inject 0.5 mL (7.5 mg total) under the skin once a week     no

## 2025-02-17 NOTE — ED ADULT TRIAGE NOTE - HEART RATE (BEATS/MIN)
Pt wants to dc jardiance due to cost  Dr locke  
Benefits, risks, and possible complications of procedure explained to patient/caregiver who verbalized understanding and gave written consent.
68

## 2025-05-04 ENCOUNTER — EMERGENCY (EMERGENCY)
Facility: HOSPITAL | Age: 60
LOS: 1 days | End: 2025-05-04
Attending: STUDENT IN AN ORGANIZED HEALTH CARE EDUCATION/TRAINING PROGRAM
Payer: COMMERCIAL

## 2025-05-04 VITALS
WEIGHT: 169.98 LBS | RESPIRATION RATE: 20 BRPM | TEMPERATURE: 98 F | OXYGEN SATURATION: 96 % | DIASTOLIC BLOOD PRESSURE: 91 MMHG | SYSTOLIC BLOOD PRESSURE: 148 MMHG | HEIGHT: 68 IN | HEART RATE: 86 BPM

## 2025-05-04 VITALS
SYSTOLIC BLOOD PRESSURE: 141 MMHG | DIASTOLIC BLOOD PRESSURE: 96 MMHG | HEART RATE: 90 BPM | OXYGEN SATURATION: 98 % | RESPIRATION RATE: 20 BRPM

## 2025-05-04 DIAGNOSIS — Z98.890 OTHER SPECIFIED POSTPROCEDURAL STATES: Chronic | ICD-10-CM

## 2025-05-04 LAB
ALBUMIN SERPL ELPH-MCNC: 4.2 G/DL — SIGNIFICANT CHANGE UP (ref 3.3–5)
ALP SERPL-CCNC: 59 U/L — SIGNIFICANT CHANGE UP (ref 40–120)
ALT FLD-CCNC: 25 U/L — SIGNIFICANT CHANGE UP (ref 10–45)
ANION GAP SERPL CALC-SCNC: 13 MMOL/L — SIGNIFICANT CHANGE UP (ref 5–17)
APPEARANCE UR: CLEAR — SIGNIFICANT CHANGE UP
APTT BLD: 28.8 SEC — SIGNIFICANT CHANGE UP (ref 26.1–36.8)
AST SERPL-CCNC: 20 U/L — SIGNIFICANT CHANGE UP (ref 10–40)
BASOPHILS # BLD AUTO: 0.01 K/UL — SIGNIFICANT CHANGE UP (ref 0–0.2)
BASOPHILS NFR BLD AUTO: 0.1 % — SIGNIFICANT CHANGE UP (ref 0–2)
BILIRUB SERPL-MCNC: 0.4 MG/DL — SIGNIFICANT CHANGE UP (ref 0.2–1.2)
BILIRUB UR-MCNC: NEGATIVE — SIGNIFICANT CHANGE UP
BUN SERPL-MCNC: 13 MG/DL — SIGNIFICANT CHANGE UP (ref 7–23)
CALCIUM SERPL-MCNC: 8.1 MG/DL — LOW (ref 8.4–10.5)
CHLORIDE SERPL-SCNC: 102 MMOL/L — SIGNIFICANT CHANGE UP (ref 96–108)
CO2 SERPL-SCNC: 17 MMOL/L — LOW (ref 22–31)
COLOR SPEC: YELLOW — SIGNIFICANT CHANGE UP
CREAT SERPL-MCNC: 0.83 MG/DL — SIGNIFICANT CHANGE UP (ref 0.5–1.3)
DIFF PNL FLD: NEGATIVE — SIGNIFICANT CHANGE UP
EGFR: 102 ML/MIN/1.73M2 — SIGNIFICANT CHANGE UP
EGFR: 102 ML/MIN/1.73M2 — SIGNIFICANT CHANGE UP
EOSINOPHIL # BLD AUTO: 0.15 K/UL — SIGNIFICANT CHANGE UP (ref 0–0.5)
EOSINOPHIL NFR BLD AUTO: 1.9 % — SIGNIFICANT CHANGE UP (ref 0–6)
GLUCOSE SERPL-MCNC: 113 MG/DL — HIGH (ref 70–99)
GLUCOSE UR QL: NEGATIVE MG/DL — SIGNIFICANT CHANGE UP
HCT VFR BLD CALC: 41.4 % — SIGNIFICANT CHANGE UP (ref 39–50)
HGB BLD-MCNC: 14.2 G/DL — SIGNIFICANT CHANGE UP (ref 13–17)
IMM GRANULOCYTES NFR BLD AUTO: 0.3 % — SIGNIFICANT CHANGE UP (ref 0–0.9)
INR BLD: 1.14 RATIO — SIGNIFICANT CHANGE UP (ref 0.85–1.16)
KETONES UR-MCNC: NEGATIVE MG/DL — SIGNIFICANT CHANGE UP
LEUKOCYTE ESTERASE UR-ACNC: NEGATIVE — SIGNIFICANT CHANGE UP
LYMPHOCYTES # BLD AUTO: 1.57 K/UL — SIGNIFICANT CHANGE UP (ref 1–3.3)
LYMPHOCYTES # BLD AUTO: 19.6 % — SIGNIFICANT CHANGE UP (ref 13–44)
MAGNESIUM SERPL-MCNC: 2 MG/DL — SIGNIFICANT CHANGE UP (ref 1.6–2.6)
MCHC RBC-ENTMCNC: 30.4 PG — SIGNIFICANT CHANGE UP (ref 27–34)
MCHC RBC-ENTMCNC: 34.3 G/DL — SIGNIFICANT CHANGE UP (ref 32–36)
MCV RBC AUTO: 88.7 FL — SIGNIFICANT CHANGE UP (ref 80–100)
MONOCYTES # BLD AUTO: 0.4 K/UL — SIGNIFICANT CHANGE UP (ref 0–0.9)
MONOCYTES NFR BLD AUTO: 5 % — SIGNIFICANT CHANGE UP (ref 2–14)
NEUTROPHILS # BLD AUTO: 5.84 K/UL — SIGNIFICANT CHANGE UP (ref 1.8–7.4)
NEUTROPHILS NFR BLD AUTO: 73.1 % — SIGNIFICANT CHANGE UP (ref 43–77)
NITRITE UR-MCNC: NEGATIVE — SIGNIFICANT CHANGE UP
NRBC BLD AUTO-RTO: 0 /100 WBCS — SIGNIFICANT CHANGE UP (ref 0–0)
PH UR: 5.5 — SIGNIFICANT CHANGE UP (ref 5–8)
PHOSPHATE SERPL-MCNC: 3.4 MG/DL — SIGNIFICANT CHANGE UP (ref 2.5–4.5)
PLATELET # BLD AUTO: 176 K/UL — SIGNIFICANT CHANGE UP (ref 150–400)
POTASSIUM SERPL-MCNC: 4 MMOL/L — SIGNIFICANT CHANGE UP (ref 3.5–5.3)
POTASSIUM SERPL-SCNC: 4 MMOL/L — SIGNIFICANT CHANGE UP (ref 3.5–5.3)
PROT SERPL-MCNC: 6.9 G/DL — SIGNIFICANT CHANGE UP (ref 6–8.3)
PROT UR-MCNC: NEGATIVE MG/DL — SIGNIFICANT CHANGE UP
PROTHROM AB SERPL-ACNC: 13.1 SEC — SIGNIFICANT CHANGE UP (ref 9.9–13.4)
RBC # BLD: 4.67 M/UL — SIGNIFICANT CHANGE UP (ref 4.2–5.8)
RBC # FLD: 12.9 % — SIGNIFICANT CHANGE UP (ref 10.3–14.5)
SODIUM SERPL-SCNC: 132 MMOL/L — LOW (ref 135–145)
SP GR SPEC: 1.01 — SIGNIFICANT CHANGE UP (ref 1–1.03)
UROBILINOGEN FLD QL: 0.2 MG/DL — SIGNIFICANT CHANGE UP (ref 0.2–1)
WBC # BLD: 7.99 K/UL — SIGNIFICANT CHANGE UP (ref 3.8–10.5)
WBC # FLD AUTO: 7.99 K/UL — SIGNIFICANT CHANGE UP (ref 3.8–10.5)

## 2025-05-04 PROCEDURE — 85610 PROTHROMBIN TIME: CPT

## 2025-05-04 PROCEDURE — 85025 COMPLETE CBC W/AUTO DIFF WBC: CPT

## 2025-05-04 PROCEDURE — 99283 EMERGENCY DEPT VISIT LOW MDM: CPT | Mod: 25

## 2025-05-04 PROCEDURE — 99284 EMERGENCY DEPT VISIT MOD MDM: CPT

## 2025-05-04 PROCEDURE — 84100 ASSAY OF PHOSPHORUS: CPT

## 2025-05-04 PROCEDURE — 85730 THROMBOPLASTIN TIME PARTIAL: CPT

## 2025-05-04 PROCEDURE — 51702 INSERT TEMP BLADDER CATH: CPT

## 2025-05-04 PROCEDURE — 36415 COLL VENOUS BLD VENIPUNCTURE: CPT

## 2025-05-04 PROCEDURE — 80053 COMPREHEN METABOLIC PANEL: CPT

## 2025-05-04 PROCEDURE — 81003 URINALYSIS AUTO W/O SCOPE: CPT

## 2025-05-04 PROCEDURE — 83735 ASSAY OF MAGNESIUM: CPT

## 2025-05-04 NOTE — ED ADULT NURSE NOTE - OBJECTIVE STATEMENT
58 yo male PMH ruptured cerebral aneurysm, A&ox4, presents to ED c/o urinary symptoms.  Pt reports urinary frequency/urge x 2 weeks, denies fever/chills.  Breathing even and unlabored, abdomen soft nontender, no pedal edema. Pt denies chest pain, palpitations, shortness of breath, headache, visual disturbances, numbness/tingling, fever, chills, diaphoresis,  nausea, vomiting, constipation, diarrhea.  20G RAC.

## 2025-05-04 NOTE — ED PROVIDER NOTE - CLINICAL SUMMARY MEDICAL DECISION MAKING FREE TEXT BOX
58-year-old male past medical history of a repaired ACOM aneurysm presenting to ED c/o dysuria. 58-year-old male past medical history of a repaired ACOM aneurysm presenting to ED c/o dysuria. Denies history of prior catheterization.  Patient endorses lower abdominal pain and burning with urination.  Bedside bladder scan revealing a postvoid 800.  Indwelling catheter placed in the ED.  Will send off UA urine culture labs.  UA negative. 58-year-old male past medical history of a repaired ACOM aneurysm presenting to ED c/o dysuria. Denies history of prior catheterization.  Patient endorses lower abdominal pain and burning with urination.  Bedside bladder scan revealing a postvoid 800.  Indwelling catheter placed in the ED.  Will send off UA urine culture labs.  UA negative.  Will d/c home with close urological f/u. 59-year-old male past medical history of a repaired ACOM aneurysm presenting to ED c/o dysuria. Denies history of prior catheterization.  Patient endorses lower abdominal pain and burning with urination.  Bedside bladder scan revealing a postvoid 800.  Indwelling catheter placed in the ED.  Will send off UA urine culture labs.  UA negative.  Will d/c home with close urological f/u.

## 2025-05-04 NOTE — ED PROVIDER NOTE - OBJECTIVE STATEMENT
58-year-old male past medical history of a repaired ACOM aneurysm presenting to ED c/o urinary sxs.   See MDM 59-year-old male past medical history of a repaired ACOM aneurysm presenting to ED c/o urinary sxs.   See MDM

## 2025-05-04 NOTE — ED ADULT NURSE NOTE - NSICDXPASTSURGICALHX_GEN_ALL_CORE_FT
PAST SURGICAL HISTORY:  S/P cerebral aneurysm repair embolization with WEB 10/22/19 ([facility])

## 2025-05-04 NOTE — ED PROVIDER NOTE - PATIENT PORTAL LINK FT
You can access the FollowMyHealth Patient Portal offered by [facility] by registering at the following website: [email or web address]. By joining [employer]’s FollowMyHealth portal, you will also be able to view your health information using other applications (apps) compatible with our system.

## 2025-05-04 NOTE — ED PROVIDER NOTE - NSFOLLOWUPINSTRUCTIONS_ED_ALL_ED_FT
1. You presented to the emergency department for urinary retention.     2. Your evaluation in the emergency department included a physician evaluation. Your work-up did not reveal any findings indicating the need for admission to the hospital or any emergent interventions at this time.     3. It is recommended that you follow-up with your PCP and Urology as arranged by the discharge center for a repeat evaluation, and potentially further testing and treatment.     If needed, to arrange an appointment with a primary care provider please call: [phone]    4. Please continue taking your regular medications as prescribed.     For pain you may take 400-600 mg IBUPROFEN or 500-1000mg ACETAMINOPHEN every 6-8 hours - as needed.  This is an over-the-counter medication - please read the instructions for use and warnings on the label. If you have any questions regarding its use, you may refer them to your local pharmacist.    5. PLEASE RETURN TO THE EMERGENCY DEPARTMENT IMMEDIATELY IF you develop any fevers not responding to over the counter medications, uncontrollable nausea and vomiting, an inability to tolerate eating and drinking, difficulty breathing, chest pain, a severe increase in your symptoms or pain, or any other new symptoms that concern you. 1. You presented to the emergency department for urinary retention.     2. Your evaluation in the emergency department included a physician evaluation. Your work-up did not reveal any findings indicating the need for admission to the hospital or any emergent interventions at this time. You will be discharged with the indwelling catheter.     3. It is recommended that you follow-up with your PCP and Urology as arranged by the discharge center for a repeat evaluation, and potentially further testing and treatment.     If needed, to arrange an appointment with a primary care provider please call: [phone]    4. Please continue taking your regular medications as prescribed.     5. PLEASE RETURN TO THE EMERGENCY DEPARTMENT IMMEDIATELY IF you develop worsening abdominal pain constipation fevers chills burning with urination, or blood in the urine.  If any of the symptoms occur please seek immediate medical attention or return to the emergency department for further evaluation.  We advise that you see your urologist within 1 week of your discharge to repeat the assessment for urinary retention.

## 2025-05-04 NOTE — ED PROVIDER NOTE - NSFOLLOWUPCLINICSTOKEN_GEN_ALL_ED_FT
[id number]:Urgent|| ||00\01||False;[id number]:Urgent|| ||00\01||False;[id number]:Urgent|| ||00\01||False;[id number]:Urgent|| ||00\01||False;[id number]:Urgent|| ||00\01||False;[id number]:Urgent|| ||00\01||False;

## 2025-05-04 NOTE — ED PROVIDER NOTE - ATTENDING CONTRIBUTION TO CARE
Francisco Gabriel MD (Attending Physician):    I performed a history and physical exam of the patient and discussed their management with the resident/fellow/ACP/student. I have reviewed the resident/fellow/ACP/student note and agree with the documented findings and plan of care, except as noted. I have personally performed a substantive portion of the visit including all aspects of the medical decision making. My medical decision making and observations are found below. Please refer to any progress notes for updates on clinical course.    HPI:  59-year-old male with past medical history of a repaired ACOM aneurysm presenting to the ED c/o difficulty urinating. Endorsing intermittent dysuria symptoms a/w urinary urgency and retention x2 weeks. Denies fever, chills, or hematuria. Denies history of prior straight catheterization or quintero placement. Patient endorses lower abdominal pain and burning with urination. Says he hasn't been able to urinate normally for a few hours.    PE:  Vitals noted  GEN - +In mild discomfort, A&Ox3  HEAD - NC/AT  EYES - PERRL, EOMI  ENT - Airway patent, mucous membranes moist  PULMONARY - Normal wob, CTA b/l, symmetric breath sounds, no W/R/R, satting 98% on RA  CARDIAC - +S1S2, RRR, no M/G/R, no JVD  ABDOMEN - +BS, +moderately distended, +diffusely TTP, soft, no guarding, no rebound, no masses, no rigidity   - No CVA TTP b/l  EXTREMITIES - FROM, symmetric pulses, no edema  SKIN - No rash or bruising  NEUROLOGIC - Alert, speech clear, moving all extremities spontaneously, no focal deficits  PSYCH - Normal mood/affect, normal insight    MDM:  DDx includes, but not limited to: urinary retention (possibly 2/2 to BPH), UTI, TIFFANIE. Bladder scan reveals >800cc urine PVR. Quintero catheter placement, labs. Most likely DC home with outpatient urology f/u.

## 2025-05-04 NOTE — ED ADULT NURSE REASSESSMENT NOTE - NS ED NURSE REASSESS COMMENT FT1
Pt unable to provide urine sample and retaining urine. MD made aware and ordered quintero. Procedure explained to pt, pt verbalized understanding. quintero preformed with 2nd RN mikayla using sterile technqiue. Pt tolerated well, 800 cc of urine drained. Pt cleaned, turned and repositioned for safety & comfort.

## 2025-05-04 NOTE — ED PROVIDER NOTE - NSFOLLOWUPCLINICS_GEN_ALL_ED_FT
[facility]  Urology  [address], [place] [address]  Phone: [phone]  Fax:   Follow Up Time: Urgent    [facility] [place]  Urolog  [address] [place] [address]  Phone: [phone]  Fax:   Follow Up Time: Urgent    [facility] [place]  Urology  [address] [address]  Phone: [phone]  Fax:   Follow Up Time: Urgent    [facility]  Urology  [address], 3rd & 4th [place] [address]  Phone: [phone]  Fax:   Follow Up Time: Urgent    [facility]  Urology  [address] [facility], [address] [address]  Phone: [phone]  Fax:   Follow Up Time: Urgent    [facility]  Neurology  [address], [address] [address]  Phone: [phone]  Fax:   Follow Up Time: Urgent

## 2025-05-04 NOTE — ED PROVIDER NOTE - CARE PLAN
Principal Discharge DX:	Dysuria  Assessment and plan of treatment:	EMILY   1 Principal Discharge DX:	Urinary retention

## 2025-05-04 NOTE — ED PROVIDER NOTE - NSICDXPASTSURGICALHX_GEN_ALL_CORE_FT
PAST SURGICAL HISTORY:  S/P cerebral aneurysm repair embolization with WEB 10/22/19 ([facility])

## 2025-05-04 NOTE — ED ADULT NURSE NOTE - NSFALLUNIVINTERV_ED_ALL_ED
Bed/Stretcher in lowest position, wheels locked, appropriate side rails in place/Call bell, personal items and telephone in reach/Instruct patient to call for assistance before getting out of bed/chair/stretcher/Non-slip footwear applied when patient is off stretcher/[place] to call system/Physically safe environment - no spills, clutter or unnecessary equipment/Purposeful proactive rounding/Room/bathroom lighting operational, light cord in reach

## 2025-05-04 NOTE — ED PROVIDER NOTE - PROGRESS NOTE DETAILS
Francisco Gabriel MD (Attending Physician): Carpio catheter place which drained over 800cc urine. Labs non-actionable. Pt was re-evaluated at bedside, VSS, feeling better overall. Results were discussed with patient as well as return precautions and follow up plan with urologist. Time was taken to answer any questions that the patient had before providing them with discharge paperwork.

## 2025-05-05 ENCOUNTER — NON-APPOINTMENT (OUTPATIENT)
Age: 60
End: 2025-05-05

## 2025-05-05 ENCOUNTER — APPOINTMENT (OUTPATIENT)
Dept: UROLOGY | Facility: CLINIC | Age: 60
End: 2025-05-05
Payer: COMMERCIAL

## 2025-05-05 VITALS
OXYGEN SATURATION: 99 % | TEMPERATURE: 98.2 F | BODY MASS INDEX: 25.76 KG/M2 | HEART RATE: 64 BPM | RESPIRATION RATE: 17 BRPM | WEIGHT: 170 LBS | DIASTOLIC BLOOD PRESSURE: 101 MMHG | HEIGHT: 68 IN | SYSTOLIC BLOOD PRESSURE: 149 MMHG

## 2025-05-05 DIAGNOSIS — R31.0 GROSS HEMATURIA: ICD-10-CM

## 2025-05-05 PROCEDURE — 99204 OFFICE O/P NEW MOD 45 MIN: CPT

## 2025-05-05 RX ORDER — TAMSULOSIN HYDROCHLORIDE 0.4 MG/1
0.4 CAPSULE ORAL
Qty: 90 | Refills: 3 | Status: ACTIVE | COMMUNITY
Start: 2025-05-05 | End: 1900-01-01

## 2025-05-08 ENCOUNTER — APPOINTMENT (OUTPATIENT)
Dept: UROLOGY | Facility: CLINIC | Age: 60
End: 2025-05-08

## 2025-05-08 ENCOUNTER — OUTPATIENT (OUTPATIENT)
Dept: OUTPATIENT SERVICES | Facility: HOSPITAL | Age: 60
LOS: 1 days | End: 2025-05-08
Payer: COMMERCIAL

## 2025-05-08 VITALS — DIASTOLIC BLOOD PRESSURE: 95 MMHG | HEART RATE: 94 BPM | SYSTOLIC BLOOD PRESSURE: 142 MMHG

## 2025-05-08 DIAGNOSIS — N40.1 BENIGN PROSTATIC HYPERPLASIA WITH LOWER URINARY TRACT SYMPMS: ICD-10-CM

## 2025-05-08 DIAGNOSIS — R33.9 RETENTION OF URINE, UNSPECIFIED: ICD-10-CM

## 2025-05-08 DIAGNOSIS — N13.8 BENIGN PROSTATIC HYPERPLASIA WITH LOWER URINARY TRACT SYMPMS: ICD-10-CM

## 2025-05-08 DIAGNOSIS — R30.0 DYSURIA: ICD-10-CM

## 2025-05-08 DIAGNOSIS — Z98.890 OTHER SPECIFIED POSTPROCEDURAL STATES: Chronic | ICD-10-CM

## 2025-05-08 DIAGNOSIS — R35.0 FREQUENCY OF MICTURITION: ICD-10-CM

## 2025-05-08 PROCEDURE — 51700 IRRIGATION OF BLADDER: CPT

## 2025-05-09 DIAGNOSIS — R33.9 RETENTION OF URINE, UNSPECIFIED: ICD-10-CM

## 2025-05-09 DIAGNOSIS — N40.1 BENIGN PROSTATIC HYPERPLASIA WITH LOWER URINARY TRACT SYMPTOMS: ICD-10-CM

## 2025-05-09 PROBLEM — R30.0 DYSURIA: Status: ACTIVE | Noted: 2025-05-09

## 2025-05-10 ENCOUNTER — NON-APPOINTMENT (OUTPATIENT)
Age: 60
End: 2025-05-10

## 2025-05-12 ENCOUNTER — NON-APPOINTMENT (OUTPATIENT)
Age: 60
End: 2025-05-12

## 2025-05-12 LAB
APPEARANCE: CLEAR
BACTERIA UR CULT: NORMAL
BACTERIA: ABNORMAL /HPF
BILIRUBIN URINE: NEGATIVE
BLOOD URINE: ABNORMAL
CAST: 0 /LPF
COLOR: YELLOW
EPITHELIAL CELLS: 0 /HPF
GLUCOSE QUALITATIVE U: NEGATIVE MG/DL
KETONES URINE: ABNORMAL MG/DL
LEUKOCYTE ESTERASE URINE: ABNORMAL
MICROSCOPIC-UA: NORMAL
NITRITE URINE: POSITIVE
PH URINE: 7
PROTEIN URINE: 30 MG/DL
RED BLOOD CELLS URINE: 2 /HPF
REVIEW: NORMAL
SPECIFIC GRAVITY URINE: 1.02
UROBILINOGEN URINE: 0.2 MG/DL
WHITE BLOOD CELLS URINE: 124 /HPF

## 2025-05-17 ENCOUNTER — NON-APPOINTMENT (OUTPATIENT)
Age: 60
End: 2025-05-17

## 2025-05-19 ENCOUNTER — NON-APPOINTMENT (OUTPATIENT)
Age: 60
End: 2025-05-19

## 2025-05-21 ENCOUNTER — APPOINTMENT (OUTPATIENT)
Dept: UROLOGY | Facility: CLINIC | Age: 60
End: 2025-05-21

## 2025-05-30 ENCOUNTER — APPOINTMENT (OUTPATIENT)
Dept: ULTRASOUND IMAGING | Facility: IMAGING CENTER | Age: 60
End: 2025-05-30
Payer: COMMERCIAL

## 2025-05-30 ENCOUNTER — OUTPATIENT (OUTPATIENT)
Dept: OUTPATIENT SERVICES | Facility: HOSPITAL | Age: 60
LOS: 1 days | End: 2025-05-30

## 2025-05-30 ENCOUNTER — OUTPATIENT (OUTPATIENT)
Dept: OUTPATIENT SERVICES | Facility: HOSPITAL | Age: 60
LOS: 1 days | End: 2025-05-30
Payer: COMMERCIAL

## 2025-05-30 DIAGNOSIS — R33.9 RETENTION OF URINE, UNSPECIFIED: ICD-10-CM

## 2025-05-30 DIAGNOSIS — Z00.8 ENCOUNTER FOR OTHER GENERAL EXAMINATION: ICD-10-CM

## 2025-05-30 DIAGNOSIS — R31.0 GROSS HEMATURIA: ICD-10-CM

## 2025-05-30 DIAGNOSIS — Z98.890 OTHER SPECIFIED POSTPROCEDURAL STATES: Chronic | ICD-10-CM

## 2025-05-30 PROCEDURE — 76770 US EXAM ABDO BACK WALL COMP: CPT | Mod: 26

## 2025-05-30 PROCEDURE — 76770 US EXAM ABDO BACK WALL COMP: CPT

## 2025-06-03 ENCOUNTER — APPOINTMENT (OUTPATIENT)
Dept: UROLOGY | Facility: CLINIC | Age: 60
End: 2025-06-03
Payer: COMMERCIAL

## 2025-06-03 VITALS — SYSTOLIC BLOOD PRESSURE: 146 MMHG | DIASTOLIC BLOOD PRESSURE: 92 MMHG | HEART RATE: 92 BPM

## 2025-06-03 DIAGNOSIS — R33.9 RETENTION OF URINE, UNSPECIFIED: ICD-10-CM

## 2025-06-03 DIAGNOSIS — N28.1 CYST OF KIDNEY, ACQUIRED: ICD-10-CM

## 2025-06-03 DIAGNOSIS — Q64.79 OTHER CONGENITAL MALFORMATIONS OF BLADDER AND URETHRA: ICD-10-CM

## 2025-06-03 DIAGNOSIS — N13.8 BENIGN PROSTATIC HYPERPLASIA WITH LOWER URINARY TRACT SYMPMS: ICD-10-CM

## 2025-06-03 DIAGNOSIS — R39.9 UNSPECIFIED SYMPTOMS AND SIGNS INVOLVING THE GENITOURINARY SYSTEM: ICD-10-CM

## 2025-06-03 DIAGNOSIS — N40.1 BENIGN PROSTATIC HYPERPLASIA WITH LOWER URINARY TRACT SYMPMS: ICD-10-CM

## 2025-06-03 DIAGNOSIS — N32.89 OTHER SPECIFIED DISORDERS OF BLADDER: ICD-10-CM

## 2025-06-03 DIAGNOSIS — R35.0 FREQUENCY OF MICTURITION: ICD-10-CM

## 2025-06-03 DIAGNOSIS — R93.49 ABNORMAL RADIOLOGIC FINDINGS ON DIAGNOSITIC IMAGING OF OTHER URINARY ORGANS: ICD-10-CM

## 2025-06-03 PROCEDURE — 99214 OFFICE O/P EST MOD 30 MIN: CPT | Mod: 25

## 2025-06-03 PROCEDURE — 51798 US URINE CAPACITY MEASURE: CPT

## 2025-06-04 LAB
APPEARANCE: CLEAR
BACTERIA UR CULT: NORMAL
BACTERIA: NEGATIVE /HPF
BILIRUBIN URINE: NEGATIVE
BLOOD URINE: NEGATIVE
CALCIUM OXALATE CRYSTALS: PRESENT
CAST: 1 /LPF
COLOR: YELLOW
EPITHELIAL CELLS: 1 /HPF
GLUCOSE QUALITATIVE U: NEGATIVE MG/DL
KETONES URINE: NEGATIVE MG/DL
LEUKOCYTE ESTERASE URINE: NEGATIVE
MICROSCOPIC-UA: NORMAL
NITRITE URINE: NEGATIVE
PH URINE: 5.5
PROTEIN URINE: NEGATIVE MG/DL
RED BLOOD CELLS URINE: 4 /HPF
REVIEW: NORMAL
SPECIFIC GRAVITY URINE: 1.02
URINE CYTOLOGY: NORMAL
UROBILINOGEN URINE: 0.2 MG/DL
WHITE BLOOD CELLS URINE: 2 /HPF

## 2025-06-11 PROBLEM — R82.998 URINE LEUKOCYTES: Status: ACTIVE | Noted: 2025-06-11

## 2025-06-11 LAB
APPEARANCE: CLEAR
BACTERIA: NEGATIVE /HPF
BILIRUBIN URINE: NEGATIVE
BLOOD URINE: ABNORMAL
CAST: 0 /LPF
COLOR: YELLOW
EPITHELIAL CELLS: 0 /HPF
GLUCOSE QUALITATIVE U: NEGATIVE MG/DL
KETONES URINE: NEGATIVE MG/DL
LEUKOCYTE ESTERASE URINE: ABNORMAL
MICROSCOPIC-UA: NORMAL
NITRITE URINE: NEGATIVE
PH URINE: 6.5
PROTEIN URINE: NORMAL MG/DL
RED BLOOD CELLS URINE: 7 /HPF
SPECIFIC GRAVITY URINE: 1.02
UROBILINOGEN URINE: 0.2 MG/DL
WHITE BLOOD CELLS URINE: 234 /HPF

## 2025-06-11 RX ORDER — PHENAZOPYRIDINE 200 MG/1
200 TABLET, FILM COATED ORAL 3 TIMES DAILY
Qty: 15 | Refills: 0 | Status: ACTIVE | COMMUNITY
Start: 2025-06-11 | End: 1900-01-01

## 2025-06-11 RX ORDER — SULFAMETHOXAZOLE AND TRIMETHOPRIM 800; 160 MG/1; MG/1
800-160 TABLET ORAL TWICE DAILY
Qty: 14 | Refills: 0 | Status: ACTIVE | COMMUNITY
Start: 2025-06-11 | End: 1900-01-01

## 2025-06-12 LAB — BACTERIA UR CULT: NORMAL

## 2025-06-21 ENCOUNTER — APPOINTMENT (OUTPATIENT)
Dept: CT IMAGING | Facility: IMAGING CENTER | Age: 60
End: 2025-06-21

## 2025-06-21 ENCOUNTER — OUTPATIENT (OUTPATIENT)
Dept: OUTPATIENT SERVICES | Facility: HOSPITAL | Age: 60
LOS: 1 days | End: 2025-06-21
Payer: COMMERCIAL

## 2025-06-21 DIAGNOSIS — Z98.890 OTHER SPECIFIED POSTPROCEDURAL STATES: Chronic | ICD-10-CM

## 2025-06-21 DIAGNOSIS — N32.89 OTHER SPECIFIED DISORDERS OF BLADDER: ICD-10-CM

## 2025-06-21 DIAGNOSIS — Z00.8 ENCOUNTER FOR OTHER GENERAL EXAMINATION: ICD-10-CM

## 2025-06-21 PROCEDURE — 74178 CT ABD&PLV WO CNTR FLWD CNTR: CPT

## 2025-06-21 PROCEDURE — 74178 CT ABD&PLV WO CNTR FLWD CNTR: CPT | Mod: 26

## 2025-07-18 LAB
APPEARANCE: CLEAR
BACTERIA UR CULT: NORMAL
BACTERIA: NEGATIVE /HPF
BILIRUBIN URINE: NEGATIVE
BLOOD URINE: NEGATIVE
CAST: 0 /LPF
COLOR: NORMAL
EPITHELIAL CELLS: 2 /HPF
GLUCOSE QUALITATIVE U: NEGATIVE MG/DL
KETONES URINE: NEGATIVE MG/DL
LEUKOCYTE ESTERASE URINE: ABNORMAL
MICROSCOPIC-UA: NORMAL
NITRITE URINE: NEGATIVE
PH URINE: 5.5
PROTEIN URINE: NEGATIVE MG/DL
RED BLOOD CELLS URINE: 0 /HPF
SPECIFIC GRAVITY URINE: 1.02
UROBILINOGEN URINE: 0.2 MG/DL
WHITE BLOOD CELLS URINE: 12 /HPF

## 2025-07-22 ENCOUNTER — NON-APPOINTMENT (OUTPATIENT)
Age: 60
End: 2025-07-22

## 2025-07-22 ENCOUNTER — EMERGENCY (EMERGENCY)
Facility: HOSPITAL | Age: 60
LOS: 1 days | End: 2025-07-22
Attending: STUDENT IN AN ORGANIZED HEALTH CARE EDUCATION/TRAINING PROGRAM
Payer: COMMERCIAL

## 2025-07-22 VITALS
TEMPERATURE: 98 F | WEIGHT: 164.91 LBS | DIASTOLIC BLOOD PRESSURE: 69 MMHG | SYSTOLIC BLOOD PRESSURE: 98 MMHG | HEIGHT: 69 IN | RESPIRATION RATE: 20 BRPM | HEART RATE: 97 BPM | OXYGEN SATURATION: 95 %

## 2025-07-22 VITALS
DIASTOLIC BLOOD PRESSURE: 68 MMHG | HEART RATE: 105 BPM | OXYGEN SATURATION: 100 % | TEMPERATURE: 99 F | SYSTOLIC BLOOD PRESSURE: 117 MMHG | RESPIRATION RATE: 18 BRPM

## 2025-07-22 DIAGNOSIS — Z98.890 OTHER SPECIFIED POSTPROCEDURAL STATES: Chronic | ICD-10-CM

## 2025-07-22 LAB
ALBUMIN SERPL ELPH-MCNC: 4.5 G/DL — SIGNIFICANT CHANGE UP (ref 3.3–5)
ALP SERPL-CCNC: 60 U/L — SIGNIFICANT CHANGE UP (ref 40–120)
ALT FLD-CCNC: 28 U/L — SIGNIFICANT CHANGE UP (ref 10–45)
ANION GAP SERPL CALC-SCNC: 13 MMOL/L — SIGNIFICANT CHANGE UP (ref 5–17)
APPEARANCE UR: ABNORMAL
APTT BLD: 27.3 SEC — SIGNIFICANT CHANGE UP (ref 26.1–36.8)
AST SERPL-CCNC: 19 U/L — SIGNIFICANT CHANGE UP (ref 10–40)
BACTERIA # UR AUTO: ABNORMAL /HPF
BASOPHILS # BLD AUTO: 0.03 K/UL — SIGNIFICANT CHANGE UP (ref 0–0.2)
BASOPHILS NFR BLD AUTO: 0.2 % — SIGNIFICANT CHANGE UP (ref 0–2)
BILIRUB SERPL-MCNC: 1.1 MG/DL — SIGNIFICANT CHANGE UP (ref 0.2–1.2)
BILIRUB UR-MCNC: ABNORMAL
BUN SERPL-MCNC: 18 MG/DL — SIGNIFICANT CHANGE UP (ref 7–23)
CALCIUM SERPL-MCNC: 9.5 MG/DL — SIGNIFICANT CHANGE UP (ref 8.4–10.5)
CAST: 1 /LPF — SIGNIFICANT CHANGE UP (ref 0–4)
CHLORIDE SERPL-SCNC: 102 MMOL/L — SIGNIFICANT CHANGE UP (ref 96–108)
CO2 SERPL-SCNC: 22 MMOL/L — SIGNIFICANT CHANGE UP (ref 22–31)
COLOR SPEC: ABNORMAL
CREAT SERPL-MCNC: 1.21 MG/DL — SIGNIFICANT CHANGE UP (ref 0.5–1.3)
DIFF PNL FLD: NEGATIVE — SIGNIFICANT CHANGE UP
EGFR: 70 ML/MIN/1.73M2 — SIGNIFICANT CHANGE UP
EGFR: 70 ML/MIN/1.73M2 — SIGNIFICANT CHANGE UP
EOSINOPHIL # BLD AUTO: 0.01 K/UL — SIGNIFICANT CHANGE UP (ref 0–0.5)
EOSINOPHIL NFR BLD AUTO: 0.1 % — SIGNIFICANT CHANGE UP (ref 0–6)
FLUAV AG NPH QL: SIGNIFICANT CHANGE UP
FLUBV AG NPH QL: SIGNIFICANT CHANGE UP
GAS PNL BLDV: SIGNIFICANT CHANGE UP
GLUCOSE SERPL-MCNC: 102 MG/DL — HIGH (ref 70–99)
GLUCOSE UR QL: NEGATIVE MG/DL — SIGNIFICANT CHANGE UP
HCT VFR BLD CALC: 44.4 % — SIGNIFICANT CHANGE UP (ref 39–50)
HGB BLD-MCNC: 14.9 G/DL — SIGNIFICANT CHANGE UP (ref 13–17)
IMM GRANULOCYTES # BLD AUTO: 0.06 K/UL — SIGNIFICANT CHANGE UP (ref 0–0.07)
IMM GRANULOCYTES NFR BLD AUTO: 0.4 % — SIGNIFICANT CHANGE UP (ref 0–0.9)
INR BLD: 1.25 RATIO — HIGH (ref 0.85–1.16)
KETONES UR QL: NEGATIVE MG/DL — SIGNIFICANT CHANGE UP
LEUKOCYTE ESTERASE UR-ACNC: ABNORMAL
LYMPHOCYTES # BLD AUTO: 1.51 K/UL — SIGNIFICANT CHANGE UP (ref 1–3.3)
LYMPHOCYTES NFR BLD AUTO: 10.1 % — LOW (ref 13–44)
MCHC RBC-ENTMCNC: 30.3 PG — SIGNIFICANT CHANGE UP (ref 27–34)
MCHC RBC-ENTMCNC: 33.6 G/DL — SIGNIFICANT CHANGE UP (ref 32–36)
MCV RBC AUTO: 90.2 FL — SIGNIFICANT CHANGE UP (ref 80–100)
MONOCYTES # BLD AUTO: 0.77 K/UL — SIGNIFICANT CHANGE UP (ref 0–0.9)
MONOCYTES NFR BLD AUTO: 5.2 % — SIGNIFICANT CHANGE UP (ref 2–14)
NEUTROPHILS # BLD AUTO: 12.5 K/UL — HIGH (ref 1.8–7.4)
NEUTROPHILS NFR BLD AUTO: 84 % — HIGH (ref 43–77)
NITRITE UR-MCNC: POSITIVE
NRBC # BLD AUTO: 0 K/UL — SIGNIFICANT CHANGE UP (ref 0–0)
NRBC # FLD: 0 K/UL — SIGNIFICANT CHANGE UP (ref 0–0)
NRBC BLD AUTO-RTO: 0 /100 WBCS — SIGNIFICANT CHANGE UP (ref 0–0)
PH UR: 7 — SIGNIFICANT CHANGE UP (ref 5–8)
PLATELET # BLD AUTO: 180 K/UL — SIGNIFICANT CHANGE UP (ref 150–400)
PMV BLD: 8.7 FL — SIGNIFICANT CHANGE UP (ref 7–13)
POTASSIUM SERPL-MCNC: 3.7 MMOL/L — SIGNIFICANT CHANGE UP (ref 3.5–5.3)
POTASSIUM SERPL-SCNC: 3.7 MMOL/L — SIGNIFICANT CHANGE UP (ref 3.5–5.3)
PROT SERPL-MCNC: 8.1 G/DL — SIGNIFICANT CHANGE UP (ref 6–8.3)
PROT UR-MCNC: 100 MG/DL
PROTHROM AB SERPL-ACNC: 14.2 SEC — HIGH (ref 9.9–13.4)
RBC # BLD: 4.92 M/UL — SIGNIFICANT CHANGE UP (ref 4.2–5.8)
RBC # FLD: 12.9 % — SIGNIFICANT CHANGE UP (ref 10.3–14.5)
RBC CASTS # UR COMP ASSIST: 49 /HPF — HIGH (ref 0–4)
REVIEW: SIGNIFICANT CHANGE UP
RSV RNA NPH QL NAA+NON-PROBE: SIGNIFICANT CHANGE UP
SARS-COV-2 RNA SPEC QL NAA+PROBE: SIGNIFICANT CHANGE UP
SODIUM SERPL-SCNC: 137 MMOL/L — SIGNIFICANT CHANGE UP (ref 135–145)
SOURCE RESPIRATORY: SIGNIFICANT CHANGE UP
SP GR SPEC: 1.03 — SIGNIFICANT CHANGE UP (ref 1–1.03)
SQUAMOUS # UR AUTO: 0 /HPF — SIGNIFICANT CHANGE UP (ref 0–5)
UROBILINOGEN FLD QL: 1 MG/DL — SIGNIFICANT CHANGE UP (ref 0.2–1)
WBC # BLD: 14.88 K/UL — HIGH (ref 3.8–10.5)
WBC # FLD AUTO: 14.88 K/UL — HIGH (ref 3.8–10.5)
WBC UR QL: 216 /HPF — HIGH (ref 0–5)
YEAST-LIKE CELLS: PRESENT

## 2025-07-22 PROCEDURE — 85014 HEMATOCRIT: CPT

## 2025-07-22 PROCEDURE — 87040 BLOOD CULTURE FOR BACTERIA: CPT

## 2025-07-22 PROCEDURE — 87086 URINE CULTURE/COLONY COUNT: CPT

## 2025-07-22 PROCEDURE — 81001 URINALYSIS AUTO W/SCOPE: CPT

## 2025-07-22 PROCEDURE — 85610 PROTHROMBIN TIME: CPT

## 2025-07-22 PROCEDURE — 82947 ASSAY GLUCOSE BLOOD QUANT: CPT

## 2025-07-22 PROCEDURE — 82435 ASSAY OF BLOOD CHLORIDE: CPT

## 2025-07-22 PROCEDURE — 80053 COMPREHEN METABOLIC PANEL: CPT

## 2025-07-22 PROCEDURE — 84295 ASSAY OF SERUM SODIUM: CPT

## 2025-07-22 PROCEDURE — 85018 HEMOGLOBIN: CPT

## 2025-07-22 PROCEDURE — 87150 DNA/RNA AMPLIFIED PROBE: CPT

## 2025-07-22 PROCEDURE — 83605 ASSAY OF LACTIC ACID: CPT

## 2025-07-22 PROCEDURE — 99285 EMERGENCY DEPT VISIT HI MDM: CPT

## 2025-07-22 PROCEDURE — 99284 EMERGENCY DEPT VISIT MOD MDM: CPT | Mod: 25

## 2025-07-22 PROCEDURE — 87637 SARSCOV2&INF A&B&RSV AMP PRB: CPT

## 2025-07-22 PROCEDURE — 82330 ASSAY OF CALCIUM: CPT

## 2025-07-22 PROCEDURE — 96374 THER/PROPH/DIAG INJ IV PUSH: CPT

## 2025-07-22 PROCEDURE — 82803 BLOOD GASES ANY COMBINATION: CPT

## 2025-07-22 PROCEDURE — 87186 SC STD MICRODIL/AGAR DIL: CPT

## 2025-07-22 PROCEDURE — 87077 CULTURE AEROBIC IDENTIFY: CPT

## 2025-07-22 PROCEDURE — 93010 ELECTROCARDIOGRAM REPORT: CPT

## 2025-07-22 PROCEDURE — 36415 COLL VENOUS BLD VENIPUNCTURE: CPT

## 2025-07-22 PROCEDURE — 93005 ELECTROCARDIOGRAM TRACING: CPT

## 2025-07-22 PROCEDURE — 84132 ASSAY OF SERUM POTASSIUM: CPT

## 2025-07-22 PROCEDURE — 85730 THROMBOPLASTIN TIME PARTIAL: CPT

## 2025-07-22 PROCEDURE — 85025 COMPLETE CBC W/AUTO DIFF WBC: CPT

## 2025-07-22 RX ORDER — CEFPODOXIME PROXETIL 200 MG/1
200 TABLET, FILM COATED ORAL ONCE
Refills: 0 | Status: COMPLETED | OUTPATIENT
Start: 2025-07-22 | End: 2025-07-22

## 2025-07-22 RX ORDER — CEFPODOXIME PROXETIL 200 MG/1
1 TABLET, FILM COATED ORAL
Qty: 14 | Refills: 0
Start: 2025-07-22 | End: 2025-07-28

## 2025-07-22 RX ORDER — CEFTRIAXONE 500 MG/1
1000 INJECTION, POWDER, FOR SOLUTION INTRAMUSCULAR; INTRAVENOUS ONCE
Refills: 0 | Status: DISCONTINUED | OUTPATIENT
Start: 2025-07-22 | End: 2025-07-22

## 2025-07-22 RX ORDER — ACETAMINOPHEN 500 MG/5ML
1000 LIQUID (ML) ORAL ONCE
Refills: 0 | Status: COMPLETED | OUTPATIENT
Start: 2025-07-22 | End: 2025-07-22

## 2025-07-22 RX ADMIN — CEFPODOXIME PROXETIL 200 MILLIGRAM(S): 200 TABLET, FILM COATED ORAL at 21:38

## 2025-07-22 RX ADMIN — Medication 400 MILLIGRAM(S): at 20:55

## 2025-07-22 NOTE — ED PROVIDER NOTE - NSICDXPASTSURGICALHX_GEN_ALL_CORE_FT
PAST SURGICAL HISTORY:  S/P cerebral aneurysm repair embolization with WEB 10/22/19 ([facility])

## 2025-07-22 NOTE — ED ADULT TRIAGE NOTE - AS HEIGHT TYPE
"Plaquenil  Last Written Prescription Date:  04/22/2019  Last Fill Quantity: 450,  # refills: 0   Last office visit: 5/6/2019 with prescribing provider:  Schoen Future Office Visit:  None  Routing refill request to provider for review/approval because:  Drug not on the [facility] refill protocol     Lipitor  Last Written Prescription Date:  04/22/2019  Last Fill Quantity: 90,  # refills: 0   Last office visit: 5/6/2019 with prescribing provider:  Schoen   Future Office Visit:  None  Routing refill request to provider for review/approval because:  Drug interaction warning    Requested Prescriptions   Pending Prescriptions Disp Refills     atorvastatin (LIPITOR) 40 MG tablet [Pharmacy Med Name: ATORVASTATIN 40MG TABLETS] 90 tablet 0     Sig: TAKE ONE TABLET BY MOUTH DAILY       Statins Protocol Passed - 7/18/2019  5:11 AM        Passed - LDL on file in past 12 months     Recent Labs   Lab Test 05/11/19  0823   LDL 79           Passed - No abnormal creatine kinase in past 12 months     No lab results found.         Passed - Recent (12 mo) or future (30 days) visit within the authorizing provider's specialty     Patient had office visit in the last 12 months or has a visit in the next 30 days with authorizing provider or within the authorizing provider's specialty.  See \"Patient Info\" tab in inbasket, or \"Choose Columns\" in Meds & Orders section of the refill encounter.          Passed - Medication is active on med list        Passed - Patient is age 18 or older        Passed - No active pregnancy on record        Passed - No positive pregnancy test in past 12 months        hydroxychloroquine (PLAQUENIL) 200 MG tablet [Pharmacy Med Name: HYDROXYCHLOROQUINE 200MG TABLETS] 450 tablet 0     Sig: TAKE 2 AND 1/2 TABLETS BY MOUTH EVERY DAY       There is no refill protocol information for this order      Ana Lilia Pablo RN   "
stated

## 2025-07-22 NOTE — ED PROVIDER NOTE - NSFOLLOWUPINSTRUCTIONS_ED_ALL_ED_FT
You were seen in the ED for a urinary tract infection.  You are given medications for your symptoms of abdominal pain.  A prescription was sent to your pharmacy, please take as prescribed.  No further acute interventions required in the ED.  Please follow-up with your urologist and your primary care doctor as discussed.  Please return to the ED for any worsening symptoms.    Urinary Tract Infection    A urinary tract infection (UTI) is an infection of any part of the urinary tract, which includes the kidneys, ureters, bladder, and urethra. Risk factors include ignoring your need to urinate, wiping back to front if female, being an uncircumcised male, and having diabetes or a weak immune system. Symptoms include frequent urination, pain or burning with urination, foul smelling urine, cloudy urine, pain in the lower abdomen, blood in the urine, and fever. If you were prescribed an antibiotic medicine, take it as told by your health care provider. Do not stop taking the antibiotic even if you start to feel better.    SEEK IMMEDIATE MEDICAL CARE IF YOU HAVE ANY OF THE FOLLOWING SYMPTOMS: severe back or abdominal pain, fever, inability to keep fluids or medicine down, dizziness/lightheadedness, or a change in mental status.

## 2025-07-22 NOTE — ED ADULT NURSE NOTE - NSICDXPASTSURGICALHX_GEN_ALL_CORE_FT
PAST SURGICAL HISTORY:  S/P cerebral aneurysm repair embolization with WEB 10/22/19 ([facility])

## 2025-07-22 NOTE — ED PROVIDER NOTE - ATTENDING CONTRIBUTION TO CARE
Middle aged male p/w fevers, rigors and body pain when febrile. Clinically well appearing; denies any urinary symptoms, but had UA done at urgent care and told to come in for evaluation. No CVA tenderness, no bladder obstruction on bedside ultrasound and so doubt kidney stones/kidney abscess. New cough here. Plan for infectious workup and if labwork negative; likely d/c.

## 2025-07-22 NOTE — ED ADULT NURSE NOTE - CHIEF COMPLAINT QUOTE
Indication: Atrial Fibrillation (CHADS-2 = 2 - HTN, DM);   Goal INR: 2.0-3.0  Duration: long-term  Pertinent History:    VM left for pt to continue with current Warfarin dose on file of 7.5 Wed/Fr, 5 mg ROW. Direct ph left t ocall back with any significant findings such as changes in diet,or upcoming procedures/surgeries.    Assessment    High-Risk Medications: TBD  Significant Findings: TBD  Hospitalizations / Procedures: TBD  Other: None    Plan    INR Result: 2.2  The INR result for today is therapeutic based on the INR goal 2.0-3.0.  Recommended Dose:  VM left for pt to continue 7.5 mg W/F;5 mg ROW (40 mg/wk)  Follow-Up: 2 weeks, 9/10/19 - self-test  Comments: None             sent in by  to r/o pyelonephritis   + UTI at  today. fevers started today. 2 advil given at UC

## 2025-07-22 NOTE — ED PROVIDER NOTE - CLINICAL SUMMARY MEDICAL DECISION MAKING FREE TEXT BOX
Patient is a 60 y/o M with PMHx BPH (on tamsulosin), HTN, SAH 2019, vitiligo who p/w chief concern of fevers and generalized body aches. Plan for full infectious w/u. POCUS bladder no retention.

## 2025-07-22 NOTE — ED PROVIDER NOTE - PATIENT PORTAL LINK FT
You can access the FollowMyHealth Patient Portal offered by [facility] by registering at the following website: [email or web address]. By joining [facility]’s FollowMyHealth portal, you will also be able to view your health information using other applications (apps) compatible with our system.

## 2025-07-22 NOTE — ED PROVIDER NOTE - NSICDXPASTMEDICALHX_GEN_ALL_CORE_FT
PAST MEDICAL HISTORY:  BPH (benign prostatic hyperplasia)     HTN (hypertension)     Ruptured cerebral aneurysm 10/21/19    Vitiligo

## 2025-07-22 NOTE — ED PROVIDER NOTE - OBJECTIVE STATEMENT
Patient is a 60 y/o M with PMHx BPH (on tamsulosin), HTN, SAH 2019, vitiligo who p/w chief concern of fevers and generalized body aches. Patient has had recurrent UTIs since May 2025 and had quintero catheter placed, but subsequently removed a few days later by outpatient urologist and had UTI that was treated with abx. Patient had renal US and CTAP urogram in June 2025 with bladder wall thickening noticed on both, and prominent prostate on CTAP. Patient went to urgent care today and had UA with positive leukocyte esterase and nitrates iso fevers that started today. Was sent to ED for c/f pyelo.    Patient denies dysuria, frequency, changes in urine color. Also denies any dyspnea, CP, lightheadedness, dizziness. Endorses cough that began at 3am today. Reportedly took tylenol at 1pm and received ibuprofen at  prior to ED presentation.

## 2025-07-22 NOTE — ED PROVIDER NOTE - PHYSICAL EXAMINATION
GENERAL: NAD, lying in bed comfortably  HEAD:  Atraumatic, normocephalic  EYES: EOMI, PERRLA, conjunctiva and sclera clear  NECK: Supple, trachea midline, no JVD  HEART: Regular rate and rhythm, no murmurs, rubs, or gallops  LUNGS: Unlabored respirations.  Clear to auscultation bilaterally, no crackles, wheezing, or rhonchi  ABDOMEN: Soft, nontender, nondistended, +BS  BACK: (-) CVA tenderness b/l  EXTREMITIES: 2+ peripheral pulses bilaterally. No clubbing, cyanosis, or edema  NERVOUS SYSTEM:  A&Ox3, moving all extremities, no focal deficits   SKIN: No rashes or lesions

## 2025-07-22 NOTE — ED ADULT NURSE NOTE - OBJECTIVE STATEMENT
59y M presents to the ED from urgent care for r/o pyelo. Pt reports having a quintero cath placed in May 2025. Pt had quintero in for approx 5days. Since the quintero was placed and removed, pt states he has completed 3 courses of abx. Pt reports fevers, generalized body aches started this AM. Pt denies urinary symptoms, flank pain. Pt received "2 ibuprofens" at urgent care prior to ED arrival. Pt afebrile in the ED. Pt aox4. Family at bedside. COmfort and safety maintained.

## 2025-07-24 ENCOUNTER — INPATIENT (INPATIENT)
Facility: HOSPITAL | Age: 60
LOS: 1 days | Discharge: HOME | DRG: 872 | End: 2025-07-26
Attending: STUDENT IN AN ORGANIZED HEALTH CARE EDUCATION/TRAINING PROGRAM | Admitting: STUDENT IN AN ORGANIZED HEALTH CARE EDUCATION/TRAINING PROGRAM
Payer: COMMERCIAL

## 2025-07-24 VITALS
RESPIRATION RATE: 20 BRPM | HEART RATE: 108 BPM | DIASTOLIC BLOOD PRESSURE: 85 MMHG | SYSTOLIC BLOOD PRESSURE: 136 MMHG | WEIGHT: 164.91 LBS | OXYGEN SATURATION: 95 % | HEIGHT: 69 IN | TEMPERATURE: 102 F

## 2025-07-24 DIAGNOSIS — N40.0 BENIGN PROSTATIC HYPERPLASIA WITHOUT LOWER URINARY TRACT SYMPTOMS: ICD-10-CM

## 2025-07-24 DIAGNOSIS — A41.9 SEPSIS, UNSPECIFIED ORGANISM: ICD-10-CM

## 2025-07-24 DIAGNOSIS — A41.50 GRAM-NEGATIVE SEPSIS, UNSPECIFIED: ICD-10-CM

## 2025-07-24 DIAGNOSIS — Z98.890 OTHER SPECIFIED POSTPROCEDURAL STATES: Chronic | ICD-10-CM

## 2025-07-24 DIAGNOSIS — R78.81 BACTEREMIA: ICD-10-CM

## 2025-07-24 DIAGNOSIS — I10 ESSENTIAL (PRIMARY) HYPERTENSION: ICD-10-CM

## 2025-07-24 DIAGNOSIS — Z29.9 ENCOUNTER FOR PROPHYLACTIC MEASURES, UNSPECIFIED: ICD-10-CM

## 2025-07-24 LAB
ALBUMIN SERPL ELPH-MCNC: 3.7 G/DL — SIGNIFICANT CHANGE UP (ref 3.3–5)
ALP SERPL-CCNC: 48 U/L — SIGNIFICANT CHANGE UP (ref 40–120)
ALT FLD-CCNC: 41 U/L — SIGNIFICANT CHANGE UP (ref 10–45)
ANION GAP SERPL CALC-SCNC: 13 MMOL/L — SIGNIFICANT CHANGE UP (ref 5–17)
APPEARANCE UR: CLEAR — SIGNIFICANT CHANGE UP
AST SERPL-CCNC: 23 U/L — SIGNIFICANT CHANGE UP (ref 10–40)
BACTERIA # UR AUTO: NEGATIVE /HPF — SIGNIFICANT CHANGE UP
BASOPHILS # BLD AUTO: 0.01 K/UL — SIGNIFICANT CHANGE UP (ref 0–0.2)
BASOPHILS NFR BLD AUTO: 0.1 % — SIGNIFICANT CHANGE UP (ref 0–2)
BILIRUB SERPL-MCNC: 0.6 MG/DL — SIGNIFICANT CHANGE UP (ref 0.2–1.2)
BILIRUB UR-MCNC: NEGATIVE — SIGNIFICANT CHANGE UP
BUN SERPL-MCNC: 16 MG/DL — SIGNIFICANT CHANGE UP (ref 7–23)
CALCIUM SERPL-MCNC: 8.7 MG/DL — SIGNIFICANT CHANGE UP (ref 8.4–10.5)
CAST: 1 /LPF — SIGNIFICANT CHANGE UP (ref 0–4)
CHLORIDE SERPL-SCNC: 102 MMOL/L — SIGNIFICANT CHANGE UP (ref 96–108)
CO2 SERPL-SCNC: 22 MMOL/L — SIGNIFICANT CHANGE UP (ref 22–31)
COLOR SPEC: YELLOW — SIGNIFICANT CHANGE UP
CREAT SERPL-MCNC: 0.96 MG/DL — SIGNIFICANT CHANGE UP (ref 0.5–1.3)
DIFF PNL FLD: ABNORMAL
EGFR: 92 ML/MIN/1.73M2 — SIGNIFICANT CHANGE UP
EGFR: 92 ML/MIN/1.73M2 — SIGNIFICANT CHANGE UP
EOSINOPHIL # BLD AUTO: 0.01 K/UL — SIGNIFICANT CHANGE UP (ref 0–0.5)
EOSINOPHIL NFR BLD AUTO: 0.1 % — SIGNIFICANT CHANGE UP (ref 0–6)
GAS PNL BLDV: SIGNIFICANT CHANGE UP
GLUCOSE SERPL-MCNC: 129 MG/DL — HIGH (ref 70–99)
GLUCOSE UR QL: NEGATIVE MG/DL — SIGNIFICANT CHANGE UP
GRAM STN FLD: ABNORMAL
HCT VFR BLD CALC: 41.2 % — SIGNIFICANT CHANGE UP (ref 39–50)
HGB BLD-MCNC: 14 G/DL — SIGNIFICANT CHANGE UP (ref 13–17)
IMM GRANULOCYTES # BLD AUTO: 0.05 K/UL — SIGNIFICANT CHANGE UP (ref 0–0.07)
IMM GRANULOCYTES NFR BLD AUTO: 0.5 % — SIGNIFICANT CHANGE UP (ref 0–0.9)
KETONES UR QL: ABNORMAL MG/DL
LEUKOCYTE ESTERASE UR-ACNC: ABNORMAL
LYMPHOCYTES # BLD AUTO: 1.11 K/UL — SIGNIFICANT CHANGE UP (ref 1–3.3)
LYMPHOCYTES NFR BLD AUTO: 11.4 % — LOW (ref 13–44)
MCHC RBC-ENTMCNC: 29.8 PG — SIGNIFICANT CHANGE UP (ref 27–34)
MCHC RBC-ENTMCNC: 34 G/DL — SIGNIFICANT CHANGE UP (ref 32–36)
MCV RBC AUTO: 87.7 FL — SIGNIFICANT CHANGE UP (ref 80–100)
METHOD TYPE: SIGNIFICANT CHANGE UP
MONOCYTES # BLD AUTO: 1.34 K/UL — HIGH (ref 0–0.9)
MONOCYTES NFR BLD AUTO: 13.8 % — SIGNIFICANT CHANGE UP (ref 2–14)
NEUTROPHILS # BLD AUTO: 7.19 K/UL — SIGNIFICANT CHANGE UP (ref 1.8–7.4)
NEUTROPHILS NFR BLD AUTO: 74.1 % — SIGNIFICANT CHANGE UP (ref 43–77)
NITRITE UR-MCNC: NEGATIVE — SIGNIFICANT CHANGE UP
NRBC # BLD AUTO: 0 K/UL — SIGNIFICANT CHANGE UP (ref 0–0)
NRBC # FLD: 0 K/UL — SIGNIFICANT CHANGE UP (ref 0–0)
NRBC BLD AUTO-RTO: 0 /100 WBCS — SIGNIFICANT CHANGE UP (ref 0–0)
PH UR: 7 — SIGNIFICANT CHANGE UP (ref 5–8)
PLATELET # BLD AUTO: 136 K/UL — LOW (ref 150–400)
PMV BLD: 9.3 FL — SIGNIFICANT CHANGE UP (ref 7–13)
POTASSIUM SERPL-MCNC: 3.6 MMOL/L — SIGNIFICANT CHANGE UP (ref 3.5–5.3)
POTASSIUM SERPL-SCNC: 3.6 MMOL/L — SIGNIFICANT CHANGE UP (ref 3.5–5.3)
PROT SERPL-MCNC: 7.1 G/DL — SIGNIFICANT CHANGE UP (ref 6–8.3)
PROT UR-MCNC: 30 MG/DL
PROTEUS SP DNA BLD POS QL NAA+NON-PROBE: SIGNIFICANT CHANGE UP
RBC # BLD: 4.7 M/UL — SIGNIFICANT CHANGE UP (ref 4.2–5.8)
RBC # FLD: 12.9 % — SIGNIFICANT CHANGE UP (ref 10.3–14.5)
RBC CASTS # UR COMP ASSIST: 60 /HPF — HIGH (ref 0–4)
SODIUM SERPL-SCNC: 137 MMOL/L — SIGNIFICANT CHANGE UP (ref 135–145)
SP GR SPEC: 1.02 — SIGNIFICANT CHANGE UP (ref 1–1.03)
SQUAMOUS # UR AUTO: 1 /HPF — SIGNIFICANT CHANGE UP (ref 0–5)
UROBILINOGEN FLD QL: 1 MG/DL — SIGNIFICANT CHANGE UP (ref 0.2–1)
WBC # BLD: 9.71 K/UL — SIGNIFICANT CHANGE UP (ref 3.8–10.5)
WBC # FLD AUTO: 9.71 K/UL — SIGNIFICANT CHANGE UP (ref 3.8–10.5)
WBC UR QL: 27 /HPF — HIGH (ref 0–5)

## 2025-07-24 PROCEDURE — 82803 BLOOD GASES ANY COMBINATION: CPT

## 2025-07-24 PROCEDURE — 82330 ASSAY OF CALCIUM: CPT

## 2025-07-24 PROCEDURE — 85018 HEMOGLOBIN: CPT

## 2025-07-24 PROCEDURE — 93010 ELECTROCARDIOGRAM REPORT: CPT

## 2025-07-24 PROCEDURE — 85014 HEMATOCRIT: CPT

## 2025-07-24 PROCEDURE — 99223 1ST HOSP IP/OBS HIGH 75: CPT

## 2025-07-24 PROCEDURE — 99285 EMERGENCY DEPT VISIT HI MDM: CPT

## 2025-07-24 PROCEDURE — 80053 COMPREHEN METABOLIC PANEL: CPT

## 2025-07-24 PROCEDURE — 85025 COMPLETE CBC W/AUTO DIFF WBC: CPT

## 2025-07-24 PROCEDURE — 84132 ASSAY OF SERUM POTASSIUM: CPT

## 2025-07-24 PROCEDURE — 81001 URINALYSIS AUTO W/SCOPE: CPT

## 2025-07-24 PROCEDURE — 84295 ASSAY OF SERUM SODIUM: CPT

## 2025-07-24 PROCEDURE — 82947 ASSAY GLUCOSE BLOOD QUANT: CPT

## 2025-07-24 PROCEDURE — 82435 ASSAY OF BLOOD CHLORIDE: CPT

## 2025-07-24 PROCEDURE — 83605 ASSAY OF LACTIC ACID: CPT

## 2025-07-24 RX ORDER — MEROPENEM 1 G/30ML
1000 INJECTION INTRAVENOUS ONCE
Refills: 0 | Status: COMPLETED | OUTPATIENT
Start: 2025-07-24 | End: 2025-07-24

## 2025-07-24 RX ORDER — TAMSULOSIN HYDROCHLORIDE 0.4 MG/1
1 CAPSULE ORAL
Refills: 0 | DISCHARGE

## 2025-07-24 RX ORDER — MELATONIN 5 MG
3 TABLET ORAL AT BEDTIME
Refills: 0 | Status: DISCONTINUED | OUTPATIENT
Start: 2025-07-24 | End: 2025-07-26

## 2025-07-24 RX ORDER — ACETAMINOPHEN 500 MG/5ML
1000 LIQUID (ML) ORAL ONCE
Refills: 0 | Status: COMPLETED | OUTPATIENT
Start: 2025-07-24 | End: 2025-07-24

## 2025-07-24 RX ORDER — MEROPENEM 1 G/30ML
1000 INJECTION INTRAVENOUS EVERY 8 HOURS
Refills: 0 | Status: DISCONTINUED | OUTPATIENT
Start: 2025-07-24 | End: 2025-07-25

## 2025-07-24 RX ORDER — ONDANSETRON HCL/PF 4 MG/2 ML
4 VIAL (ML) INJECTION EVERY 8 HOURS
Refills: 0 | Status: DISCONTINUED | OUTPATIENT
Start: 2025-07-24 | End: 2025-07-26

## 2025-07-24 RX ORDER — ACETAMINOPHEN 500 MG/5ML
650 LIQUID (ML) ORAL EVERY 6 HOURS
Refills: 0 | Status: DISCONTINUED | OUTPATIENT
Start: 2025-07-24 | End: 2025-07-26

## 2025-07-24 RX ORDER — TAMSULOSIN HYDROCHLORIDE 0.4 MG/1
0.4 CAPSULE ORAL AT BEDTIME
Refills: 0 | Status: DISCONTINUED | OUTPATIENT
Start: 2025-07-24 | End: 2025-07-26

## 2025-07-24 RX ADMIN — MEROPENEM 100 MILLIGRAM(S): 1 INJECTION INTRAVENOUS at 22:45

## 2025-07-24 RX ADMIN — Medication 1000 MILLILITER(S): at 18:08

## 2025-07-24 RX ADMIN — MEROPENEM 100 MILLIGRAM(S): 1 INJECTION INTRAVENOUS at 18:07

## 2025-07-24 RX ADMIN — Medication 1000 MILLILITER(S): at 18:07

## 2025-07-24 RX ADMIN — Medication 1000 MILLIGRAM(S): at 20:20

## 2025-07-24 RX ADMIN — Medication 400 MILLIGRAM(S): at 17:48

## 2025-07-24 NOTE — ED POST DISCHARGE NOTE - RESULT SUMMARY
Called by lab regarding positive prelim BCx results. Called and spoke with patient, and patient's family on speaker phone per patient preference, informed of results and advised to return to ED for further evaluation and management. Patient reports he will return to ER now. - Anastacia Nelson PA-C

## 2025-07-25 LAB
-  AMOXICILLIN/CLAVULANIC ACID: SIGNIFICANT CHANGE UP
-  AMPICILLIN/SULBACTAM: SIGNIFICANT CHANGE UP
-  AMPICILLIN: SIGNIFICANT CHANGE UP
-  AZTREONAM: SIGNIFICANT CHANGE UP
-  CEFAZOLIN: SIGNIFICANT CHANGE UP
-  CEFEPIME: SIGNIFICANT CHANGE UP
-  CEFOXITIN: SIGNIFICANT CHANGE UP
-  CEFTRIAXONE: SIGNIFICANT CHANGE UP
-  CEFUROXIME: SIGNIFICANT CHANGE UP
-  CIPROFLOXACIN: SIGNIFICANT CHANGE UP
-  ERTAPENEM: SIGNIFICANT CHANGE UP
-  GENTAMICIN: SIGNIFICANT CHANGE UP
-  LEVOFLOXACIN: SIGNIFICANT CHANGE UP
-  MEROPENEM: SIGNIFICANT CHANGE UP
-  NITROFURANTOIN: SIGNIFICANT CHANGE UP
-  PIPERACILLIN/TAZOBACTAM: SIGNIFICANT CHANGE UP
-  TOBRAMYCIN: SIGNIFICANT CHANGE UP
-  TRIMETHOPRIM/SULFAMETHOXAZOLE: SIGNIFICANT CHANGE UP
ALBUMIN SERPL ELPH-MCNC: 3.1 G/DL — LOW (ref 3.3–5)
ALP SERPL-CCNC: 42 U/L — SIGNIFICANT CHANGE UP (ref 40–120)
ALT FLD-CCNC: 33 U/L — SIGNIFICANT CHANGE UP (ref 10–45)
ANION GAP SERPL CALC-SCNC: 11 MMOL/L — SIGNIFICANT CHANGE UP (ref 5–17)
AST SERPL-CCNC: 17 U/L — SIGNIFICANT CHANGE UP (ref 10–40)
BILIRUB SERPL-MCNC: 0.6 MG/DL — SIGNIFICANT CHANGE UP (ref 0.2–1.2)
BUN SERPL-MCNC: 12 MG/DL — SIGNIFICANT CHANGE UP (ref 7–23)
CALCIUM SERPL-MCNC: 7.9 MG/DL — LOW (ref 8.4–10.5)
CHLORIDE SERPL-SCNC: 106 MMOL/L — SIGNIFICANT CHANGE UP (ref 96–108)
CO2 SERPL-SCNC: 17 MMOL/L — LOW (ref 22–31)
CREAT SERPL-MCNC: 0.86 MG/DL — SIGNIFICANT CHANGE UP (ref 0.5–1.3)
CULTURE RESULTS: ABNORMAL
CULTURE RESULTS: SIGNIFICANT CHANGE UP
EGFR: 101 ML/MIN/1.73M2 — SIGNIFICANT CHANGE UP
EGFR: 101 ML/MIN/1.73M2 — SIGNIFICANT CHANGE UP
GLUCOSE SERPL-MCNC: 113 MG/DL — HIGH (ref 70–99)
GRAM STN FLD: ABNORMAL
HCT VFR BLD CALC: 37.3 % — LOW (ref 39–50)
HGB BLD-MCNC: 12.7 G/DL — LOW (ref 13–17)
MCHC RBC-ENTMCNC: 30.2 PG — SIGNIFICANT CHANGE UP (ref 27–34)
MCHC RBC-ENTMCNC: 34 G/DL — SIGNIFICANT CHANGE UP (ref 32–36)
MCV RBC AUTO: 88.8 FL — SIGNIFICANT CHANGE UP (ref 80–100)
METHOD TYPE: SIGNIFICANT CHANGE UP
NRBC # BLD AUTO: 0 K/UL — SIGNIFICANT CHANGE UP (ref 0–0)
NRBC # FLD: 0 K/UL — SIGNIFICANT CHANGE UP (ref 0–0)
NRBC BLD AUTO-RTO: 0 /100 WBCS — SIGNIFICANT CHANGE UP (ref 0–0)
ORGANISM # SPEC MICROSCOPIC CNT: ABNORMAL
ORGANISM # SPEC MICROSCOPIC CNT: ABNORMAL
PLATELET # BLD AUTO: 130 K/UL — LOW (ref 150–400)
PMV BLD: 9.4 FL — SIGNIFICANT CHANGE UP (ref 7–13)
POTASSIUM SERPL-MCNC: 3.7 MMOL/L — SIGNIFICANT CHANGE UP (ref 3.5–5.3)
POTASSIUM SERPL-SCNC: 3.7 MMOL/L — SIGNIFICANT CHANGE UP (ref 3.5–5.3)
PROT SERPL-MCNC: 6.2 G/DL — SIGNIFICANT CHANGE UP (ref 6–8.3)
RBC # BLD: 4.2 M/UL — SIGNIFICANT CHANGE UP (ref 4.2–5.8)
RBC # FLD: 12.8 % — SIGNIFICANT CHANGE UP (ref 10.3–14.5)
SODIUM SERPL-SCNC: 134 MMOL/L — LOW (ref 135–145)
SPECIMEN SOURCE: SIGNIFICANT CHANGE UP
SPECIMEN SOURCE: SIGNIFICANT CHANGE UP
WBC # BLD: 8.42 K/UL — SIGNIFICANT CHANGE UP (ref 3.8–10.5)
WBC # FLD AUTO: 8.42 K/UL — SIGNIFICANT CHANGE UP (ref 3.8–10.5)

## 2025-07-25 PROCEDURE — 87086 URINE CULTURE/COLONY COUNT: CPT

## 2025-07-25 PROCEDURE — 85014 HEMATOCRIT: CPT

## 2025-07-25 PROCEDURE — 99232 SBSQ HOSP IP/OBS MODERATE 35: CPT

## 2025-07-25 PROCEDURE — 81001 URINALYSIS AUTO W/SCOPE: CPT

## 2025-07-25 PROCEDURE — 84132 ASSAY OF SERUM POTASSIUM: CPT

## 2025-07-25 PROCEDURE — 82330 ASSAY OF CALCIUM: CPT

## 2025-07-25 PROCEDURE — 80053 COMPREHEN METABOLIC PANEL: CPT

## 2025-07-25 PROCEDURE — 82803 BLOOD GASES ANY COMBINATION: CPT

## 2025-07-25 PROCEDURE — 93005 ELECTROCARDIOGRAM TRACING: CPT

## 2025-07-25 PROCEDURE — 87040 BLOOD CULTURE FOR BACTERIA: CPT

## 2025-07-25 PROCEDURE — 84295 ASSAY OF SERUM SODIUM: CPT

## 2025-07-25 PROCEDURE — 85025 COMPLETE CBC W/AUTO DIFF WBC: CPT

## 2025-07-25 PROCEDURE — 83605 ASSAY OF LACTIC ACID: CPT

## 2025-07-25 PROCEDURE — 85018 HEMOGLOBIN: CPT

## 2025-07-25 PROCEDURE — 85027 COMPLETE CBC AUTOMATED: CPT

## 2025-07-25 PROCEDURE — 82435 ASSAY OF BLOOD CHLORIDE: CPT

## 2025-07-25 PROCEDURE — 82947 ASSAY GLUCOSE BLOOD QUANT: CPT

## 2025-07-25 RX ORDER — CEFAZOLIN SODIUM IN 0.9 % NACL 3 G/100 ML
1000 INTRAVENOUS SOLUTION, PIGGYBACK (ML) INTRAVENOUS EVERY 8 HOURS
Refills: 0 | Status: DISCONTINUED | OUTPATIENT
Start: 2025-07-25 | End: 2025-07-25

## 2025-07-25 RX ORDER — CEFAZOLIN SODIUM IN 0.9 % NACL 3 G/100 ML
2000 INTRAVENOUS SOLUTION, PIGGYBACK (ML) INTRAVENOUS EVERY 8 HOURS
Refills: 0 | Status: DISCONTINUED | OUTPATIENT
Start: 2025-07-25 | End: 2025-07-26

## 2025-07-25 RX ORDER — ACETAMINOPHEN 500 MG/5ML
1000 LIQUID (ML) ORAL ONCE
Refills: 0 | Status: COMPLETED | OUTPATIENT
Start: 2025-07-25 | End: 2025-07-25

## 2025-07-25 RX ADMIN — Medication 400 MILLIGRAM(S): at 11:50

## 2025-07-25 RX ADMIN — Medication 650 MILLIGRAM(S): at 00:36

## 2025-07-25 RX ADMIN — Medication 650 MILLIGRAM(S): at 19:34

## 2025-07-25 RX ADMIN — Medication 1000 MILLIGRAM(S): at 12:20

## 2025-07-25 RX ADMIN — MEROPENEM 100 MILLIGRAM(S): 1 INJECTION INTRAVENOUS at 06:14

## 2025-07-25 RX ADMIN — MEROPENEM 100 MILLIGRAM(S): 1 INJECTION INTRAVENOUS at 13:45

## 2025-07-25 RX ADMIN — Medication 100 MILLIGRAM(S): at 21:20

## 2025-07-26 ENCOUNTER — TRANSCRIPTION ENCOUNTER (OUTPATIENT)
Age: 60
End: 2025-07-26

## 2025-07-26 VITALS
RESPIRATION RATE: 18 BRPM | DIASTOLIC BLOOD PRESSURE: 91 MMHG | OXYGEN SATURATION: 98 % | TEMPERATURE: 98 F | HEART RATE: 88 BPM | SYSTOLIC BLOOD PRESSURE: 126 MMHG

## 2025-07-26 DIAGNOSIS — A41.50 GRAM-NEGATIVE SEPSIS, UNSPECIFIED: ICD-10-CM

## 2025-07-26 LAB
-  AMPICILLIN/SULBACTAM: SIGNIFICANT CHANGE UP
-  AMPICILLIN: SIGNIFICANT CHANGE UP
-  AZTREONAM: SIGNIFICANT CHANGE UP
-  CEFAZOLIN: SIGNIFICANT CHANGE UP
-  CEFEPIME: SIGNIFICANT CHANGE UP
-  CEFOXITIN: SIGNIFICANT CHANGE UP
-  CEFTRIAXONE: SIGNIFICANT CHANGE UP
-  CIPROFLOXACIN: SIGNIFICANT CHANGE UP
-  ERTAPENEM: SIGNIFICANT CHANGE UP
-  GENTAMICIN: SIGNIFICANT CHANGE UP
-  LEVOFLOXACIN: SIGNIFICANT CHANGE UP
-  MEROPENEM: SIGNIFICANT CHANGE UP
-  PIPERACILLIN/TAZOBACTAM: SIGNIFICANT CHANGE UP
-  TOBRAMYCIN: SIGNIFICANT CHANGE UP
-  TRIMETHOPRIM/SULFAMETHOXAZOLE: SIGNIFICANT CHANGE UP
ANION GAP SERPL CALC-SCNC: 11 MMOL/L — SIGNIFICANT CHANGE UP (ref 5–17)
BUN SERPL-MCNC: 11 MG/DL — SIGNIFICANT CHANGE UP (ref 7–23)
CALCIUM SERPL-MCNC: 8.3 MG/DL — LOW (ref 8.4–10.5)
CHLORIDE SERPL-SCNC: 105 MMOL/L — SIGNIFICANT CHANGE UP (ref 96–108)
CO2 SERPL-SCNC: 19 MMOL/L — LOW (ref 22–31)
CREAT SERPL-MCNC: 0.85 MG/DL — SIGNIFICANT CHANGE UP (ref 0.5–1.3)
CULTURE RESULTS: ABNORMAL
EGFR: 101 ML/MIN/1.73M2 — SIGNIFICANT CHANGE UP
EGFR: 101 ML/MIN/1.73M2 — SIGNIFICANT CHANGE UP
GLUCOSE SERPL-MCNC: 109 MG/DL — HIGH (ref 70–99)
HCT VFR BLD CALC: 37.2 % — LOW (ref 39–50)
HGB BLD-MCNC: 12.5 G/DL — LOW (ref 13–17)
MCHC RBC-ENTMCNC: 29.5 PG — SIGNIFICANT CHANGE UP (ref 27–34)
MCHC RBC-ENTMCNC: 33.6 G/DL — SIGNIFICANT CHANGE UP (ref 32–36)
MCV RBC AUTO: 87.7 FL — SIGNIFICANT CHANGE UP (ref 80–100)
METHOD TYPE: SIGNIFICANT CHANGE UP
NRBC # BLD AUTO: 0 K/UL — SIGNIFICANT CHANGE UP (ref 0–0)
NRBC # FLD: 0 K/UL — SIGNIFICANT CHANGE UP (ref 0–0)
NRBC BLD AUTO-RTO: 0 /100 WBCS — SIGNIFICANT CHANGE UP (ref 0–0)
ORGANISM # SPEC MICROSCOPIC CNT: ABNORMAL
PLATELET # BLD AUTO: 147 K/UL — LOW (ref 150–400)
PMV BLD: 9 FL — SIGNIFICANT CHANGE UP (ref 7–13)
POTASSIUM SERPL-MCNC: 3.6 MMOL/L — SIGNIFICANT CHANGE UP (ref 3.5–5.3)
POTASSIUM SERPL-SCNC: 3.6 MMOL/L — SIGNIFICANT CHANGE UP (ref 3.5–5.3)
RBC # BLD: 4.24 M/UL — SIGNIFICANT CHANGE UP (ref 4.2–5.8)
RBC # FLD: 12.8 % — SIGNIFICANT CHANGE UP (ref 10.3–14.5)
SODIUM SERPL-SCNC: 135 MMOL/L — SIGNIFICANT CHANGE UP (ref 135–145)
SPECIMEN SOURCE: SIGNIFICANT CHANGE UP
WBC # BLD: 5 K/UL — SIGNIFICANT CHANGE UP (ref 3.8–10.5)
WBC # FLD AUTO: 5 K/UL — SIGNIFICANT CHANGE UP (ref 3.8–10.5)

## 2025-07-26 PROCEDURE — 82330 ASSAY OF CALCIUM: CPT

## 2025-07-26 PROCEDURE — 80053 COMPREHEN METABOLIC PANEL: CPT

## 2025-07-26 PROCEDURE — 83605 ASSAY OF LACTIC ACID: CPT

## 2025-07-26 PROCEDURE — 82435 ASSAY OF BLOOD CHLORIDE: CPT

## 2025-07-26 PROCEDURE — 96375 TX/PRO/DX INJ NEW DRUG ADDON: CPT

## 2025-07-26 PROCEDURE — 80048 BASIC METABOLIC PNL TOTAL CA: CPT

## 2025-07-26 PROCEDURE — 84295 ASSAY OF SERUM SODIUM: CPT

## 2025-07-26 PROCEDURE — 87086 URINE CULTURE/COLONY COUNT: CPT

## 2025-07-26 PROCEDURE — 84132 ASSAY OF SERUM POTASSIUM: CPT

## 2025-07-26 PROCEDURE — 82947 ASSAY GLUCOSE BLOOD QUANT: CPT

## 2025-07-26 PROCEDURE — 36415 COLL VENOUS BLD VENIPUNCTURE: CPT

## 2025-07-26 PROCEDURE — 99239 HOSP IP/OBS DSCHRG MGMT >30: CPT

## 2025-07-26 PROCEDURE — 82803 BLOOD GASES ANY COMBINATION: CPT

## 2025-07-26 PROCEDURE — 99285 EMERGENCY DEPT VISIT HI MDM: CPT

## 2025-07-26 PROCEDURE — 87040 BLOOD CULTURE FOR BACTERIA: CPT

## 2025-07-26 PROCEDURE — 85014 HEMATOCRIT: CPT

## 2025-07-26 PROCEDURE — 81001 URINALYSIS AUTO W/SCOPE: CPT

## 2025-07-26 PROCEDURE — 85025 COMPLETE CBC W/AUTO DIFF WBC: CPT

## 2025-07-26 PROCEDURE — 85018 HEMOGLOBIN: CPT

## 2025-07-26 PROCEDURE — 85027 COMPLETE CBC AUTOMATED: CPT

## 2025-07-26 PROCEDURE — 93005 ELECTROCARDIOGRAM TRACING: CPT

## 2025-07-26 PROCEDURE — 96374 THER/PROPH/DIAG INJ IV PUSH: CPT

## 2025-07-26 RX ORDER — CEFPODOXIME PROXETIL 200 MG/1
1 TABLET, FILM COATED ORAL
Qty: 0 | Refills: 0 | DISCHARGE

## 2025-07-26 RX ORDER — CEFPODOXIME PROXETIL 200 MG/1
1 TABLET, FILM COATED ORAL
Qty: 4 | Refills: 0
Start: 2025-07-26 | End: 2025-07-27

## 2025-07-26 RX ORDER — CEFPODOXIME PROXETIL 200 MG/1
1 TABLET, FILM COATED ORAL
Qty: 20 | Refills: 0
Start: 2025-07-26 | End: 2025-08-04

## 2025-07-26 RX ADMIN — Medication 100 MILLIGRAM(S): at 14:11

## 2025-07-26 RX ADMIN — Medication 100 MILLIGRAM(S): at 05:26

## 2025-07-28 LAB
CULTURE RESULTS: SIGNIFICANT CHANGE UP
SPECIMEN SOURCE: SIGNIFICANT CHANGE UP

## 2025-07-30 LAB
CULTURE RESULTS: SIGNIFICANT CHANGE UP
CULTURE RESULTS: SIGNIFICANT CHANGE UP
SPECIMEN SOURCE: SIGNIFICANT CHANGE UP
SPECIMEN SOURCE: SIGNIFICANT CHANGE UP

## 2025-07-31 ENCOUNTER — APPOINTMENT (OUTPATIENT)
Dept: UROLOGY | Facility: CLINIC | Age: 60
End: 2025-07-31

## 2025-07-31 DIAGNOSIS — R31.0 GROSS HEMATURIA: ICD-10-CM

## 2025-08-07 ENCOUNTER — APPOINTMENT (OUTPATIENT)
Dept: UROLOGY | Facility: CLINIC | Age: 60
End: 2025-08-07

## 2025-08-07 DIAGNOSIS — N32.89 OTHER SPECIFIED DISORDERS OF BLADDER: ICD-10-CM

## 2025-08-14 ENCOUNTER — NON-APPOINTMENT (OUTPATIENT)
Age: 60
End: 2025-08-14

## 2025-09-04 ENCOUNTER — APPOINTMENT (OUTPATIENT)
Dept: UROLOGY | Facility: CLINIC | Age: 60
End: 2025-09-04
Payer: COMMERCIAL

## 2025-09-04 ENCOUNTER — OUTPATIENT (OUTPATIENT)
Dept: OUTPATIENT SERVICES | Facility: HOSPITAL | Age: 60
LOS: 1 days | End: 2025-09-04
Payer: COMMERCIAL

## 2025-09-04 VITALS — SYSTOLIC BLOOD PRESSURE: 129 MMHG | HEART RATE: 85 BPM | DIASTOLIC BLOOD PRESSURE: 90 MMHG

## 2025-09-04 DIAGNOSIS — Z98.890 OTHER SPECIFIED POSTPROCEDURAL STATES: Chronic | ICD-10-CM

## 2025-09-04 DIAGNOSIS — R31.0 GROSS HEMATURIA: ICD-10-CM

## 2025-09-04 DIAGNOSIS — N40.1 BENIGN PROSTATIC HYPERPLASIA WITH LOWER URINARY TRACT SYMPMS: ICD-10-CM

## 2025-09-04 DIAGNOSIS — N13.8 BENIGN PROSTATIC HYPERPLASIA WITH LOWER URINARY TRACT SYMPMS: ICD-10-CM

## 2025-09-04 DIAGNOSIS — R35.0 FREQUENCY OF MICTURITION: ICD-10-CM

## 2025-09-04 DIAGNOSIS — Z87.898 PERSONAL HISTORY OF OTHER SPECIFIED CONDITIONS: ICD-10-CM

## 2025-09-04 PROCEDURE — 99214 OFFICE O/P EST MOD 30 MIN: CPT | Mod: 25

## 2025-09-04 PROCEDURE — 52000 CYSTOURETHROSCOPY: CPT

## 2025-09-06 PROBLEM — Z87.898 HISTORY OF URINARY RETENTION: Status: RESOLVED | Noted: 2025-05-05 | Resolved: 2025-09-06

## 2025-09-08 DIAGNOSIS — R31.0 GROSS HEMATURIA: ICD-10-CM

## 2025-09-08 DIAGNOSIS — N40.1 BENIGN PROSTATIC HYPERPLASIA WITH LOWER URINARY TRACT SYMPTOMS: ICD-10-CM

## 2025-09-08 DIAGNOSIS — Z87.898 PERSONAL HISTORY OF OTHER SPECIFIED CONDITIONS: ICD-10-CM
